# Patient Record
Sex: MALE | Race: WHITE | NOT HISPANIC OR LATINO | Employment: OTHER | URBAN - METROPOLITAN AREA
[De-identification: names, ages, dates, MRNs, and addresses within clinical notes are randomized per-mention and may not be internally consistent; named-entity substitution may affect disease eponyms.]

---

## 2019-12-27 PROBLEM — K76.0 FATTY LIVER: Status: ACTIVE | Noted: 2019-12-27

## 2019-12-27 PROBLEM — K44.9 ESOPHAGEAL HIATUS HERNIA: Status: ACTIVE | Noted: 2019-12-27

## 2019-12-27 PROBLEM — I70.0 AORTIC ATHEROSCLEROSIS: Status: ACTIVE | Noted: 2019-12-27

## 2020-07-27 PROBLEM — N40.1 BENIGN LOCALIZED PROSTATIC HYPERPLASIA WITH LOWER URINARY TRACT SYMPTOMS (LUTS): Status: ACTIVE | Noted: 2020-07-27

## 2021-02-08 ENCOUNTER — CLINICAL SUPPORT (OUTPATIENT)
Dept: URGENT CARE | Facility: CLINIC | Age: 76
End: 2021-02-08
Payer: MEDICARE

## 2021-02-08 DIAGNOSIS — Z03.818 ENCOUNTER FOR OBSERVATION FOR SUSPECTED EXPOSURE TO OTHER BIOLOGICAL AGENTS RULED OUT: Primary | ICD-10-CM

## 2021-02-08 LAB
CTP QC/QA: YES
SARS-COV-2 RDRP RESP QL NAA+PROBE: NEGATIVE

## 2021-02-08 PROCEDURE — 99211 OFF/OP EST MAY X REQ PHY/QHP: CPT | Mod: S$GLB,CS,, | Performed by: FAMILY MEDICINE

## 2021-02-08 PROCEDURE — U0002: ICD-10-PCS | Mod: QW,S$GLB,, | Performed by: FAMILY MEDICINE

## 2021-02-08 PROCEDURE — U0002 COVID-19 LAB TEST NON-CDC: HCPCS | Mod: QW,S$GLB,, | Performed by: FAMILY MEDICINE

## 2021-02-08 PROCEDURE — 99211 PR OFFICE/OUTPT VISIT, EST, LEVL I: ICD-10-PCS | Mod: S$GLB,CS,, | Performed by: FAMILY MEDICINE

## 2021-11-02 ENCOUNTER — IMMUNIZATION (OUTPATIENT)
Dept: PHARMACY | Facility: CLINIC | Age: 76
End: 2021-11-02
Payer: MEDICARE

## 2021-11-02 DIAGNOSIS — Z23 NEED FOR VACCINATION: Primary | ICD-10-CM

## 2021-11-29 PROBLEM — Z82.62 FAMILY HISTORY OF OSTEOPOROSIS: Status: ACTIVE | Noted: 2021-11-29

## 2022-01-24 ENCOUNTER — TELEPHONE (OUTPATIENT)
Dept: DERMATOLOGY | Facility: CLINIC | Age: 77
End: 2022-01-24
Payer: MEDICARE

## 2022-04-11 ENCOUNTER — OFFICE VISIT (OUTPATIENT)
Dept: DERMATOLOGY | Facility: CLINIC | Age: 77
End: 2022-04-11
Payer: MEDICARE

## 2022-04-11 VITALS — WEIGHT: 180.13 LBS | BODY MASS INDEX: 24.4 KG/M2 | HEIGHT: 72 IN | RESPIRATION RATE: 18 BRPM

## 2022-04-11 DIAGNOSIS — D48.5 NEOPLASM OF UNCERTAIN BEHAVIOR OF SKIN: ICD-10-CM

## 2022-04-11 DIAGNOSIS — L82.1 SEBORRHEIC KERATOSES: Primary | ICD-10-CM

## 2022-04-11 PROCEDURE — 3288F PR FALLS RISK ASSESSMENT DOCUMENTED: ICD-10-PCS | Mod: CPTII,S$GLB,, | Performed by: STUDENT IN AN ORGANIZED HEALTH CARE EDUCATION/TRAINING PROGRAM

## 2022-04-11 PROCEDURE — 1159F PR MEDICATION LIST DOCUMENTED IN MEDICAL RECORD: ICD-10-PCS | Mod: CPTII,S$GLB,, | Performed by: STUDENT IN AN ORGANIZED HEALTH CARE EDUCATION/TRAINING PROGRAM

## 2022-04-11 PROCEDURE — 99203 OFFICE O/P NEW LOW 30 MIN: CPT | Mod: 25,S$GLB,, | Performed by: STUDENT IN AN ORGANIZED HEALTH CARE EDUCATION/TRAINING PROGRAM

## 2022-04-11 PROCEDURE — 1126F AMNT PAIN NOTED NONE PRSNT: CPT | Mod: CPTII,S$GLB,, | Performed by: STUDENT IN AN ORGANIZED HEALTH CARE EDUCATION/TRAINING PROGRAM

## 2022-04-11 PROCEDURE — 1101F PR PT FALLS ASSESS DOC 0-1 FALLS W/OUT INJ PAST YR: ICD-10-PCS | Mod: CPTII,S$GLB,, | Performed by: STUDENT IN AN ORGANIZED HEALTH CARE EDUCATION/TRAINING PROGRAM

## 2022-04-11 PROCEDURE — 1160F PR REVIEW ALL MEDS BY PRESCRIBER/CLIN PHARMACIST DOCUMENTED: ICD-10-PCS | Mod: CPTII,S$GLB,, | Performed by: STUDENT IN AN ORGANIZED HEALTH CARE EDUCATION/TRAINING PROGRAM

## 2022-04-11 PROCEDURE — 1101F PT FALLS ASSESS-DOCD LE1/YR: CPT | Mod: CPTII,S$GLB,, | Performed by: STUDENT IN AN ORGANIZED HEALTH CARE EDUCATION/TRAINING PROGRAM

## 2022-04-11 PROCEDURE — 11102 TANGNTL BX SKIN SINGLE LES: CPT | Mod: S$GLB,,, | Performed by: STUDENT IN AN ORGANIZED HEALTH CARE EDUCATION/TRAINING PROGRAM

## 2022-04-11 PROCEDURE — 1159F MED LIST DOCD IN RCRD: CPT | Mod: CPTII,S$GLB,, | Performed by: STUDENT IN AN ORGANIZED HEALTH CARE EDUCATION/TRAINING PROGRAM

## 2022-04-11 PROCEDURE — 88305 TISSUE EXAM BY PATHOLOGIST: ICD-10-PCS | Mod: 26,,, | Performed by: PATHOLOGY

## 2022-04-11 PROCEDURE — 99203 PR OFFICE/OUTPT VISIT, NEW, LEVL III, 30-44 MIN: ICD-10-PCS | Mod: 25,S$GLB,, | Performed by: STUDENT IN AN ORGANIZED HEALTH CARE EDUCATION/TRAINING PROGRAM

## 2022-04-11 PROCEDURE — 88305 TISSUE EXAM BY PATHOLOGIST: CPT | Performed by: PATHOLOGY

## 2022-04-11 PROCEDURE — 1126F PR PAIN SEVERITY QUANTIFIED, NO PAIN PRESENT: ICD-10-PCS | Mod: CPTII,S$GLB,, | Performed by: STUDENT IN AN ORGANIZED HEALTH CARE EDUCATION/TRAINING PROGRAM

## 2022-04-11 PROCEDURE — 99999 PR PBB SHADOW E&M-EST. PATIENT-LVL III: CPT | Mod: PBBFAC,,, | Performed by: STUDENT IN AN ORGANIZED HEALTH CARE EDUCATION/TRAINING PROGRAM

## 2022-04-11 PROCEDURE — 11102 PR TANGENTIAL BIOPSY, SKIN, SINGLE LESION: ICD-10-PCS | Mod: S$GLB,,, | Performed by: STUDENT IN AN ORGANIZED HEALTH CARE EDUCATION/TRAINING PROGRAM

## 2022-04-11 PROCEDURE — 1160F RVW MEDS BY RX/DR IN RCRD: CPT | Mod: CPTII,S$GLB,, | Performed by: STUDENT IN AN ORGANIZED HEALTH CARE EDUCATION/TRAINING PROGRAM

## 2022-04-11 PROCEDURE — 3288F FALL RISK ASSESSMENT DOCD: CPT | Mod: CPTII,S$GLB,, | Performed by: STUDENT IN AN ORGANIZED HEALTH CARE EDUCATION/TRAINING PROGRAM

## 2022-04-11 PROCEDURE — 88305 TISSUE EXAM BY PATHOLOGIST: CPT | Mod: 26,,, | Performed by: PATHOLOGY

## 2022-04-11 PROCEDURE — 99999 PR PBB SHADOW E&M-EST. PATIENT-LVL III: ICD-10-PCS | Mod: PBBFAC,,, | Performed by: STUDENT IN AN ORGANIZED HEALTH CARE EDUCATION/TRAINING PROGRAM

## 2022-04-11 NOTE — PROGRESS NOTES
Subjective:       Patient ID:  Chacorta Gómez is a 76 y.o. male who presents for   Chief Complaint   Patient presents with    Lesion     Patient present for lesions.     Pt c/o lesion on left forehead X years. Previously treated with cryo. Pt states he wants it removed.    Pt c/o lesion on scalp X 1 year. Pt states he catches that spot when he landa his hair.    no Phx of NMSC.  no Fhx of melanoma.    Past Medical History:  No date: Benign localized hyperplasia of prostate with urinary   obstruction and other lower urinary tract symptoms (LUTS)(600.21)  7/12/2016: Essential hypertension  No date: Glaucoma      Comment:  bilat  No date: Spontaneous ecchymoses  No date: Unspecified essential hypertension        Review of Systems   HENT: Negative for congestion.    Respiratory: Negative for cough.    Skin: Positive for itching, activity-related sunscreen use and wears hat.   Hematologic/Lymphatic: Bruises/bleeds easily.        Objective:    Physical Exam   Constitutional: He appears well-developed and well-nourished. No distress.   Neurological: He is alert and oriented to person, place, and time. He is not disoriented.   Psychiatric: He has a normal mood and affect.   Skin:   Areas Examined (abnormalities noted in diagram):   Scalp / Hair Palpated and Inspected  Head / Face Inspection Performed                   Diagram Legend     Erythematous scaling macule/papule c/w actinic keratosis       Vascular papule c/w angioma      Pigmented verrucoid papule/plaque c/w seborrheic keratosis      Yellow umbilicated papule c/w sebaceous hyperplasia      Irregularly shaped tan macule c/w lentigo     1-2 mm smooth white papules consistent with Milia      Movable subcutaneous cyst with punctum c/w epidermal inclusion cyst      Subcutaneous movable cyst c/w pilar cyst      Firm pink to brown papule c/w dermatofibroma      Pedunculated fleshy papule(s) c/w skin tag(s)      Evenly pigmented macule c/w junctional nevus     Mildly  variegated pigmented, slightly irregular-bordered macule c/w mildly atypical nevus      Flesh colored to evenly pigmented papule c/w intradermal nevus       Pink pearly papule/plaque c/w basal cell carcinoma      Erythematous hyperkeratotic cursted plaque c/w SCC      Surgical scar with no sign of skin cancer recurrence      Open and closed comedones      Inflammatory papules and pustules      Verrucoid papule consistent consistent with wart     Erythematous eczematous patches and plaques     Dystrophic onycholytic nail with subungual debris c/w onychomycosis     Umbilicated papule    Erythematous-base heme-crusted tan verrucoid plaque consistent with inflamed seborrheic keratosis     Erythematous Silvery Scaling Plaque c/w Psoriasis     See annotation          Assessment / Plan:      Pathology Orders:     Normal Orders This Visit    Specimen to Pathology, Dermatology     Questions:    Procedure Type: Dermatology and skin neoplasms    Number of Specimens: 1    ------------------------: -------------------------    Spec 1 Procedure: Biopsy    Spec 1 Clinical Impression: r/o SCC vs ISK vs VV    Spec 1 Source: frontal scalp    Release to patient: Immediate        Seborrheic keratoses  These are benign inherited growths without a malignant potential. Reassurance given to patient. No treatment is necessary.     Neoplasm of uncertain behavior of skin  Shave biopsy procedure note:    Shave biopsy performed after verbal consent including risk of infection, scar, recurrence, need for additional treatment of site. Area prepped with alcohol, anesthetized with approximately 1.0cc of 1% lidocaine with epinephrine. Lesional tissue shaved with razor blade. Hemostasis achieved with application of aluminum chloride followed by hyfrecation. No complications. Dressing applied. Wound care explained.      -     Specimen to Pathology, Dermatology             No follow-ups on file.

## 2022-04-11 NOTE — PATIENT INSTRUCTIONS
CRYOSURGERY      Your doctor has used a method called cryosurgery to treat your skin condition. Cryosurgery refers to the use of very cold substances to treat a variety of skin conditions such as warts, pre-skin cancers, molluscum contagiosum, sun spots, and several benign growths. The substance we use in cryosurgery is liquid nitrogen and is so cold (-195 degrees Celsius) that is burns when administered.     Following treatment in the office, the skin may immediately burn and become red. You may find the area around the lesion is affected as well. It is sometimes necessary to treat not only the lesion, but a small area of the surrounding normal skin to achieve a good response.     A blister, and even a blood filled blister, may form after treatment.   This is a normal response. If the blister is painful, it is acceptable to sterilize a needle and with rubbing alcohol and gently pop the blister. It is important that you gently wash the area with soap and warm water as the blister fluid may contain wart virus if a wart was treated. Do no remove the roof of the blister.     The area treated can take anywhere from 1-3 weeks to heal. Healing time depends on the kind skin lesion treated, the location, and how aggressively the lesion was treated. It is recommended that the areas treated are covered with Vaseline or bacitracin ointment and a band-aid. If a band-aid is not practical, just ointment applied several times per day will do. Keeping these areas moist will speed the healing time.    Treatment with liquid nitrogen can leave a scar. In dark skin, it may be a light or dark scar, in light skin it may be a white or pink scar. These will generally fade with time, but may never go away completely.     If you have any concerns after your treatment, please feel free to call the office.       [address] [facility] [place] [address]/ [phone] FAX/ [email or web address]  Shave Biopsy Wound Care    Your  doctor has performed a shave biopsy today.  A band aid and vaseline ointment has been placed over the site.  This should remain in place for NO LONGER THAN 48 hours.  It is fine to remove the bandaid after 24 hours, if the area is no longer bleeding. It is recommended that you keep the area dry (do not wet)) for the first 24 hours.  After 24 hours, wash the area with warm soap and water and apply Vaseline jelly.  Many patients prefer to use Neosporin or Bacitracin ointment.  This is acceptable; however, know that you can develop an allergy to this medication even if you have used it safely for years.  It is important to keep the area moist.  Letting it dry out and get air slows healing time, and will worsen the scar.        If you notice increasing redness, tenderness, pain, or yellow drainage at the biopsy site, please notify your doctor.  These are signs of an infection.    If your biopsy site is bleeding, apply firm pressure for 15 minutes straight.  Repeat for another 15 minutes, if it is still bleeding.   If the surgical site continues to bleed, then please contact your doctor.      For [facility] users:   You will receive your biopsy results in [facility] as soon as they are available. Please be assured that your physician/provider will review your results and will then determine what further treatment, evaluation, or planning is required. You should be contacted by your physician's/provider's office within 5 business days of receiving your results; If not, please reach out to directly. This is one more way [facility] is putting you first.     [facility] [place] [address]/ [phone] FAX/ [email or web address]

## 2022-04-18 LAB
FINAL PATHOLOGIC DIAGNOSIS: NORMAL
GROSS: NORMAL
Lab: NORMAL
MICROSCOPIC EXAM: NORMAL

## 2022-12-09 PROBLEM — H40.9 GLAUCOMA: Status: ACTIVE | Noted: 2022-12-09

## 2022-12-09 PROBLEM — M47.816 OSTEOARTHRITIS OF LUMBAR SPINE: Status: ACTIVE | Noted: 2022-12-09

## 2023-02-15 ENCOUNTER — OFFICE VISIT (OUTPATIENT)
Dept: PAIN MEDICINE | Facility: CLINIC | Age: 78
End: 2023-02-15
Payer: MEDICARE

## 2023-02-15 VITALS
HEIGHT: 72 IN | WEIGHT: 181.69 LBS | SYSTOLIC BLOOD PRESSURE: 164 MMHG | HEART RATE: 50 BPM | DIASTOLIC BLOOD PRESSURE: 85 MMHG | BODY MASS INDEX: 24.61 KG/M2

## 2023-02-15 DIAGNOSIS — M54.50 CHRONIC MIDLINE LOW BACK PAIN WITHOUT SCIATICA: ICD-10-CM

## 2023-02-15 DIAGNOSIS — M54.6 ACUTE BILATERAL THORACIC BACK PAIN: ICD-10-CM

## 2023-02-15 DIAGNOSIS — G89.29 CHRONIC MIDLINE LOW BACK PAIN WITHOUT SCIATICA: ICD-10-CM

## 2023-02-15 PROCEDURE — 1159F MED LIST DOCD IN RCRD: CPT | Mod: CPTII,S$GLB,, | Performed by: PHYSICIAN ASSISTANT

## 2023-02-15 PROCEDURE — 1101F PT FALLS ASSESS-DOCD LE1/YR: CPT | Mod: CPTII,S$GLB,, | Performed by: PHYSICIAN ASSISTANT

## 2023-02-15 PROCEDURE — 1160F RVW MEDS BY RX/DR IN RCRD: CPT | Mod: CPTII,S$GLB,, | Performed by: PHYSICIAN ASSISTANT

## 2023-02-15 PROCEDURE — 3079F DIAST BP 80-89 MM HG: CPT | Mod: CPTII,S$GLB,, | Performed by: PHYSICIAN ASSISTANT

## 2023-02-15 PROCEDURE — 3077F SYST BP >= 140 MM HG: CPT | Mod: CPTII,S$GLB,, | Performed by: PHYSICIAN ASSISTANT

## 2023-02-15 PROCEDURE — 1101F PR PT FALLS ASSESS DOC 0-1 FALLS W/OUT INJ PAST YR: ICD-10-PCS | Mod: CPTII,S$GLB,, | Performed by: PHYSICIAN ASSISTANT

## 2023-02-15 PROCEDURE — 3288F FALL RISK ASSESSMENT DOCD: CPT | Mod: CPTII,S$GLB,, | Performed by: PHYSICIAN ASSISTANT

## 2023-02-15 PROCEDURE — 1159F PR MEDICATION LIST DOCUMENTED IN MEDICAL RECORD: ICD-10-PCS | Mod: CPTII,S$GLB,, | Performed by: PHYSICIAN ASSISTANT

## 2023-02-15 PROCEDURE — 99203 OFFICE O/P NEW LOW 30 MIN: CPT | Mod: S$GLB,,, | Performed by: PHYSICIAN ASSISTANT

## 2023-02-15 PROCEDURE — 99999 PR PBB SHADOW E&M-EST. PATIENT-LVL IV: CPT | Mod: PBBFAC,,, | Performed by: PHYSICIAN ASSISTANT

## 2023-02-15 PROCEDURE — 3079F PR MOST RECENT DIASTOLIC BLOOD PRESSURE 80-89 MM HG: ICD-10-PCS | Mod: CPTII,S$GLB,, | Performed by: PHYSICIAN ASSISTANT

## 2023-02-15 PROCEDURE — 3077F PR MOST RECENT SYSTOLIC BLOOD PRESSURE >= 140 MM HG: ICD-10-PCS | Mod: CPTII,S$GLB,, | Performed by: PHYSICIAN ASSISTANT

## 2023-02-15 PROCEDURE — 1126F PR PAIN SEVERITY QUANTIFIED, NO PAIN PRESENT: ICD-10-PCS | Mod: CPTII,S$GLB,, | Performed by: PHYSICIAN ASSISTANT

## 2023-02-15 PROCEDURE — 99999 PR PBB SHADOW E&M-EST. PATIENT-LVL IV: ICD-10-PCS | Mod: PBBFAC,,, | Performed by: PHYSICIAN ASSISTANT

## 2023-02-15 PROCEDURE — 1160F PR REVIEW ALL MEDS BY PRESCRIBER/CLIN PHARMACIST DOCUMENTED: ICD-10-PCS | Mod: CPTII,S$GLB,, | Performed by: PHYSICIAN ASSISTANT

## 2023-02-15 PROCEDURE — 3288F PR FALLS RISK ASSESSMENT DOCUMENTED: ICD-10-PCS | Mod: CPTII,S$GLB,, | Performed by: PHYSICIAN ASSISTANT

## 2023-02-15 PROCEDURE — 1126F AMNT PAIN NOTED NONE PRSNT: CPT | Mod: CPTII,S$GLB,, | Performed by: PHYSICIAN ASSISTANT

## 2023-02-15 PROCEDURE — 99203 PR OFFICE/OUTPT VISIT, NEW, LEVL III, 30-44 MIN: ICD-10-PCS | Mod: S$GLB,,, | Performed by: PHYSICIAN ASSISTANT

## 2023-02-15 NOTE — PROGRESS NOTES
[facility] New Patient Evaluation      Referred by: Genna Mcfadden    PCP:  Andrei Dupont MD    CC:   Chief Complaint   Patient presents with    Back Pain     Low back pain      No flowsheet data found.      HPI:   Chacorta Gómez is a 77 y.o. male with history of hypertension, glaucoma presents with back pain.  He is an avid walker, walking 2-3 miles daily. He has 20 years of managable lower lumbar back pain, but it is worsening.  More recently, he has developed mucsle tightness in the mid thoracic region over the last 1 year with walking.  It is preventing him from his usual walks and he has changed to bike riding, which does not seem to bother the mid back.  Denies any radicular lower extremity or chest wall radicular pain, numbness, tingling.  He takes aleve prn for pain.  No other treatments.       Past and current medications:  Antineuropathics:  NSAIDs:  aleve prn  Antidepressants:  Muscle relaxers:  Opioids:  Antiplatelets/Anticoagulants:    Physical therapy/ Chiropractic care:  none    Pain Intervention History:  none    Past Spine Surgical History:  none      History:    Current Outpatient Medications:     dorzolamide-timolol 2-0.5% (COSOPT) 22.3-6.8 mg/mL ophthalmic solution, Place 1 drop into both eyes 2 (two) times daily., Disp: , Rfl:     famotidine (PEPCID) 10 MG tablet, Take 10 mg by mouth nightly as needed for Heartburn. , Disp: , Rfl:     finasteride (PROSCAR) 5 mg tablet, TAKE 1 TABLET EVERY DAY, Disp: 90 tablet, Rfl: 3    hydroCHLOROthiazide (HYDRODIURIL) 12.5 MG Tab, TAKE 1 TABLET  BY MOUTH ONCE DAILY., Disp: 90 tablet, Rfl: 3    latanoprost 0.005 % ophthalmic solution, Place 1 drop into both eyes once daily., Disp: , Rfl:     lisinopriL (PRINIVIL,ZESTRIL) 30 MG tablet, TAKE 1 TABLET EVERY DAY, Disp: 90 tablet, Rfl: 3    loratadine (CLARITIN) 10 mg tablet, Take 1 tablet by mouth once daily., Disp: , Rfl:     saw palmetto 500 MG capsule, Take 500 mg by mouth once daily. Pt taking  450 mg 1 tablet daily, Disp: , Rfl:     Past Medical History:   Diagnosis Date    Benign localized hyperplasia of prostate with urinary obstruction and other lower urinary tract symptoms (LUTS)(600.21)     Essential hypertension 7/12/2016    Glaucoma     bilat    Spontaneous ecchymoses     Unspecified essential hypertension        Past Surgical History:   Procedure Laterality Date    COLONOSCOPY  08/03/2009    Dr. Askew    NASAL SEPTUM SURGERY      TONSILLECTOMY         Family History   Problem Relation Age of Onset    Heart attack Mother     Hypertension Mother     Cancer Father         prostate     Hypertension Brother     Stroke Brother         mild        Social History     Socioeconomic History    Marital status:      Spouse name: Uyen    Number of children: 0   Tobacco Use    Smoking status: Never    Smokeless tobacco: Never   Substance and Sexual Activity    Alcohol use: Yes     Alcohol/week: 14.0 standard drinks     Types: 7 Glasses of wine, 7 Cans of beer per week    Drug use: Never    Sexual activity: Yes     Partners: Male       Review of patient's allergies indicates:  No Known Allergies    Review of Systems:  Mid back pain.  Low back pain.  Balance of review of systems is negative.    Physical Exam:  Vitals:    02/15/23 1001   BP: (!) 164/85   Pulse: (!) 50   Weight: 82.4 kg (181 lb 10.5 oz)   Height: 6' (1.829 m)   PainSc: 0-No pain     Body mass index is 24.64 kg/m².    Gen: NAD  Psych: mood appropriate for given condition  HEENT: eyes anicteric   CV: RRR, 2+ radial pulse  HEENT: anicteric   Respiratory: non-labored, no signs of respiratory distress  Abd: non-distended  Skin: warm, dry and intact.  Gait: Able to heel walk, toe walk. No antalgic gait.     Coordination:   Romberg: negative  Finger to nose coordination: normal  Heel to shin coordination: normal  Tandem walking coordination: normal    Cervical spine:   ROM is full in flexion, extension and lateral rotation without increased  pain.  Spurling's maneuver causes no neck pain to either side.  Myofascial exam: No Tenderness to palpation across cervical paraspinous region bilaterally.    Lumbar spine:   ROM is full with flexion extension and oblique extension with no increased pain.    Jaime's test causes no increased pain on either side.    Supine straight leg raise is negative bilaterally.    Internal and external rotation of the hip causes no increased pain on either side.  Myofascial exam: No tenderness to palpation across lumbar paraspinous muscles. No tenderness to palpation over the bilateral greater trochanters and bilateral SI joint    Sensory:  Intact and symmetrical to light touch in C4-T1 dermatomes bilaterally. Intact and symmetrical to light touch in L1-S1 dermatomes bilaterally.    Motor:    Right Left   C4 Shoulder Abduction  5  5   C5 Elbow Flexion    5  5   C6 Wrist Extension  5  5   C7 Elbow Extension   5  5   C8/T1 Hand Intrinsics   5  5        Right Left   L2/3 Iliacus Hip flexion  5  5   L3/4 Qudratus Femoris Knee Extension  5  5   L4/5 Tib Anterior Ankle Dorsiflexion   5  5   L5/S1 Extensor Hallicus Longus Great toe extension  5  5   S1/S2 Gastroc/Soleus Plantar Flexion  5  5      Right Left   Triceps DTR 2+ 2+   Biceps DTR 2+ 2+   Brachioradialis DTR 2+ 2+   Patellar DTR 2+ 2+   Achilles DTR 2+ 2+   Petty Absent  Absent   Clonus Absent Absent   Babinski Absent Absent     Imaging:    Xray lumbar spine from 11-29-21 reviewed.  Grade 1 retrolisthesis of L5 on S1 with modic endplate changes.    Labs:  No results found for: LABA1C, HGBA1C    Lab Results   Component Value Date    WBC 5.61 06/03/2022    HGB 15.6 06/03/2022    HCT 45.7 06/03/2022    MCV 89 06/03/2022     06/03/2022           Assessment:     Mr. Whatley has chronic lumbar region back pain with new intermittent thoracic back pain bilaterally.  No radiculoapthy nor neurological deficits.  Pain pattern in the mid thoracic region most consistent with  myofascial, mechanical back pain.  We discussed medications, PT ,home exercise.  At this time, he elects to proceed with PT. ([facility] in [place]).  Follow up as needed.       Problem List Items Addressed This Visit    None  Visit Diagnoses       Chronic midline low back pain without sciatica        Relevant Orders    Ambulatory referral/consult to Physical/Occupational Therapy    Acute bilateral thoracic back pain        Relevant Orders    Ambulatory referral/consult to Physical/Occupational Therapy              Follow Up: RTC as needed if no improvement with PT.    : Not applicable          Selena Vogel PA-C  [facility]      This note was completed with dictation software and grammatical errors may exist.

## 2023-02-24 ENCOUNTER — PATIENT MESSAGE (OUTPATIENT)
Dept: PAIN MEDICINE | Facility: CLINIC | Age: 78
End: 2023-02-24
Payer: MEDICARE

## 2023-03-24 ENCOUNTER — TELEPHONE (OUTPATIENT)
Dept: ENDOCRINOLOGY | Facility: CLINIC | Age: 78
End: 2023-03-24
Payer: MEDICARE

## 2023-03-24 NOTE — TELEPHONE ENCOUNTER
----- Message from Joan Grissom sent at 3/23/2023  4:31 PM CDT -----  Type:  Sooner Appointment Request    Caller is requesting a sooner appointment.  Caller declined first available appointment listed below.  Caller will not accept being placed on the waitlist and is requesting a message be sent to doctor.    Name of Caller:  pt   When is the first available appointment?  Unk   Symptoms:  referral   Best Call Back Number:  [phone]    Additional Information:  pt is requesting a appt after may 13 or late may or early June .  No Tuesday appt . Please advise thank you

## 2023-04-14 ENCOUNTER — OFFICE VISIT (OUTPATIENT)
Dept: ENDOCRINOLOGY | Facility: CLINIC | Age: 78
End: 2023-04-14
Payer: MEDICARE

## 2023-04-14 VITALS
SYSTOLIC BLOOD PRESSURE: 110 MMHG | WEIGHT: 177.38 LBS | HEIGHT: 72 IN | DIASTOLIC BLOOD PRESSURE: 70 MMHG | BODY MASS INDEX: 24.03 KG/M2 | HEART RATE: 51 BPM | OXYGEN SATURATION: 96 %

## 2023-04-14 DIAGNOSIS — I16.1 HYPERTENSIVE EMERGENCY: ICD-10-CM

## 2023-04-14 DIAGNOSIS — R29.90 NEW ONSET SEIZURE WITH ABNORMAL NEUROLOGICAL EXAM WITHOUT HEAD TRAUMA: ICD-10-CM

## 2023-04-14 DIAGNOSIS — E04.9 ENLARGED THYROID GLAND: ICD-10-CM

## 2023-04-14 DIAGNOSIS — R79.89 LOW TSH LEVEL: ICD-10-CM

## 2023-04-14 DIAGNOSIS — R56.9 NEW ONSET SEIZURE WITH ABNORMAL NEUROLOGICAL EXAM WITHOUT HEAD TRAUMA: ICD-10-CM

## 2023-04-14 DIAGNOSIS — E04.2 MULTIPLE THYROID NODULES: ICD-10-CM

## 2023-04-14 PROCEDURE — 1101F PR PT FALLS ASSESS DOC 0-1 FALLS W/OUT INJ PAST YR: ICD-10-PCS | Mod: CPTII,S$GLB,, | Performed by: INTERNAL MEDICINE

## 2023-04-14 PROCEDURE — 3078F PR MOST RECENT DIASTOLIC BLOOD PRESSURE < 80 MM HG: ICD-10-PCS | Mod: CPTII,S$GLB,, | Performed by: INTERNAL MEDICINE

## 2023-04-14 PROCEDURE — 3074F PR MOST RECENT SYSTOLIC BLOOD PRESSURE < 130 MM HG: ICD-10-PCS | Mod: CPTII,S$GLB,, | Performed by: INTERNAL MEDICINE

## 2023-04-14 PROCEDURE — 1126F AMNT PAIN NOTED NONE PRSNT: CPT | Mod: CPTII,S$GLB,, | Performed by: INTERNAL MEDICINE

## 2023-04-14 PROCEDURE — 3288F PR FALLS RISK ASSESSMENT DOCUMENTED: ICD-10-PCS | Mod: CPTII,S$GLB,, | Performed by: INTERNAL MEDICINE

## 2023-04-14 PROCEDURE — 99999 PR PBB SHADOW E&M-EST. PATIENT-LVL IV: ICD-10-PCS | Mod: PBBFAC,,, | Performed by: INTERNAL MEDICINE

## 2023-04-14 PROCEDURE — 3074F SYST BP LT 130 MM HG: CPT | Mod: CPTII,S$GLB,, | Performed by: INTERNAL MEDICINE

## 2023-04-14 PROCEDURE — 1126F PR PAIN SEVERITY QUANTIFIED, NO PAIN PRESENT: ICD-10-PCS | Mod: CPTII,S$GLB,, | Performed by: INTERNAL MEDICINE

## 2023-04-14 PROCEDURE — 99999 PR PBB SHADOW E&M-EST. PATIENT-LVL IV: CPT | Mod: PBBFAC,,, | Performed by: INTERNAL MEDICINE

## 2023-04-14 PROCEDURE — 3288F FALL RISK ASSESSMENT DOCD: CPT | Mod: CPTII,S$GLB,, | Performed by: INTERNAL MEDICINE

## 2023-04-14 PROCEDURE — 3078F DIAST BP <80 MM HG: CPT | Mod: CPTII,S$GLB,, | Performed by: INTERNAL MEDICINE

## 2023-04-14 PROCEDURE — 99203 PR OFFICE/OUTPT VISIT, NEW, LEVL III, 30-44 MIN: ICD-10-PCS | Mod: S$GLB,,, | Performed by: INTERNAL MEDICINE

## 2023-04-14 PROCEDURE — 1101F PT FALLS ASSESS-DOCD LE1/YR: CPT | Mod: CPTII,S$GLB,, | Performed by: INTERNAL MEDICINE

## 2023-04-14 PROCEDURE — 1160F PR REVIEW ALL MEDS BY PRESCRIBER/CLIN PHARMACIST DOCUMENTED: ICD-10-PCS | Mod: CPTII,S$GLB,, | Performed by: INTERNAL MEDICINE

## 2023-04-14 PROCEDURE — 1159F MED LIST DOCD IN RCRD: CPT | Mod: CPTII,S$GLB,, | Performed by: INTERNAL MEDICINE

## 2023-04-14 PROCEDURE — 1160F RVW MEDS BY RX/DR IN RCRD: CPT | Mod: CPTII,S$GLB,, | Performed by: INTERNAL MEDICINE

## 2023-04-14 PROCEDURE — 1159F PR MEDICATION LIST DOCUMENTED IN MEDICAL RECORD: ICD-10-PCS | Mod: CPTII,S$GLB,, | Performed by: INTERNAL MEDICINE

## 2023-04-14 PROCEDURE — 99203 OFFICE O/P NEW LOW 30 MIN: CPT | Mod: S$GLB,,, | Performed by: INTERNAL MEDICINE

## 2023-04-14 NOTE — PROGRESS NOTES
CHIEF COMPLAINT:  Multinodular goiter  77 y.o. old being seen as a new patient. Recently had an Ultrasound showing Multinodular Goiter.  Recently had a CVA and went to [place]. No XRT to head/neck. No difficulty swallowing. No change in voice. No palpitations. No tremors. No increased anxiety.       PAST MEDICAL HISTORY/PAST SURGICAL HISTORY:  Reviewed in [facility]    SOCIAL HISTORY: Reviewed in [facility]    FAMILY HISTORY:  no known thyroid disease. No DM.     MEDICATIONS/ALLERGIES: The patient's MedCard has been updated and reviewed.            PE:    GENERAL: Well developed, well nourished.  NECK: Supple, trachea midline, isthmus nodule palpable  CHEST: Resp even and unlabored, CTA bilateral.  CARDIAC: RRR, S1, S2 heard, no murmurs, rubs, S3, or S4    LABS/Radiology     Latest Reference Range & Units 04/10/23 10:38   TSH 0.400 - 4.000 uIU/mL 0.598   T3, Free 2.77 - 5.27 pg/mL 4.00   Free T4 0.78 - 2.19 ng/dL 0.87       US SOFT TISSUE HEAD NECK THYROID     CLINICAL HISTORY:  Other specified abnormal findings of blood chemistry. Low TSH.     TECHNIQUE:  Ultrasound of the thyroid was performed.     COMPARISON:  None.     FINDINGS:  The right lobe of the thyroid gland measures approximately 4.6 x 1.7 x 1.7 cm. The thyroid isthmus measures approximately 7 mm. The left lobe of the thyroid gland measures approximately 4.6 x 1.6 x 1.7 cm. There is a heterogeneous mixed solid and cystic appearing nodule measuring 1.8 x 0.7 x 1.4 cm within the thyroid isthmus (TI-RADS 3).  There is a hypoechoic wider than tall nodule measuring 1.0 x 0.6 x 0.9 cm in the mid right thyroid lobe (TI-RADS 4).  There is a 0.9 x 0.4 x 0.8 cm mixed solid and cystic appearing nodule in the lower pole right thyroid lobe (TI-RADS 3).  There appears to be a mixed solid and cystic wider than tall hypoechoic nodule measuring 9 x 5 x 7 mm in the midpole left thyroid lobe (TI-RADS 3).  There is a mixed solid and cystic hypoechoic wider than tall nodule measuring  5 x 3 x 5 mm in the lower pole left thyroid lobe (TI-RADS 3).  The background thyroid echotexture and vascularity appear to be within normal limits.     Impression:     1. The thyroid gland appears mildly enlarged.  2. Multiple thyroid nodules are seen.  Recommend follow-up with ultrasound in 1 year according to the TI-RADS criteria.    ASSESSMENT/PLAN:  1.  Multinodular goiter- no XRT.  No obstructive symptoms.  TSH currently within normal limits.  He did have a suppressed TSH before, but now normal.  No symptoms of hyperthyroidism.  Has multiple small nodules.  Independently reviewed ultrasound images nodules do not meet criteria for FNA.  Would recommend repeating ultrasound in 1 year.      FOLLOWUP  F/U 1 year TSH, thyroid Ultrasound.

## 2023-04-17 PROBLEM — G40.909 SEIZURE DISORDER: Status: ACTIVE | Noted: 2023-04-17

## 2023-04-20 ENCOUNTER — EXTERNAL HOME HEALTH (OUTPATIENT)
Dept: HOME HEALTH SERVICES | Facility: HOSPITAL | Age: 78
End: 2023-04-20
Payer: MEDICARE

## 2023-06-01 ENCOUNTER — TELEPHONE (OUTPATIENT)
Dept: NEUROLOGY | Facility: CLINIC | Age: 78
End: 2023-06-01
Payer: MEDICARE

## 2023-06-01 NOTE — TELEPHONE ENCOUNTER
Spoke to the pt, appt scheduled on 7/11/23 at 1400 with Renée Abarca. Date, time and location discussed.

## 2023-06-05 PROBLEM — G93.9 BRAIN LESION: Status: ACTIVE | Noted: 2023-06-05

## 2023-06-05 PROBLEM — F10.10 ALCOHOL ABUSE: Status: ACTIVE | Noted: 2023-06-05

## 2023-06-05 PROBLEM — R55 SYNCOPE: Status: ACTIVE | Noted: 2023-06-05

## 2023-06-05 PROBLEM — D69.6 THROMBOCYTOPENIA: Status: ACTIVE | Noted: 2023-06-05

## 2023-06-05 PROBLEM — R60.9 EDEMA: Status: ACTIVE | Noted: 2023-06-05

## 2023-06-06 DIAGNOSIS — G93.9 BRAIN LESION: Primary | ICD-10-CM

## 2023-06-06 DIAGNOSIS — R79.1 ABNORMAL COAGULATION PROFILE: ICD-10-CM

## 2023-06-06 PROBLEM — R09.81 CONGESTION OF NASAL SINUS: Status: ACTIVE | Noted: 2023-06-06

## 2023-06-06 RX ORDER — SODIUM CHLORIDE, SODIUM LACTATE, POTASSIUM CHLORIDE, CALCIUM CHLORIDE 600; 310; 30; 20 MG/100ML; MG/100ML; MG/100ML; MG/100ML
INJECTION, SOLUTION INTRAVENOUS CONTINUOUS
Status: CANCELLED | OUTPATIENT
Start: 2023-06-06

## 2023-06-06 RX ORDER — LIDOCAINE HYDROCHLORIDE 10 MG/ML
1 INJECTION, SOLUTION EPIDURAL; INFILTRATION; INTRACAUDAL; PERINEURAL ONCE
Status: CANCELLED | OUTPATIENT
Start: 2023-06-06 | End: 2023-06-06

## 2023-06-07 PROBLEM — S01.312A LACERATION OF LEFT EAR: Status: ACTIVE | Noted: 2023-06-07

## 2023-06-14 PROBLEM — R00.1 BRADYCARDIA: Status: ACTIVE | Noted: 2023-06-14

## 2023-06-14 PROBLEM — E87.1 HYPONATREMIA: Status: ACTIVE | Noted: 2023-06-14

## 2023-06-14 PROBLEM — G93.6 VASOGENIC BRAIN EDEMA: Status: ACTIVE | Noted: 2023-06-05

## 2023-06-15 PROBLEM — R73.9 HYPERGLYCEMIA: Status: ACTIVE | Noted: 2023-06-15

## 2023-06-27 ENCOUNTER — CLINICAL SUPPORT (OUTPATIENT)
Dept: NEUROSURGERY | Facility: CLINIC | Age: 78
End: 2023-06-27
Payer: MEDICARE

## 2023-06-27 NOTE — PROGRESS NOTES
Pt is 14 days s/p craniotomy with Dr. English. No s/s of infection. Incision cleaned with chloraprep and staples removed with no issue. Incision is warm, dry, intact. Pt reports post-operative pain level less than pre-operative state. Pt is not requesting medication refill today. Pt re-educated on narcotics policy. Educated patient on weight lifting status, bending/lifting/twisting, and to call with any changes or questions. Pt aware of imaging and f/u appt with provider. No further questions.

## 2023-07-06 ENCOUNTER — TELEPHONE (OUTPATIENT)
Dept: NEUROSURGERY | Facility: CLINIC | Age: 78
End: 2023-07-06
Payer: MEDICARE

## 2023-07-06 DIAGNOSIS — C71.9 GLIOBLASTOMA MULTIFORME OF BRAIN: Primary | ICD-10-CM

## 2023-07-06 NOTE — PROGRESS NOTES
07/07/2023    [facility]   Radiation Oncology Consultation    ASSESSMENT  This is a 77 y.o. y/o male with multifocal right frontal GBM, s/p resection 6/13/23.       PLAN    Treatment options were discussed with the patient including temozolomide alone, concurrent temozolomide, vs hospice enrollment  We discussed the goals of treatment to be likely palliative given his relatively poor performance status at present.  The risks, benefits, scheduling, alternatives to and rationale of radiation therapy were explained in detail.  We discussed potential toxicities of whole brain radiation therapy, including but not limited to fatigue which could be persistent, local skin reaction, alopecia, change or loss of sense of taste or smell, impaired cognition, lowered seizure threshold, damage to the brain or cranial nerves.   After this discussion, he elected to proceed with consultation with , consideration of treatment vs hospice  Referral to Behavioral Health for psychosocial stress management.    Advised no driving for minimum of 6 months.   He was given our contact information, and he was told that he could call our clinic at any time if he has any questions or concerns.      Radiation Treatment Details:   Pending discussion with Neuro-onc, we will consider to treat right partial brain to a dose of 40.05 Gy in 15 fractions at 2.67 Gy per fraction delivered daily  We will utilize a(n) IMRT technique.    IMRT is medically necessary to treat complex dose painted target volumes in the brain region with concave and convex isodose lines with steep isodose gradients to spare multiple adjacent organs at risk including the brain, brainstem, optic structures   We will utilize daily CT or orthogonal image guidance due to the need for accurate daily patient alignment to treat the target volumes accurately and avoid radiation overdose to multiple regional organs at risk since we are treating the patient  with complex target volumes with multiple steep isodose gradients.         Chief Complaint   Patient presents with    Brain Tumor       Oncology History   Glioblastoma determined by biopsy of brain   7/7/2023 Initial Diagnosis    Glioblastoma determined by biopsy of brain     7/7/2023 Cancer Staged    Staging form: Brain and Spinal Cord, AJCC 8th Edition  - Pathologic stage from 7/7/2023: WHO Grade IV         HPI: The patient is a 77 year old male with history of HTN, glaucoma, who presented with seizures. Brain mass was initially diagnosed several months ago at [place] during work up for seizure vs stroke showing a mass, but he subsequently went on a trip to [facility]. Started on Depakote and steroids. Had recurrent seizure in [place].     He was BIBF to ED for progressive neuro symptoms including confusion/AMS (got lost driving, slept in car in the woods, found by police search).     6/5/23 MRI Brain: 4.2 enhancing mass medial right frontal; second inferior right frontal lobe lesion, subtle nonspecific asymmetric cortical thickening and T2 FLAIR in left inferior frontal and insular cortex.    6/13/23 Right frontal resection: WHO Grade IV GBM. IDH 1/2 wt, +MGMT methylation.    Doing well since surgery; notes bilateral foot swelling since prior to surgery (possibly 2/2 steroids). Unsteady on feet, generalized weakness, significant daytime sleepiness (possibly 2/2 Depakote or Vimpat)    Possibility of pregnancy: No  History of prior irradiation: No  History of prior systemic anti-cancer therapy: No  History of collagen vascular disease: No  Implanted electronic device (pacer/defib/nerve stimulator): No    Past Medical History:   Diagnosis Date    Benign localized hyperplasia of prostate with urinary obstruction and other lower urinary tract symptoms (LUTS)(600.21)     Brain lesion     CVA (cerebral vascular accident)     [facility]    Essential hypertension 07/12/2016    Glaucoma     bilat    Multinodular goiter      Seizures     Spontaneous ecchymoses     Unspecified essential hypertension        Past Surgical History:   Procedure Laterality Date    COLONOSCOPY  08/03/2009    Dr. Askew    CRANIOTOMY FOR EXCISION OF INTRACRANIAL TUMOR      CRANIOTOMY, WITH NEOPLASM EXCISION USING COMPUTER-ASSISTED NAVIGATION Right 6/13/2023    Procedure: CRANIOTOMY, WITH NEOPLASM EXCISION USING COMPUTER-ASSISTED NAVIGATION -     RIGHT FRONTAL;  Surgeon: Joao English MD;  Location: [facility] OR;  Service: Neurosurgery;  Laterality: Right;    NASAL SEPTUM SURGERY      TONSILLECTOMY         Social History     Tobacco Use    Smoking status: Never     Passive exposure: Never    Smokeless tobacco: Never   Substance Use Topics    Alcohol use: Not Currently     Alcohol/week: 14.0 standard drinks     Types: 7 Glasses of wine, 7 Cans of beer per week    Drug use: Never       Cancer-related family history includes Cancer in his father.    Current Outpatient Medications on File Prior to Visit   Medication Sig Dispense Refill    atorvastatin (LIPITOR) 40 MG tablet Take 1 tablet (40 mg total) by mouth every evening. 90 tablet 3    chlorpheniramine (CHLOR-TRIMETON) 4 mg tablet Take 4 mg by mouth daily as needed (sneezing attacks).      divalproex (DEPAKOTE) 500 MG TbEC Take 2 tablets (1,000 mg total) by mouth every 12 (twelve) hours. 120 tablet 2    dorzolamide-timolol 2-0.5% (COSOPT) 22.3-6.8 mg/mL ophthalmic solution Place 1 drop into both eyes 2 (two) times daily.      esomeprazole (NEXIUM) 40 MG capsule Take 1 capsule (40 mg total) by mouth before breakfast. 90 capsule 1    famotidine (PEPCID) 20 MG tablet Take 20 mg by mouth daily as needed for Heartburn.      finasteride (PROSCAR) 5 mg tablet TAKE 1 TABLET EVERY DAY (Patient taking differently: Take 5 mg by mouth once daily.) 90 tablet 3    folic acid (FOLVITE) 1 MG tablet Take 1 tablet (1 mg total) by mouth once daily. 90 tablet 3    hydroCHLOROthiazide (HYDRODIURIL) 12.5 MG Tab TAKE 1 TABLET  BY  MOUTH ONCE DAILY. (Patient taking differently: Take 12.5 mg by mouth once daily.) 90 tablet 3    lacosamide (VIMPAT) 100 mg Tab Take 1 tablet (100 mg total) by mouth every 12 (twelve) hours. For additional seizure prevention 60 tablet 0    latanoprost 0.005 % ophthalmic solution Place 1 drop into both eyes every evening.      lisinopriL 10 MG tablet Take 1 tablet (10 mg total) by mouth once daily. 90 tablet 3    loratadine (CLARITIN) 10 mg tablet Take 10 mg by mouth once daily.      multivitamin Tab Take 1 tablet by mouth once daily. 30 tablet 0    mupirocin (BACTROBAN) 2 % ointment Apply topically 2 (two) times daily. To areas of abrasion/irritation on face/around lips-nose/left ear 15 g 0    oxyCODONE-acetaminophen (PERCOCET) 7.5-325 mg per tablet Take 1 tablet by mouth every 6 (six) hours as needed for Pain. 28 tablet 0    thiamine 100 MG tablet Take 100 mg by mouth once daily.       No current facility-administered medications on file prior to visit.       Review of patient's allergies indicates:  No Known Allergies    Review of Systems   Constitutional:  Positive for malaise/fatigue (significant).   Eyes:  Positive for blurred vision. Negative for double vision.   Gastrointestinal:  Negative for nausea and vomiting.   Neurological:  Positive for seizures and weakness. Negative for dizziness, sensory change, speech change, focal weakness and headaches.      Vital Signs: /69   Pulse (!) 48   Temp 98.1 °F (36.7 °C)   Resp 18   Wt 75.5 kg (166 lb 7.2 oz)   SpO2 97%   BMI 22.57 kg/m²     ECOG Performance Status: 3 - Confined to bed or chair 50% of waking hours    Physical Exam  Constitutional:       General: He is not in acute distress.     Appearance: He is normal weight. He is ill-appearing. He is not toxic-appearing or diaphoretic.   Neurological:      Mental Status: He is lethargic.      Cranial Nerves: No cranial nerve deficit.      Motor: Weakness present. No abnormal muscle tone or seizure  activity.      Gait: Gait abnormal (unstable).          Imaging: I have personally reviewed the patient's available images and reports and summarized pertinent findings above in HPI.     Pathology: I have personally reviewed the patient's available pathology and summarized pertinent findings above in HPI.     This case was discussed with Dr. English.      - Thank you for allowing me to participate in the care of your patient.    Alexa Lisa MD     62 minutes spent in chart/case review, face-to-face interaction, discussion with other providers, and treatment planning/coordination of care.

## 2023-07-06 NOTE — TELEPHONE ENCOUNTER
Called Pt to set up NP appointment with Dr. Macias from referral.     Appt accepted 7/11 at 10 am. Directions to clinic and direct phone number given. No further questions/concerns at this time.

## 2023-07-07 ENCOUNTER — OFFICE VISIT (OUTPATIENT)
Dept: RADIATION ONCOLOGY | Facility: CLINIC | Age: 78
End: 2023-07-07
Payer: MEDICARE

## 2023-07-07 VITALS
WEIGHT: 166.44 LBS | HEART RATE: 48 BPM | DIASTOLIC BLOOD PRESSURE: 69 MMHG | BODY MASS INDEX: 22.57 KG/M2 | OXYGEN SATURATION: 97 % | TEMPERATURE: 98 F | SYSTOLIC BLOOD PRESSURE: 126 MMHG | RESPIRATION RATE: 18 BRPM

## 2023-07-07 DIAGNOSIS — C71.9 GLIOBLASTOMA DETERMINED BY BIOPSY OF BRAIN: ICD-10-CM

## 2023-07-07 DIAGNOSIS — C71.9 GLIOBLASTOMA MULTIFORME OF BRAIN: ICD-10-CM

## 2023-07-07 PROCEDURE — 3288F FALL RISK ASSESSMENT DOCD: CPT | Mod: CPTII,S$GLB,, | Performed by: RADIOLOGY

## 2023-07-07 PROCEDURE — 1101F PR PT FALLS ASSESS DOC 0-1 FALLS W/OUT INJ PAST YR: ICD-10-PCS | Mod: CPTII,S$GLB,, | Performed by: RADIOLOGY

## 2023-07-07 PROCEDURE — 3074F SYST BP LT 130 MM HG: CPT | Mod: CPTII,S$GLB,, | Performed by: RADIOLOGY

## 2023-07-07 PROCEDURE — 99205 OFFICE O/P NEW HI 60 MIN: CPT | Mod: S$GLB,,, | Performed by: RADIOLOGY

## 2023-07-07 PROCEDURE — 3074F PR MOST RECENT SYSTOLIC BLOOD PRESSURE < 130 MM HG: ICD-10-PCS | Mod: CPTII,S$GLB,, | Performed by: RADIOLOGY

## 2023-07-07 PROCEDURE — 99999 PR PBB SHADOW E&M-EST. PATIENT-LVL V: ICD-10-PCS | Mod: PBBFAC,,, | Performed by: RADIOLOGY

## 2023-07-07 PROCEDURE — 99999 PR PBB SHADOW E&M-EST. PATIENT-LVL V: CPT | Mod: PBBFAC,,, | Performed by: RADIOLOGY

## 2023-07-07 PROCEDURE — 3078F PR MOST RECENT DIASTOLIC BLOOD PRESSURE < 80 MM HG: ICD-10-PCS | Mod: CPTII,S$GLB,, | Performed by: RADIOLOGY

## 2023-07-07 PROCEDURE — 1126F AMNT PAIN NOTED NONE PRSNT: CPT | Mod: CPTII,S$GLB,, | Performed by: RADIOLOGY

## 2023-07-07 PROCEDURE — 1159F MED LIST DOCD IN RCRD: CPT | Mod: CPTII,S$GLB,, | Performed by: RADIOLOGY

## 2023-07-07 PROCEDURE — 1126F PR PAIN SEVERITY QUANTIFIED, NO PAIN PRESENT: ICD-10-PCS | Mod: CPTII,S$GLB,, | Performed by: RADIOLOGY

## 2023-07-07 PROCEDURE — 1159F PR MEDICATION LIST DOCUMENTED IN MEDICAL RECORD: ICD-10-PCS | Mod: CPTII,S$GLB,, | Performed by: RADIOLOGY

## 2023-07-07 PROCEDURE — 1111F DSCHRG MED/CURRENT MED MERGE: CPT | Mod: CPTII,S$GLB,, | Performed by: RADIOLOGY

## 2023-07-07 PROCEDURE — 3078F DIAST BP <80 MM HG: CPT | Mod: CPTII,S$GLB,, | Performed by: RADIOLOGY

## 2023-07-07 PROCEDURE — 1101F PT FALLS ASSESS-DOCD LE1/YR: CPT | Mod: CPTII,S$GLB,, | Performed by: RADIOLOGY

## 2023-07-07 PROCEDURE — 1111F PR DISCHARGE MEDS RECONCILED W/ CURRENT OUTPATIENT MED LIST: ICD-10-PCS | Mod: CPTII,S$GLB,, | Performed by: RADIOLOGY

## 2023-07-07 PROCEDURE — 3288F PR FALLS RISK ASSESSMENT DOCUMENTED: ICD-10-PCS | Mod: CPTII,S$GLB,, | Performed by: RADIOLOGY

## 2023-07-07 PROCEDURE — 99205 PR OFFICE/OUTPT VISIT, NEW, LEVL V, 60-74 MIN: ICD-10-PCS | Mod: S$GLB,,, | Performed by: RADIOLOGY

## 2023-07-11 ENCOUNTER — TELEPHONE (OUTPATIENT)
Dept: PHARMACY | Facility: CLINIC | Age: 78
End: 2023-07-11
Payer: MEDICARE

## 2023-07-11 ENCOUNTER — OFFICE VISIT (OUTPATIENT)
Dept: NEUROSURGERY | Facility: CLINIC | Age: 78
End: 2023-07-11
Payer: MEDICARE

## 2023-07-11 VITALS
HEIGHT: 72 IN | HEART RATE: 61 BPM | SYSTOLIC BLOOD PRESSURE: 113 MMHG | DIASTOLIC BLOOD PRESSURE: 71 MMHG | BODY MASS INDEX: 22.69 KG/M2 | WEIGHT: 167.56 LBS

## 2023-07-11 DIAGNOSIS — R56.9 SEIZURE: ICD-10-CM

## 2023-07-11 DIAGNOSIS — C71.9 GLIOBLASTOMA MULTIFORME OF BRAIN: Primary | ICD-10-CM

## 2023-07-11 PROCEDURE — 3078F PR MOST RECENT DIASTOLIC BLOOD PRESSURE < 80 MM HG: ICD-10-PCS | Mod: CPTII,S$GLB,, | Performed by: PSYCHIATRY & NEUROLOGY

## 2023-07-11 PROCEDURE — 1126F AMNT PAIN NOTED NONE PRSNT: CPT | Mod: CPTII,S$GLB,, | Performed by: PSYCHIATRY & NEUROLOGY

## 2023-07-11 PROCEDURE — 99215 OFFICE O/P EST HI 40 MIN: CPT | Mod: S$GLB,,, | Performed by: PSYCHIATRY & NEUROLOGY

## 2023-07-11 PROCEDURE — 1126F PR PAIN SEVERITY QUANTIFIED, NO PAIN PRESENT: ICD-10-PCS | Mod: CPTII,S$GLB,, | Performed by: PSYCHIATRY & NEUROLOGY

## 2023-07-11 PROCEDURE — 1100F PR PT FALLS ASSESS DOC 2+ FALLS/FALL W/INJURY/YR: ICD-10-PCS | Mod: CPTII,S$GLB,, | Performed by: PSYCHIATRY & NEUROLOGY

## 2023-07-11 PROCEDURE — 3078F DIAST BP <80 MM HG: CPT | Mod: CPTII,S$GLB,, | Performed by: PSYCHIATRY & NEUROLOGY

## 2023-07-11 PROCEDURE — 1100F PTFALLS ASSESS-DOCD GE2>/YR: CPT | Mod: CPTII,S$GLB,, | Performed by: PSYCHIATRY & NEUROLOGY

## 2023-07-11 PROCEDURE — 3074F PR MOST RECENT SYSTOLIC BLOOD PRESSURE < 130 MM HG: ICD-10-PCS | Mod: CPTII,S$GLB,, | Performed by: PSYCHIATRY & NEUROLOGY

## 2023-07-11 PROCEDURE — 99999 PR PBB SHADOW E&M-EST. PATIENT-LVL IV: ICD-10-PCS | Mod: PBBFAC,,, | Performed by: PSYCHIATRY & NEUROLOGY

## 2023-07-11 PROCEDURE — 3074F SYST BP LT 130 MM HG: CPT | Mod: CPTII,S$GLB,, | Performed by: PSYCHIATRY & NEUROLOGY

## 2023-07-11 PROCEDURE — 1111F DSCHRG MED/CURRENT MED MERGE: CPT | Mod: CPTII,S$GLB,, | Performed by: PSYCHIATRY & NEUROLOGY

## 2023-07-11 PROCEDURE — 3288F FALL RISK ASSESSMENT DOCD: CPT | Mod: CPTII,S$GLB,, | Performed by: PSYCHIATRY & NEUROLOGY

## 2023-07-11 PROCEDURE — 1159F PR MEDICATION LIST DOCUMENTED IN MEDICAL RECORD: ICD-10-PCS | Mod: CPTII,S$GLB,, | Performed by: PSYCHIATRY & NEUROLOGY

## 2023-07-11 PROCEDURE — 99999 PR PBB SHADOW E&M-EST. PATIENT-LVL IV: CPT | Mod: PBBFAC,,, | Performed by: PSYCHIATRY & NEUROLOGY

## 2023-07-11 PROCEDURE — 99215 PR OFFICE/OUTPT VISIT, EST, LEVL V, 40-54 MIN: ICD-10-PCS | Mod: S$GLB,,, | Performed by: PSYCHIATRY & NEUROLOGY

## 2023-07-11 PROCEDURE — 1159F MED LIST DOCD IN RCRD: CPT | Mod: CPTII,S$GLB,, | Performed by: PSYCHIATRY & NEUROLOGY

## 2023-07-11 PROCEDURE — 3288F PR FALLS RISK ASSESSMENT DOCUMENTED: ICD-10-PCS | Mod: CPTII,S$GLB,, | Performed by: PSYCHIATRY & NEUROLOGY

## 2023-07-11 PROCEDURE — 1111F PR DISCHARGE MEDS RECONCILED W/ CURRENT OUTPATIENT MED LIST: ICD-10-PCS | Mod: CPTII,S$GLB,, | Performed by: PSYCHIATRY & NEUROLOGY

## 2023-07-11 RX ORDER — TEMOZOLOMIDE 5 MG/1
5 CAPSULE ORAL DAILY
Qty: 21 CAPSULE | Refills: 0 | Status: ACTIVE | OUTPATIENT
Start: 2023-07-11 | End: 2023-08-16

## 2023-07-11 RX ORDER — TEMOZOLOMIDE 140 MG/1
140 CAPSULE ORAL DAILY
Qty: 21 CAPSULE | Refills: 0 | Status: ACTIVE | OUTPATIENT
Start: 2023-07-11 | End: 2023-08-16

## 2023-07-11 NOTE — TELEPHONE ENCOUNTER
Hello, this is Mckinley Valles, clinical pharmacist with [facility] that is part of your care team.  We have begun working on your prescription that your doctor has sent us. Our next steps include:     Working with your insurance company to obtain approval for your medication  Working with you to ensure your medication is affordable     We will be calling you along the way with updates on your medication but if you have any concerns or receive information that you would like to discuss please reach us at [phone].    Welcome call outcome: No answer/Unable to leave voicemail

## 2023-07-11 NOTE — PROGRESS NOTES
Oncology Clinic   Initial Consult Note    Patient: Chacorta Gómez  MRN: [id number]  Date: 7/11/2023    Chief Complaint: Glioblastoma treatment         Oncologic History:   2/2023: admitted to outside hospital for new onset seizure, he was started on keppra. MRI brain was done and was reportedly suspicious for subacute infarct.   4-5/2023: was in [place] for a work trip, had recurrent seizures, was hospitalized there for 10 days.   6/2023: hospitalized for breakthrough seizures, lacosamide was added, underwent repeat MRI brain which showed 4.2 x 2.6 x 4.1 cm heterogeneous enhancing hemorrhagic medial right frontal lesion. A 2nd lesion along the planum sphenoidale has a dural base but appears to also involve the inferior frontal lobe parenchyma. Patient was seen by neurosurgery and underwent subtotal resection. Pathology consistent with GBM.     Subjective:     Interval History: Mr. Gómez is a 77 y.o. male who is being seen for an initial consult. He's been recovering well from surgery. He is complaining of decreased focus and slowing down. He complained of fatigue. Doesn't need any assistance with ADLs. Able to walk one block with help of cane. Complained of unstable gait sometimes. Denies any headaches, no recurrent seizures on lacosamide/keppra. Here with his wife. Remains on dexamethasone per PCP.      Past Medical History:   Past Medical History:   Diagnosis Date    Benign localized hyperplasia of prostate with urinary obstruction and other lower urinary tract symptoms (LUTS)(600.21)     Brain lesion     CVA (cerebral vascular accident)     [facility]    Essential hypertension 07/12/2016    Glaucoma     bilat    Multinodular goiter     Seizures     Spontaneous ecchymoses     Unspecified essential hypertension        Past Surgical HIstory:   Past Surgical History:   Procedure Laterality Date    COLONOSCOPY  08/03/2009    Dr. Askew    CRANIOTOMY FOR EXCISION OF INTRACRANIAL TUMOR      CRANIOTOMY, WITH  NEOPLASM EXCISION USING COMPUTER-ASSISTED NAVIGATION Right 6/13/2023    Procedure: CRANIOTOMY, WITH NEOPLASM EXCISION USING COMPUTER-ASSISTED NAVIGATION -     RIGHT FRONTAL;  Surgeon: Joao English MD;  Location: [facility] OR;  Service: Neurosurgery;  Laterality: Right;    NASAL SEPTUM SURGERY      TONSILLECTOMY         Family History:   Family History   Problem Relation Age of Onset    Heart attack Mother     Hypertension Mother     Cancer Father         prostate     Hypertension Brother     Stroke Brother         mild        Social History:  reports that he has never smoked. He has never been exposed to tobacco smoke. He has never used smokeless tobacco. He reports that he does not currently use alcohol after a past usage of about 14.0 standard drinks per week. He reports that he does not use drugs.    Medications:  Current Outpatient Medications   Medication Sig Dispense Refill    atorvastatin (LIPITOR) 40 MG tablet Take 1 tablet (40 mg total) by mouth every evening. 90 tablet 3    chlorpheniramine (CHLOR-TRIMETON) 4 mg tablet Take 4 mg by mouth daily as needed (sneezing attacks).      divalproex (DEPAKOTE) 500 MG TbEC Take 2 tablets (1,000 mg total) by mouth every 12 (twelve) hours. 120 tablet 2    dorzolamide-timolol 2-0.5% (COSOPT) 22.3-6.8 mg/mL ophthalmic solution Place 1 drop into both eyes 2 (two) times daily.      esomeprazole (NEXIUM) 40 MG capsule Take 1 capsule (40 mg total) by mouth before breakfast. 90 capsule 1    famotidine (PEPCID) 20 MG tablet Take 20 mg by mouth daily as needed for Heartburn.      finasteride (PROSCAR) 5 mg tablet TAKE 1 TABLET EVERY DAY (Patient taking differently: Take 5 mg by mouth once daily.) 90 tablet 3    folic acid (FOLVITE) 1 MG tablet Take 1 tablet (1 mg total) by mouth once daily. 90 tablet 3    hydroCHLOROthiazide (HYDRODIURIL) 12.5 MG Tab TAKE 1 TABLET  BY MOUTH ONCE DAILY. (Patient taking differently: Take 12.5 mg by mouth once daily.) 90 tablet 3    lacosamide  (VIMPAT) 100 mg Tab Take 1 tablet (100 mg total) by mouth every 12 (twelve) hours. For additional seizure prevention 60 tablet 0    latanoprost 0.005 % ophthalmic solution Place 1 drop into both eyes every evening.      lisinopriL 10 MG tablet Take 1 tablet (10 mg total) by mouth once daily. 90 tablet 3    loratadine (CLARITIN) 10 mg tablet Take 10 mg by mouth once daily.      multivitamin Tab Take 1 tablet by mouth once daily. 30 tablet 0    mupirocin (BACTROBAN) 2 % ointment Apply topically 2 (two) times daily. To areas of abrasion/irritation on face/around lips-nose/left ear 15 g 0    oxyCODONE-acetaminophen (PERCOCET) 7.5-325 mg per tablet Take 1 tablet by mouth every 6 (six) hours as needed for Pain. 28 tablet 0    thiamine 100 MG tablet Take 100 mg by mouth once daily.       No current facility-administered medications for this visit.       Review of Systems   Constitutional:  Positive for fatigue. Negative for chills and fever.   HENT:  Negative for rhinorrhea and sore throat.    Respiratory:  Negative for cough, shortness of breath and wheezing.    Cardiovascular:  Positive for leg swelling. Negative for chest pain and palpitations.   Gastrointestinal:  Negative for diarrhea, nausea and vomiting.   Genitourinary:  Negative for dysuria and flank pain.   Musculoskeletal:  Negative for back pain.   Skin:  Negative for rash and wound.   Neurological:  Positive for weakness. Negative for numbness.   Psychiatric/Behavioral:  Positive for decreased concentration. Negative for agitation, behavioral problems and confusion.      Karnofsky Score:     Objective:     Vitals:    07/11/23 1056   BP: 113/71   BP Location: Left arm   Patient Position: Sitting   BP Method: Medium (Automatic)   Pulse: 61   Weight: 76 kg (167 lb 8.8 oz)   Height: 6' (1.829 m)       BMI: Body mass index is 22.72 kg/m².     Physical Exam  Constitutional:       General: He is not in acute distress.     Appearance: Normal appearance. He is not  toxic-appearing.   HENT:      Head: Normocephalic and atraumatic.      Nose: Nose normal.      Mouth/Throat:      Mouth: Mucous membranes are moist.      Pharynx: Oropharynx is clear.   Eyes:      General: No scleral icterus.     Conjunctiva/sclera: Conjunctivae normal.   Cardiovascular:      Rate and Rhythm: Normal rate.   Pulmonary:      Effort: Pulmonary effort is normal. No respiratory distress.      Breath sounds: No wheezing.   Abdominal:      General: There is no distension.      Palpations: Abdomen is soft.      Tenderness: There is no abdominal tenderness.   Musculoskeletal:         General: No tenderness.      Cervical back: Normal range of motion.      Right lower leg: Edema present.      Left lower leg: Edema present.   Skin:     General: Skin is warm and dry.      Findings: No rash.   Neurological:      Mental Status: He is alert and oriented to person, place, and time.      Motor: Weakness (RLE) present.   Psychiatric:         Mood and Affect: Mood normal.         Behavior: Behavior normal.         Thought Content: Thought content normal.       Laboratory Data:  No visits with results within 1 Week(s) from this visit.   Latest known visit with results is:   Admission on 06/13/2023, Discharged on 06/15/2023   Component Date Value    aPTT 06/13/2023 20.6 (L)     WBC 06/13/2023 8.64     RBC 06/13/2023 4.42 (L)     Hemoglobin 06/13/2023 13.8 (L)     Hematocrit 06/13/2023 39.9 (L)     MCV 06/13/2023 90     MCH 06/13/2023 31.2 (H)     MCHC 06/13/2023 34.6     RDW 06/13/2023 15.2 (H)     Platelets 06/13/2023 181     MPV 06/13/2023 9.3     Immature Granulocytes 06/13/2023 3.9 (H)     Gran # (ANC) 06/13/2023 5.1     Immature Grans (Abs) 06/13/2023 0.34 (H)     Lymph # 06/13/2023 1.8     Mono # 06/13/2023 1.0     Eos # 06/13/2023 0.2     Baso # 06/13/2023 0.06     nRBC 06/13/2023 0     Gran % 06/13/2023 59.5     Lymph % 06/13/2023 21.3     Mono % 06/13/2023 11.9     Eosinophil % 06/13/2023 2.7     Basophil %  06/13/2023 0.7     Platelet Estimate 06/13/2023 Appears normal     Differential Method 06/13/2023 Automated     Sodium 06/13/2023 134 (L)     Potassium 06/13/2023 4.8     Chloride 06/13/2023 100     CO2 06/13/2023 30     Glucose 06/13/2023 90     BUN 06/13/2023 26 (H)     Creatinine 06/13/2023 0.61     Calcium 06/13/2023 8.6     Total Protein 06/13/2023 6.6     Albumin 06/13/2023 3.6     Total Bilirubin 06/13/2023 0.9     Alkaline Phosphatase 06/13/2023 <20 (L)     AST 06/13/2023 43     ALT 06/13/2023 28     Anion Gap 06/13/2023 4     eGFR 06/13/2023 >60     PT 06/13/2023 13.6     INR 06/13/2023 1.0     Group & Rh 06/13/2023 A POS     Indirect Josie GEL 06/13/2023 NEG     Specimen Outdate 06/13/2023 06/16/2023 23:59     POCT Glucose 06/13/2023 90     Sodium 06/13/2023 133 (L)     POCT Glucose 06/13/2023 128 (H)     Specimen UA 06/13/2023 Urine, Clean Catch     Color, UA 06/13/2023 Yellow     Appearance, UA 06/13/2023 Clear     pH, UA 06/13/2023 8.5 (A)     Specific [place], UA 06/13/2023 1.010     Protein, UA 06/13/2023 Negative     Glucose, UA 06/13/2023 Trace (A)     Ketones, UA 06/13/2023 Negative     Bilirubin (UA) 06/13/2023 Negative     Occult Blood UA 06/13/2023 Negative     Nitrite, UA 06/13/2023 Negative     Urobilinogen, UA 06/13/2023 0.2     Leukocytes, UA 06/13/2023 Negative     Sodium, Urine 06/13/2023 189     Osmolality, Urine 06/13/2023 472     WBC 06/14/2023 12.24     RBC 06/14/2023 4.36 (L)     Hemoglobin 06/14/2023 13.8 (L)     Hematocrit 06/14/2023 38.9 (L)     MCV 06/14/2023 89     MCH 06/14/2023 31.7 (H)     MCHC 06/14/2023 35.5     RDW 06/14/2023 14.7 (H)     Platelets 06/14/2023 158     MPV 06/14/2023 9.3     Immature Granulocytes 06/14/2023 1.3 (H)     Gran # (ANC) 06/14/2023 8.8 (H)     Immature Grans (Abs) 06/14/2023 0.16 (H)     Lymph # 06/14/2023 1.5     Mono # 06/14/2023 1.7 (H)     Eos # 06/14/2023 0.0     Baso # 06/14/2023 0.04     nRBC 06/14/2023 0     Gran % 06/14/2023 71.8      Lymph % 06/14/2023 12.5 (L)     Mono % 06/14/2023 14.1     Eosinophil % 06/14/2023 0.0     Basophil % 06/14/2023 0.3     Differential Method 06/14/2023 Automated     Sodium 06/14/2023 131 (L)     Potassium 06/14/2023 3.5     Chloride 06/14/2023 98     CO2 06/14/2023 29     Glucose 06/14/2023 105     BUN 06/14/2023 12     Creatinine 06/14/2023 0.45 (L)     Calcium 06/14/2023 8.2 (L)     Anion Gap 06/14/2023 4     eGFR 06/14/2023 >60     Magnesium 06/14/2023 2.1     Phosphorus 06/14/2023 3.8     Osmolality 06/14/2023 288     WBC 06/15/2023 15.45 (H)     RBC 06/15/2023 4.59 (L)     Hemoglobin 06/15/2023 14.5     Hematocrit 06/15/2023 40.4     MCV 06/15/2023 88     MCH 06/15/2023 31.6 (H)     MCHC 06/15/2023 35.9     RDW 06/15/2023 15.3 (H)     Platelets 06/15/2023 150     MPV 06/15/2023 10.1     Immature Granulocytes 06/15/2023 2.1 (H)     Gran # (ANC) 06/15/2023 12.2 (H)     Immature Grans (Abs) 06/15/2023 0.32 (H)     Lymph # 06/15/2023 1.5     Mono # 06/15/2023 1.4 (H)     Eos # 06/15/2023 0.0     Baso # 06/15/2023 0.06     nRBC 06/15/2023 0     Gran % 06/15/2023 78.9 (H)     Lymph % 06/15/2023 9.7 (L)     Mono % 06/15/2023 8.9     Eosinophil % 06/15/2023 0.0     Basophil % 06/15/2023 0.4     Differential Method 06/15/2023 Automated     Sodium 06/15/2023 132 (L)     Potassium 06/15/2023 4.9     Chloride 06/15/2023 99     CO2 06/15/2023 25     Glucose 06/15/2023 220 (H)     BUN 06/15/2023 17     Creatinine 06/15/2023 0.54     Calcium 06/15/2023 8.8     Anion Gap 06/15/2023 8     eGFR 06/15/2023 >60     POC BE 06/13/2023 6 (H)     POC Glucose 06/13/2023 127 (H)     POC HEMOGLOBIN 06/13/2023 10.9 (L)     POC HCO3 06/13/2023 29.7 (H)     POC Hematocrit 06/13/2023 32.0 (L)     POC Ionized Calcium 06/13/2023 1.17     POC Potassium 06/13/2023 3.4 (L)     POC Sodium 06/13/2023 141     POC PCO2 06/13/2023 40.7     POC PH 06/13/2023 7.471 (H)     POC PO2 06/13/2023 489 (H)     POC SATURATED O2 06/13/2023 100     POC TCO2  06/13/2023 31 (H)     POC pCO2 Temp 06/13/2023 39.2     POC pH Temp 06/13/2023 7.484 (H)     POC pO2 Temp 06/13/2023 483 (H)     FiO2 06/13/2023 97.0      Assessment and Plan:     1. Glioblastoma multiforme of brain    2. Seizure      GBM WHO grade 4, MGMT methylated, multifocal disease s/p subtotal resection. KPS 70-80  Discussed with aptient regarding diagnosis and treatment options. Focus of treatment is for palliative reasons, to imrpove quality of life nad prolong life. Patient is interested in pursuing treatment. We discussed use of concurrent hypofractionated radiation and chemotherapy with TMZ. Discussed side effects of TMZ including but not limited to nausea/vomiting/constipation/bone marrow suppression/alopecia/liver dysfunction.   Plan to proceed with concurrent CRT for 3 weeks with TMZ brand 75 mg/m2 followed by 4 weeks break and then adjuvant TMZ for 6 cycles   Patient will be followed in [facility] due to his preference  Patient is scheduled to see Dr. 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 Gilberto Perera MD PGY-6  [facility]

## 2023-07-12 ENCOUNTER — TELEPHONE (OUTPATIENT)
Dept: NEUROSURGERY | Facility: CLINIC | Age: 78
End: 2023-07-12

## 2023-07-12 NOTE — TELEPHONE ENCOUNTER
Patient was scheduled for his 4 week PO with CT today. He could not make it to CT because of car trouble. His wife came by to see if he could still be seen. Let her know we would really need to have the CT to make sure everything looks ok. She stated understanding. Tried calling to Let her know I rescheduled CT scan to check and make sure everything looks ok. Dr. English said they do not have to come back in for PO since it would have to be scheduled so far out. That they can call after treatment has been started with Dr. Macias and Dr. Lisa. Dr. Lisa would most likely order a new MRI once that is done and when that is done he needs to follow up with Dr. English. Usually the MRI is done 2 months after treatment. When patient or wife calls back they need to know this

## 2023-07-14 ENCOUNTER — TELEPHONE (OUTPATIENT)
Dept: NEUROSURGERY | Facility: CLINIC | Age: 78
End: 2023-07-14
Payer: MEDICARE

## 2023-07-14 NOTE — TELEPHONE ENCOUNTER
----- Message from Sarina Lacy sent at 7/14/2023  2:28 PM CDT -----  Regarding: return call  Contact: Uyen john  Type:  Patient Returning Call    Who Called:  Uyen spouse  Who Left Message for Patient:  Renate  Does the patient know what this is regarding?:  Cat Scan  Best Call Back Number:  [phone]  Additional Information:  Please call patient spouse to advise.  Thanks!

## 2023-07-14 NOTE — TELEPHONE ENCOUNTER
----- Message from Vasu Martinez sent at 7/14/2023  3:00 PM CDT -----  Contact: Self  Type: Needs Medical Advice  Who Called:  Spouse/Uyen    Best Call Back Number: [phone]   Additional Information:  States pt had a Bx at dentist for poss. oral abscess. States Dentist told her treatment would interfere with cancer treatment.   States performed by dentist Dr Gadiel Keyes [phone]    Please advise.          Patient is a resident at the [facility], spoke with her representative Sammy (son) regarding UTI, needing treatment. He asked I call his wife sharan to confirm pharmacy. Keflex BID dosing sent to pharmacy based on calculated CrClof 25.     Of note apparently patient lost her phone after hospital visit but it was found in the ambulance so sharan was transferred to security by her request

## 2023-07-19 ENCOUNTER — HOSPITAL ENCOUNTER (OUTPATIENT)
Dept: RADIOLOGY | Facility: HOSPITAL | Age: 78
Discharge: HOME | End: 2023-07-19
Attending: NEUROLOGICAL SURGERY
Payer: MEDICARE

## 2023-07-19 ENCOUNTER — TELEPHONE (OUTPATIENT)
Dept: NEUROSURGERY | Facility: CLINIC | Age: 78
End: 2023-07-19
Payer: MEDICARE

## 2023-07-19 ENCOUNTER — TELEPHONE (OUTPATIENT)
Dept: RADIATION ONCOLOGY | Facility: CLINIC | Age: 78
End: 2023-07-19
Payer: MEDICARE

## 2023-07-19 DIAGNOSIS — G93.9 BRAIN LESION: ICD-10-CM

## 2023-07-19 PROCEDURE — 70450 CT HEAD/BRAIN W/O DYE: CPT | Mod: 26,,, | Performed by: RADIOLOGY

## 2023-07-19 PROCEDURE — 70450 CT HEAD WITHOUT CONTRAST: ICD-10-PCS | Mod: 26,,, | Performed by: RADIOLOGY

## 2023-07-19 PROCEDURE — 70450 CT HEAD/BRAIN W/O DYE: CPT | Mod: TC,PO

## 2023-07-19 NOTE — TELEPHONE ENCOUNTER
"Spouse called back to state that they are refusing to go to the ED, pt has CT scheduled today @ 1645, spouse states per paramedics "emergent is when he's not able to move or talk" also states the ED is "pretty pricey" so she is taking him to the CT appt and will wait until his f/u in clinic on 8/9. Reported falls and symptoms to Dr. English also sent him msg.  "

## 2023-07-19 NOTE — TELEPHONE ENCOUNTER
"Reports 3 falls in past week. First on the sidewalk using the walker; Second going up the stairs, needed paramedics to get him up; and most recent was yesterday in bathroom. Wife states she found him on floor with his head between the cabinet and toilet, pt got up alert and oriented. Reports altered mental status today and redness of the ear. I recommended she take him to the ED. Spouse states she "will let him sleep until 1pm because when he's well rested he can ambulate independently."   "

## 2023-07-20 ENCOUNTER — TELEPHONE (OUTPATIENT)
Dept: RADIATION ONCOLOGY | Facility: CLINIC | Age: 78
End: 2023-07-20
Payer: MEDICARE

## 2023-07-20 ENCOUNTER — TELEPHONE (OUTPATIENT)
Dept: NEUROSURGERY | Facility: CLINIC | Age: 78
End: 2023-07-20
Payer: MEDICARE

## 2023-07-20 DIAGNOSIS — C71.9 GLIOBLASTOMA MULTIFORME OF BRAIN: ICD-10-CM

## 2023-07-20 DIAGNOSIS — C71.9 GLIOBLASTOMA DETERMINED BY BIOPSY OF BRAIN: Primary | ICD-10-CM

## 2023-07-20 DIAGNOSIS — C71.9 GLIOBLASTOMA MULTIFORME OF BRAIN: Primary | ICD-10-CM

## 2023-07-20 NOTE — TELEPHONE ENCOUNTER
Called and spoke with Mr. Gómez's spouse, I encouraged her to follow-up with Neuro-Oncology as well as Radiation Oncology as soon as possible to continue post resection care for the patient's glioblastoma.

## 2023-07-20 NOTE — TELEPHONE ENCOUNTER
----- Message from Yolande Chacko sent at 7/20/2023 11:25 AM CDT -----  Regarding: Pt advcie  Contact: Pt wife  Pt wife is requesting a callback from provider regarding pt. She stated  pt sees Dr. English and she would like to go over some information that her and Dr. English discussed. Please adv         Confirmed contact below:   Contact Name:Uyen Gómez  Phone Number: [phone]

## 2023-07-21 DIAGNOSIS — C71.9 GLIOBLASTOMA MULTIFORME OF BRAIN: Primary | ICD-10-CM

## 2023-07-21 RX ORDER — ONDANSETRON HYDROCHLORIDE 8 MG/1
TABLET, FILM COATED ORAL
Qty: 30 TABLET | Refills: 3 | Status: SHIPPED | OUTPATIENT
Start: 2023-07-21 | End: 2023-12-28 | Stop reason: SDUPTHER

## 2023-07-24 ENCOUNTER — DOCUMENTATION ONLY (OUTPATIENT)
Dept: HEMATOLOGY/ONCOLOGY | Facility: CLINIC | Age: 78
End: 2023-07-24
Payer: MEDICARE

## 2023-07-24 ENCOUNTER — HOSPITAL ENCOUNTER (OUTPATIENT)
Dept: RADIOLOGY | Facility: HOSPITAL | Age: 78
Discharge: HOME | End: 2023-07-24
Attending: RADIOLOGY
Payer: MEDICARE

## 2023-07-24 DIAGNOSIS — C71.9 GLIOBLASTOMA DETERMINED BY BIOPSY OF BRAIN: ICD-10-CM

## 2023-07-24 DIAGNOSIS — C71.9 GLIOBLASTOMA MULTIFORME OF BRAIN: ICD-10-CM

## 2023-07-24 PROCEDURE — 70553 MRI BRAIN STEM W/O & W/DYE: CPT | Mod: 26,,, | Performed by: RADIOLOGY

## 2023-07-24 PROCEDURE — 70553 MRI BRAIN STEM W/O & W/DYE: CPT | Mod: TC,PO

## 2023-07-24 PROCEDURE — A9585 GADOBUTROL INJECTION: HCPCS | Mod: PO | Performed by: RADIOLOGY

## 2023-07-24 PROCEDURE — 25500020 PHARM REV CODE 255: Mod: PO | Performed by: RADIOLOGY

## 2023-07-24 PROCEDURE — 70553 MRI BRAIN W WO CONTRAST: ICD-10-PCS | Mod: 26,,, | Performed by: RADIOLOGY

## 2023-07-24 RX ORDER — GADOBUTROL 604.72 MG/ML
7.5 INJECTION INTRAVENOUS
Status: COMPLETED | OUTPATIENT
Start: 2023-07-24 | End: 2023-07-24

## 2023-07-24 RX ADMIN — GADOBUTROL 7 ML: 604.72 INJECTION INTRAVENOUS at 12:07

## 2023-07-24 NOTE — PROGRESS NOTES
Received request to assist the patient with the copay for his medication. He cannot afford his Temodar. Request is for $63.19. Cost transfer sent to pharmacy using [employer] funds.

## 2023-07-25 ENCOUNTER — TELEPHONE (OUTPATIENT)
Dept: RADIATION ONCOLOGY | Facility: CLINIC | Age: 78
End: 2023-07-25
Payer: MEDICARE

## 2023-07-25 ENCOUNTER — SPECIALTY PHARMACY (OUTPATIENT)
Dept: PHARMACY | Facility: CLINIC | Age: 78
End: 2023-07-25
Payer: MEDICARE

## 2023-07-25 ENCOUNTER — PATIENT MESSAGE (OUTPATIENT)
Dept: PHARMACY | Facility: CLINIC | Age: 78
End: 2023-07-25
Payer: MEDICARE

## 2023-07-26 ENCOUNTER — SPECIALTY PHARMACY (OUTPATIENT)
Dept: PHARMACY | Facility: CLINIC | Age: 78
End: 2023-07-26
Payer: MEDICARE

## 2023-07-26 ENCOUNTER — TELEPHONE (OUTPATIENT)
Dept: RADIATION ONCOLOGY | Facility: CLINIC | Age: 78
End: 2023-07-26
Payer: MEDICARE

## 2023-07-26 ENCOUNTER — PATIENT MESSAGE (OUTPATIENT)
Dept: PHARMACY | Facility: CLINIC | Age: 78
End: 2023-07-26
Payer: MEDICARE

## 2023-07-26 DIAGNOSIS — C71.9 GLIOBLASTOMA MULTIFORME OF BRAIN: Primary | ICD-10-CM

## 2023-07-26 NOTE — TELEPHONE ENCOUNTER
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 male, who is followed by the specialty pharmacy service for management and education.    Recent Encounters       Date Type Provider Description    07/26/2023 Specialty Pharmacy Mckinley Valles PharmD Initial Clinical Assessment    07/25/2023 Specialty Pharmacy Mckinley Valles PharmD Referral Authorization            Current Outpatient Medications   Medication Sig    atorvastatin (LIPITOR) 40 MG tablet Take 1 tablet (40 mg total) by mouth every evening.    chlorpheniramine (CHLOR-TRIMETON) 4 mg tablet Take 4 mg by mouth daily as needed (sneezing attacks).    divalproex (DEPAKOTE) 500 MG TbEC Take 2 tablets (1,000 mg total) by mouth every 12 (twelve) hours.    dorzolamide-timolol 2-0.5% (COSOPT) 22.3-6.8 mg/mL ophthalmic solution Place 1 drop into both eyes 2 (two) times daily.    esomeprazole (NEXIUM) 40 MG capsule Take 1 capsule (40 mg total) by mouth before breakfast.    famotidine (PEPCID) 20 MG tablet Take 20 mg by mouth daily as needed for Heartburn.    finasteride (PROSCAR) 5 mg tablet TAKE 1 TABLET EVERY DAY (Patient taking differently: Take 5 mg by mouth once daily.)    folic acid (FOLVITE) 1 MG tablet Take 1 tablet (1 mg total) by mouth once daily.    hydroCHLOROthiazide (HYDRODIURIL) 12.5 MG Tab TAKE 1 TABLET  BY MOUTH ONCE DAILY. (Patient taking differently: Take 12.5 mg by mouth once daily.)    lacosamide (VIMPAT) 100 mg Tab Take 1 tablet (100 mg total) by mouth every 12 (twelve) hours. For additional seizure prevention    latanoprost 0.005 % ophthalmic solution Place 1 drop into both  eyes every evening.    lisinopriL 10 MG tablet Take 1 tablet (10 mg total) by mouth once daily.    loratadine (CLARITIN) 10 mg tablet Take 10 mg by mouth once daily.    multivitamin Tab Take 1 tablet by mouth once daily.    mupirocin (BACTROBAN) 2 % ointment Apply topically 2 (two) times daily. To areas of abrasion/irritation on face/around lips-nose/left ear    ondansetron (ZOFRAN) 8 MG tablet Take 8mg (1 tab) 30-45 minutes prior to chemotherapy dose and every 8 hours as needed    oxyCODONE-acetaminophen (PERCOCET) 7.5-325 mg per tablet Take 1 tablet by mouth every 6 (six) hours as needed for Pain.    temozolomide (TEMODAR) 140 MG capsule Take 1 capsule (140 mg total) by mouth once daily Take as directed days 1-21 (3 weeks). Take on an empty stomach. with 1 other temozolomide prescription for 145 mg total.    temozolomide (TEMODAR) 5 MG capsule Take 1 capsule (5 mg total) by mouth once daily Take as directed days 1-21 (3 weeks). Take on an empty stomach. with 1 other temozolomide prescription for 145 mg total.    thiamine 100 MG tablet Take 100 mg by mouth once daily.   Last reviewed on 7/26/2023 11:48 AM by Mckinley Valles, Katty    Review of patient's allergies indicates:  No Known AllergiesLast reviewed on  7/26/2023 11:48 AM by Mckinley Valles    Drug Interactions    Drug interactions evaluated: yes  Clinically relevant drug interactions identified: yes   Interactions list: Divalproex/Temodar (C) - can increase toxicity of Temodar (monitor)     Drug management plan: Divalproex/Temodar (C) - can increase toxicity of Temodar (monitor)   Provided the patient with educational material regarding drug interactions: not applicable         Adverse Effects    Fatigue: Pos  Leg swelling: Pos  Weakness: Pos  Decreased concentration: Pos  *All other systems reviewed and are negative       Assessment Questions - Documented Responses      Flowsheet Row Most Recent Value   Assessment    Medication Reconciliation completed for  patient No  [Will need to obtain at next refill. Will open MTP]   During the past 4 weeks, has patient missed any activities due to condition or medication? No   During the past 4 weeks, did patient have any of the following urgent care visits? None   Goals of Therapy Status Discussed (new start)   Status of the patients ability to self-administer: Is Able   All education points have been covered with patient? No, patient declined- printed education provided  [Patient wants all counseling info sent to his [facility] patient portal]   Welcome packet contents reviewed and discussed with patient? Yes   Assesment completed? No  [Patient wants all counseling info sent to his [facility] patient portal]   Plan Therapy being initiated   Do you need to open a clinical intervention (i-vent)? No   Do you want to schedule first shipment? Yes   Medication #1 Assessment Info    Patient status New medication, New to [facility]   Is this medication appropriate for the patient? Yes   Is this medication effective? Not yet started   Medication #2 Assessment Info    Patient status New medication, New to [facility]   Is this medication appropriate for the patient? Yes   Is this medication effective? Not yet started          Refill Questions - Documented Responses      Flowsheet Row Most Recent Value   Patient Availability and HIPAA Verification    Does patient want to proceed with activity? Yes   HIPAA/medical authority confirmed? Yes   Relationship to patient of person spoken to? Self   Refill Screening Questions    When does the patient need to receive the medication? 07/27/23   Refill Delivery Questions    How will the patient receive the medication? MEDRx   When does the patient need to receive the medication? 07/27/23   Shipping Address Home   Address in [facility] confirmed and updated if neccessary? Yes   Expected Copay ($) 163.19  [[employer] and [employer] are approved to cover this cost]   Is the patient able to afford the medication copay? Yes  "  Payment Method invoice (approval required)  [[employer] and [employer] are approved to cover this cost]   Days supply of Refill 21   Supplies needed? No supplies needed   Refill activity completed? Yes   Refill activity plan Refill scheduled   Shipment/Pickup Date: 07/26/23            Objective    He has a past medical history of Benign localized hyperplasia of prostate with urinary obstruction and other lower urinary tract symptoms (LUTS)(600.21), Brain lesion, CVA (cerebral vascular accident), Essential hypertension (07/12/2016), Glaucoma, Multinodular goiter, Seizures, Spontaneous ecchymoses, and Unspecified essential hypertension.    Tried/failed medications: None    BP Readings from Last 4 Encounters:   07/11/23 113/71   07/07/23 126/69   06/29/23 110/70   06/15/23 124/72     Ht Readings from Last 4 Encounters:   07/11/23 6' (1.829 m)   06/29/23 6' (1.829 m)   06/13/23 6' (1.829 m)   06/06/23 5' 6" (1.676 m)     Wt Readings from Last 4 Encounters:   07/11/23 76 kg (167 lb 8.8 oz)   07/07/23 75.5 kg (166 lb 7.2 oz)   06/29/23 77.5 kg (170 lb 14.4 oz)   06/13/23 76.6 kg (168 lb 14 oz)     Recent Labs   Lab Result Units 06/15/23  0945 06/15/23  0840 06/14/23  0431 06/14/23  0430 06/13/23 2003 06/13/23  1303 06/13/23  0810 06/07/23  0442 06/06/23  0604 06/05/23  1145   RBC M/uL 4.59 L  --  4.36 L  --   --   --  4.42 L 4.42 L 4.23 L 4.42 L   Hemoglobin g/dL 14.5  --  13.8 L  --   --   --  13.8 L 13.6 L 13.1 L 13.5 L   POC Hematocrit %PCV  --   --   --   --   --  32.0 L  --   --   --   --    Hematocrit % 40.4  --  38.9 L  --   --   --  39.9 L 39.6 L 37.8 L 39.8 L   WBC K/uL 15.45 H  --  12.24  --   --   --  8.64 11.22 6.46 6.72   Gran # (ANC) K/uL 12.2 H  --  8.8 H  --   --   --  5.1 8.6 H 5.1 4.6   Gran % % 78.9 H  --  71.8  --   --   --  59.5 76.5 H 79.5 H 67.9   Platelets K/uL 150  --  158  --   --   --  181 132 L 121 L 113 L   Sodium mmol/L  --  132 L  --  131 L 133 L  --  134 L 134 L 134 L 134 L   Potassium mmol/L  --  " 4.9  --  3.5  --   --  4.8 3.7 3.8 3.8   Chloride mmol/L  --  99  --  98  --   --  100 99 102 101   Glucose mg/dL  --  220 H  --  105  --   --  90 146 H 162 H 83   BUN mg/dL  --  17  --  12  --   --  26 H 12 12 18   Creatinine mg/dL  --  0.54  --  0.45 L  --   --  0.61 0.51 0.48 L 0.50   Calcium mg/dL  --  8.8  --  8.2 L  --   --  8.6 8.7 8.3 L 8.0 L   Total Protein g/dL  --   --   --   --   --   --  6.6 6.0 5.9 L 6.0   Albumin g/dL  --   --   --   --   --   --  3.6 3.2 L 3.1 L 3.2 L   Total Bilirubin mg/dL  --   --   --   --   --   --  0.9 0.2 0.3 0.6   Alkaline Phosphatase U/L  --   --   --   --   --   --  <20 L 41 38 22 L   AST U/L  --   --   --   --   --   --  43 31 37 52   ALT U/L  --   --   --   --   --   --  28 27 26 27     The goals of cancer treatment include:  Achieving remission of cancer, if possible  Reducing tumor size and spread of cancer, if remission is not possible  Minimizing pain and symptoms of the cancer  Preventing infection and other complications of treatment  Promoting adequate nutrition  Encouraging proper hydration  Improving or maintaining quality of life  Maintaining optimal therapy adherence  Minimizing and managing side effects    Goals of Therapy Status: Discussed (new start)    Assessment/Plan  Patient plans to start therapy on 07/27/23      Indication, dosage, appropriateness, effectiveness, safety and convenience of his specialty medication(s) were reviewed today.     Patient Education   Pharmacist offer to  patient was declined. Printed educational materials will be provided with medication.  Patient did accept verbal education on the following topics:  · Expectations and possible outcomes of therapy    Sending all counseling info to patient's MyChart [facility] portal    Tasks added this encounter   No tasks added.   Tasks due within next 3 months   7/26/2023 - Initial Clinical Assessment/Patient Education (180 Day Reassessment)  7/25/2023 - Set up Initial Fill     Mckinley  Hai, PharmD  [facility]  [address]  [facility] [address]  Phone: [phone]  Fax: [phone]

## 2023-07-28 ENCOUNTER — HOSPITAL ENCOUNTER (OUTPATIENT)
Dept: RADIATION THERAPY | Facility: HOSPITAL | Age: 78
Discharge: HOME | End: 2023-07-28
Attending: RADIOLOGY
Payer: MEDICARE

## 2023-07-28 PROCEDURE — 77334 PR  RADN TREATMENT AID(S) COMPLX: ICD-10-PCS | Mod: 26,,, | Performed by: RADIOLOGY

## 2023-07-28 PROCEDURE — 77014 HC CT GUIDANCE RADIATION THERAPY FLDS PLACEMENT: CPT | Mod: TC,PN | Performed by: RADIOLOGY

## 2023-07-28 PROCEDURE — 77334 RADIATION TREATMENT AID(S): CPT | Mod: TC,PN | Performed by: RADIOLOGY

## 2023-07-28 PROCEDURE — 77334 RADIATION TREATMENT AID(S): CPT | Mod: 26,,, | Performed by: RADIOLOGY

## 2023-07-28 PROCEDURE — 77014 PR  CT GUIDANCE PLACEMENT RAD THERAPY FIELDS: CPT | Mod: 26,,, | Performed by: RADIOLOGY

## 2023-07-28 PROCEDURE — 77263 THER RADIOLOGY TX PLNG CPLX: CPT | Mod: ,,, | Performed by: RADIOLOGY

## 2023-07-28 PROCEDURE — 77014 PR  CT GUIDANCE PLACEMENT RAD THERAPY FIELDS: ICD-10-PCS | Mod: 26,,, | Performed by: RADIOLOGY

## 2023-07-28 PROCEDURE — 77263 PR  RADIATION THERAPY PLAN COMPLEX: ICD-10-PCS | Mod: ,,, | Performed by: RADIOLOGY

## 2023-07-31 ENCOUNTER — TELEPHONE (OUTPATIENT)
Dept: NEUROSURGERY | Facility: CLINIC | Age: 78
End: 2023-07-31
Payer: MEDICARE

## 2023-07-31 NOTE — NURSING
S/W Pt and spouse. Stated radiation to begin 8/7/23. Instructed to hold chemo until the night before radiation. Reviewed chemo teaching with Pt. Scheduled for VV w/ Dr. Macias at completion of chemoRT for 8/30 at 10:30 am. Will schedule labs weekly while obtaining treatment. No further questions/concerns at this time.

## 2023-08-01 ENCOUNTER — HOSPITAL ENCOUNTER (OUTPATIENT)
Dept: RADIATION THERAPY | Facility: HOSPITAL | Age: 78
Discharge: HOME | End: 2023-08-01
Attending: RADIOLOGY
Payer: MEDICARE

## 2023-08-03 PROCEDURE — 77301 PR  INTEN MOD RADIOTHER PLAN W/DOSE VOL HIST: ICD-10-PCS | Mod: 26,,, | Performed by: RADIOLOGY

## 2023-08-03 PROCEDURE — 77301 RADIOTHERAPY DOSE PLAN IMRT: CPT | Mod: 26,,, | Performed by: RADIOLOGY

## 2023-08-03 PROCEDURE — 77301 RADIOTHERAPY DOSE PLAN IMRT: CPT | Mod: TC,PN | Performed by: RADIOLOGY

## 2023-08-04 ENCOUNTER — TELEPHONE (OUTPATIENT)
Dept: NEUROSURGERY | Facility: CLINIC | Age: 78
End: 2023-08-04
Payer: MEDICARE

## 2023-08-04 ENCOUNTER — TELEPHONE (OUTPATIENT)
Dept: NEUROLOGY | Facility: CLINIC | Age: 78
End: 2023-08-04
Payer: MEDICARE

## 2023-08-04 DIAGNOSIS — C71.9 GLIOBLASTOMA MULTIFORME OF BRAIN: Primary | ICD-10-CM

## 2023-08-04 PROCEDURE — 77470 PR  SPECIAL RADIATION TREATMENT: ICD-10-PCS | Mod: 26,59,, | Performed by: RADIOLOGY

## 2023-08-04 PROCEDURE — 77300 PR RADIATION THERAPY,DOSIMETRY PLAN: ICD-10-PCS | Mod: 26,,, | Performed by: RADIOLOGY

## 2023-08-04 PROCEDURE — 77470 SPECIAL RADIATION TREATMENT: CPT | Mod: 59,TC,PN | Performed by: RADIOLOGY

## 2023-08-04 PROCEDURE — 77300 RADIATION THERAPY DOSE PLAN: CPT | Mod: TC,PN | Performed by: RADIOLOGY

## 2023-08-04 PROCEDURE — 77338 DESIGN MLC DEVICE FOR IMRT: CPT | Mod: TC,PN | Performed by: RADIOLOGY

## 2023-08-04 PROCEDURE — 77470 SPECIAL RADIATION TREATMENT: CPT | Mod: 26,59,, | Performed by: RADIOLOGY

## 2023-08-04 PROCEDURE — 77338 PR  MLC IMRT DESIGN & CONSTRUCTION PER IMRT PLAN: ICD-10-PCS | Mod: 26,,, | Performed by: RADIOLOGY

## 2023-08-04 PROCEDURE — 77338 DESIGN MLC DEVICE FOR IMRT: CPT | Mod: 26,,, | Performed by: RADIOLOGY

## 2023-08-04 PROCEDURE — 77300 RADIATION THERAPY DOSE PLAN: CPT | Mod: 26,,, | Performed by: RADIOLOGY

## 2023-08-04 NOTE — TELEPHONE ENCOUNTER
SW called patient's wife to discuss her concerns with transportation assistance. SW informed her that [facility] does not provide transportation to non-emergent medical appointments, however there may be a few community resources available.    First, the  encouraged patient's wife to call the number on the back of their insurance card to ask about transportation benefits.     Secondly, SW encouraged pt to apply for the [facility]. SW shared that they have a transportation service that is for [employer] members, but they will take non-members with a 1/2 rate fee.    Lastly, ERIN informed patient of the [employer]'s Road to Recovery program. This program provides transportation to and from treatment for people with cancer who do not have a ride or are unable to drive themselves.     will compile a list of resources and share with the patient's wife. Pt's wife requested that SW sent them to the email attached to the patient's account.

## 2023-08-04 NOTE — TELEPHONE ENCOUNTER
----- Message from Emory Ross sent at 8/4/2023  1:26 PM CDT -----  Regarding: Advisement  Contact: @[phone]  Patients wife is calling because she would like to speak to someone as soon as possible. She says she has a lot of concerns that her and the patient would like to speak about. Please call and advise @[phone]

## 2023-08-04 NOTE — TELEPHONE ENCOUNTER
S/W spouse regarding radiation start date. Verified start date is 8/7/23 at 2:30 pm.     Reviewed chemo should be taken the night before radiation, to premedicate with zofran, and verified dosage to be given. Discussed medication to be take 7-days per week, even though radiation is 5-days per week. JELANI.

## 2023-08-04 NOTE — TELEPHONE ENCOUNTER
"S/W Pt spouse regarding questions/concerns about treatment plan.     Spoke at length about plan of care including concurrent chemoradiation for 3 weeks, chemo administration instructions, and follow up appointments/ lab monitoring. Expressed concern about urgency in getting started with treatment and "lack of communication" regarding the treatment plan. Apologized that there has been confusion/ambiguity with the treatment process and stated will send plan of care/chemo teaching in the mail as a reminder.     Inquired if transportation provided by [facility] to appointments would be possible to arrange. Will contact social work to assist.     Also stated that Pt has a tooth abscess that has been identified by his dentist and needs a root canal. Would like advisement on if he is able to obtain this while on treatment. Will route to Dr Macias for advisement.   "

## 2023-08-07 ENCOUNTER — TELEPHONE (OUTPATIENT)
Dept: RADIATION ONCOLOGY | Facility: CLINIC | Age: 78
End: 2023-08-07
Payer: MEDICARE

## 2023-08-07 ENCOUNTER — TELEPHONE (OUTPATIENT)
Dept: NEUROSURGERY | Facility: CLINIC | Age: 78
End: 2023-08-07
Payer: MEDICARE

## 2023-08-07 NOTE — TELEPHONE ENCOUNTER
Pt's wife called to confirm appointment today. Confirmed appointment for today at 230p with Radiation Oncology for Radiation Treatment.  Pt questioned why it wasn't showing in [facility].  Explained to wife Radiation Treatment appointments are not in [facility]. They are in a different program.

## 2023-08-07 NOTE — TELEPHONE ENCOUNTER
Incoming call from Pt spouse. Wanted to discuss that Pt missed first rad onc treatment because he did not take chemotherapy overnight. Pt spouse was also concerned that it was discussed with Dr. Lisa that radiation may effect Pt eyesight with treatment being close to optic nerve. Instructed to discuss with Dr. Lisa further if there were additional concerns. Explained that all treatments have a risk and it is up to them to decide risk vs benefit of treatment and what aligns with their goals and values. Reinforced that we will support whatever decision they make regarding treatment. This pleased spouse. Stated she will call Dr. Lisa's office to set up another appointment.

## 2023-08-08 ENCOUNTER — DOCUMENTATION ONLY (OUTPATIENT)
Dept: RADIATION ONCOLOGY | Facility: CLINIC | Age: 78
End: 2023-08-08
Payer: MEDICARE

## 2023-08-08 PROCEDURE — 77014 PR  CT GUIDANCE PLACEMENT RAD THERAPY FIELDS: ICD-10-PCS | Mod: 26,,, | Performed by: RADIOLOGY

## 2023-08-08 PROCEDURE — 77386 HC IMRT, COMPLEX: CPT | Mod: PN | Performed by: RADIOLOGY

## 2023-08-08 PROCEDURE — 77014 PR  CT GUIDANCE PLACEMENT RAD THERAPY FIELDS: CPT | Mod: 26,,, | Performed by: RADIOLOGY

## 2023-08-08 NOTE — PLAN OF CARE
First treatment complete.  Denies any concerns and no questions at this time.  Edcuation provided with pt and spouse.  Verbalized understanding

## 2023-08-09 ENCOUNTER — OFFICE VISIT (OUTPATIENT)
Dept: NEUROSURGERY | Facility: CLINIC | Age: 78
End: 2023-08-09
Payer: MEDICARE

## 2023-08-09 VITALS
HEIGHT: 72 IN | WEIGHT: 167.56 LBS | BODY MASS INDEX: 22.69 KG/M2 | SYSTOLIC BLOOD PRESSURE: 89 MMHG | HEART RATE: 62 BPM | RESPIRATION RATE: 18 BRPM | DIASTOLIC BLOOD PRESSURE: 58 MMHG

## 2023-08-09 DIAGNOSIS — C71.9 GLIOBLASTOMA MULTIFORME OF BRAIN: Primary | ICD-10-CM

## 2023-08-09 PROCEDURE — 3074F PR MOST RECENT SYSTOLIC BLOOD PRESSURE < 130 MM HG: ICD-10-PCS | Mod: CPTII,S$GLB,, | Performed by: NEUROLOGICAL SURGERY

## 2023-08-09 PROCEDURE — 77014 PR  CT GUIDANCE PLACEMENT RAD THERAPY FIELDS: ICD-10-PCS | Mod: 26,,, | Performed by: RADIOLOGY

## 2023-08-09 PROCEDURE — 1159F PR MEDICATION LIST DOCUMENTED IN MEDICAL RECORD: ICD-10-PCS | Mod: CPTII,S$GLB,, | Performed by: NEUROLOGICAL SURGERY

## 2023-08-09 PROCEDURE — 1126F AMNT PAIN NOTED NONE PRSNT: CPT | Mod: CPTII,S$GLB,, | Performed by: NEUROLOGICAL SURGERY

## 2023-08-09 PROCEDURE — 1126F PR PAIN SEVERITY QUANTIFIED, NO PAIN PRESENT: ICD-10-PCS | Mod: CPTII,S$GLB,, | Performed by: NEUROLOGICAL SURGERY

## 2023-08-09 PROCEDURE — 3078F PR MOST RECENT DIASTOLIC BLOOD PRESSURE < 80 MM HG: ICD-10-PCS | Mod: CPTII,S$GLB,, | Performed by: NEUROLOGICAL SURGERY

## 2023-08-09 PROCEDURE — 1101F PT FALLS ASSESS-DOCD LE1/YR: CPT | Mod: CPTII,S$GLB,, | Performed by: NEUROLOGICAL SURGERY

## 2023-08-09 PROCEDURE — 99024 PR POST-OP FOLLOW-UP VISIT: ICD-10-PCS | Mod: S$GLB,,, | Performed by: NEUROLOGICAL SURGERY

## 2023-08-09 PROCEDURE — 1159F MED LIST DOCD IN RCRD: CPT | Mod: CPTII,S$GLB,, | Performed by: NEUROLOGICAL SURGERY

## 2023-08-09 PROCEDURE — 3074F SYST BP LT 130 MM HG: CPT | Mod: CPTII,S$GLB,, | Performed by: NEUROLOGICAL SURGERY

## 2023-08-09 PROCEDURE — 77014 PR  CT GUIDANCE PLACEMENT RAD THERAPY FIELDS: CPT | Mod: 26,,, | Performed by: RADIOLOGY

## 2023-08-09 PROCEDURE — 3288F PR FALLS RISK ASSESSMENT DOCUMENTED: ICD-10-PCS | Mod: CPTII,S$GLB,, | Performed by: NEUROLOGICAL SURGERY

## 2023-08-09 PROCEDURE — 77386 HC IMRT, COMPLEX: CPT | Mod: PN | Performed by: RADIOLOGY

## 2023-08-09 PROCEDURE — 99024 POSTOP FOLLOW-UP VISIT: CPT | Mod: S$GLB,,, | Performed by: NEUROLOGICAL SURGERY

## 2023-08-09 PROCEDURE — 3078F DIAST BP <80 MM HG: CPT | Mod: CPTII,S$GLB,, | Performed by: NEUROLOGICAL SURGERY

## 2023-08-09 PROCEDURE — 3288F FALL RISK ASSESSMENT DOCD: CPT | Mod: CPTII,S$GLB,, | Performed by: NEUROLOGICAL SURGERY

## 2023-08-09 PROCEDURE — 1101F PR PT FALLS ASSESS DOC 0-1 FALLS W/OUT INJ PAST YR: ICD-10-PCS | Mod: CPTII,S$GLB,, | Performed by: NEUROLOGICAL SURGERY

## 2023-08-09 NOTE — PROGRESS NOTES
Neurosurgery History & Physical    Patient ID: Chacorta Gómez is a 78 y.o. male.    Chief Complaint   Patient presents with    Post-op Evaluation     POV, craniotomy 06/13/23.  Reports doing well since surgery.  States he is having BL foot swelling.        HPI:  Mr. Gómez is a 78-year-old male status post right frontal craniotomy and resection of glioblastoma on 06/13/2023.  He had a satellite lesion in the inferior right frontal lobe which was not resected given its remote location.  Therefore, his diagnosis is multifocal glioblastoma.    He has started Temodar therapy with Dr. Macias and radiation therapy with Dr. Lisa.      Since surgery he has been doing well with improving activity level.    Review of Systems  Denies fevers, chills, nausea, vomiting    Past Medical History:   Diagnosis Date    Benign localized hyperplasia of prostate with urinary obstruction and other lower urinary tract symptoms (LUTS)(600.21)     Brain lesion     CVA (cerebral vascular accident)     [facility]    Essential hypertension 07/12/2016    Glaucoma     bilat    Multinodular goiter     Seizures     Spontaneous ecchymoses     Unspecified essential hypertension      Social History     Socioeconomic History    Marital status:      Spouse name: Uyen    Number of children: 0   Tobacco Use    Smoking status: Never     Passive exposure: Never    Smokeless tobacco: Never   Substance and Sexual Activity    Alcohol use: Not Currently     Alcohol/week: 14.0 standard drinks of alcohol     Types: 7 Glasses of wine, 7 Cans of beer per week    Drug use: Never    Sexual activity: Yes     Partners: Female     Social Determinants of Health     Financial Resource Strain: Low Risk  (6/14/2023)    Overall Financial Resource Strain (CARDIA)     Difficulty of Paying Living Expenses: Not hard at all   Food Insecurity: No Food Insecurity (6/14/2023)    Hunger Vital Sign     Worried About Running Out of Food in the Last Year: Never  true     Ran Out of Food in the Last Year: Never true   Transportation Needs: No Transportation Needs (6/14/2023)    PRAPARE - Transportation     Lack of Transportation (Medical): No     Lack of Transportation (Non-Medical): No   Physical Activity: Sufficiently Active (6/14/2023)    Exercise Vital Sign     Days of Exercise per Week: 7 days     Minutes of Exercise per Session: 30 min   Stress: Stress Concern Present (6/14/2023)    [place] [employer] - Occupational Stress Questionnaire     Feeling of Stress : Rather much   Social Connections: Moderately Integrated (6/14/2023)    Social Connection and Isolation Panel [NHANES]     Frequency of Communication with Friends and Family: More than three times a week     Frequency of Social Gatherings with Friends and Family: More than three times a week     Attends Sikhism Services: More than 4 times per year     Active Member of Clubs or Organizations: No     Attends Club or Organization Meetings: Never     Marital Status:    Housing Stability: Low Risk  (6/14/2023)    Housing Stability Vital Sign     Unable to Pay for Housing in the Last Year: No     Number of Places Lived in the Last Year: 1     Unstable Housing in the Last Year: No     Family History   Problem Relation Age of Onset    Heart attack Mother     Hypertension Mother     Cancer Father         prostate     Hypertension Brother     Stroke Brother         mild      Review of patient's allergies indicates:  No Known Allergies    Current Outpatient Medications:     atorvastatin (LIPITOR) 40 MG tablet, Take 1 tablet (40 mg total) by mouth every evening., Disp: 90 tablet, Rfl: 3    chlorpheniramine (CHLOR-TRIMETON) 4 mg tablet, Take 4 mg by mouth daily as needed (sneezing attacks)., Disp: , Rfl:     divalproex (DEPAKOTE) 500 MG TbEC, Take 2 tablets (1,000 mg total) by mouth every 12 (twelve) hours., Disp: 120 tablet, Rfl: 2    dorzolamide-timolol 2-0.5% (COSOPT) 22.3-6.8 mg/mL ophthalmic  solution, Place 1 drop into both eyes 2 (two) times daily., Disp: , Rfl:     esomeprazole (NEXIUM) 40 MG capsule, Take 1 capsule (40 mg total) by mouth before breakfast., Disp: 90 capsule, Rfl: 1    famotidine (PEPCID) 20 MG tablet, Take 20 mg by mouth daily as needed for Heartburn., Disp: , Rfl:     finasteride (PROSCAR) 5 mg tablet, TAKE 1 TABLET EVERY DAY (Patient taking differently: Take 5 mg by mouth once daily.), Disp: 90 tablet, Rfl: 3    folic acid (FOLVITE) 1 MG tablet, Take 1 tablet (1 mg total) by mouth once daily., Disp: 90 tablet, Rfl: 3    hydroCHLOROthiazide (HYDRODIURIL) 12.5 MG Tab, TAKE 1 TABLET  BY MOUTH ONCE DAILY. (Patient taking differently: Take 12.5 mg by mouth once daily.), Disp: 90 tablet, Rfl: 3    lacosamide (VIMPAT) 100 mg Tab, Take 1 tablet (100 mg total) by mouth every 12 (twelve) hours. For additional seizure prevention, Disp: 60 tablet, Rfl: 0    latanoprost 0.005 % ophthalmic solution, Place 1 drop into both eyes every evening., Disp: , Rfl:     lisinopriL 10 MG tablet, Take 1 tablet (10 mg total) by mouth once daily., Disp: 90 tablet, Rfl: 3    loratadine (CLARITIN) 10 mg tablet, Take 10 mg by mouth once daily., Disp: , Rfl:     multivitamin Tab, Take 1 tablet by mouth once daily., Disp: 30 tablet, Rfl: 0    mupirocin (BACTROBAN) 2 % ointment, Apply topically 2 (two) times daily. To areas of abrasion/irritation on face/around lips-nose/left ear, Disp: 15 g, Rfl: 0    ondansetron (ZOFRAN) 8 MG tablet, Take 8mg (1 tab) 30-45 minutes prior to chemotherapy dose and every 8 hours as needed, Disp: 30 tablet, Rfl: 3    oxyCODONE-acetaminophen (PERCOCET) 7.5-325 mg per tablet, Take 1 tablet by mouth every 6 (six) hours as needed for Pain., Disp: 28 tablet, Rfl: 0    temozolomide (TEMODAR) 140 MG capsule, Take 1 capsule (140 mg total) by mouth once daily Take as directed days 1-21 (3 weeks). Take on an empty stomach. with 1 other temozolomide prescription for 145 mg total., Disp: 21  capsule, Rfl: 0    temozolomide (TEMODAR) 5 MG capsule, Take 1 capsule (5 mg total) by mouth once daily Take as directed days 1-21 (3 weeks). Take on an empty stomach. with 1 other temozolomide prescription for 145 mg total., Disp: 21 capsule, Rfl: 0    thiamine 100 MG tablet, Take 100 mg by mouth once daily., Disp: , Rfl:   Blood pressure (!) 89/58, pulse 62, resp. rate 18, height 6' (1.829 m), weight 76 kg (167 lb 8.8 oz).      Neurologic Exam  AAOx4, NAD  Strength   Deltoids Triceps Biceps Wrist Extension Wrist Flexion Hand    Upper: R 5/5 5/5 5/5 5/5 5/5 5/5     L 5/5 5/5 5/5 5/5 5/5 5/5       Iliopsoas Quadriceps Knee  Flexion Tibialis  anterior Gastro- cnemius EHL   Lower: R 5/5 5/5 5/5 5/5 5/5 5/5     L 5/5 5/5 5/5 5/5 5/5 5/5      Sensation grossly intact to light touch in bilateral upper and lower extremities    Incision well healed    Imaging:  MRI of the brain with and without contrast dated 07/24/2023 is personally reviewed and discussed with the patient.  There is some peripheral enhancement in the region of the right frontal lesion that has been resected.  Overall the edema in this area is markedly improved since surgery.  The inferior right frontal lesion has grown in size from the prior MRI.    Assessment/Plan:   Mr. Gómez is a 78-year-old gentleman with history of multifocal glioblastoma.  He is currently undergoing adjuvant therapy with Temodar and radiation.    Plan is to follow-up in 2 months with repeat MRI of the brain with and without contrast.

## 2023-08-10 ENCOUNTER — DOCUMENTATION ONLY (OUTPATIENT)
Dept: RADIATION ONCOLOGY | Facility: CLINIC | Age: 78
End: 2023-08-10
Payer: MEDICARE

## 2023-08-10 PROCEDURE — 77014 PR  CT GUIDANCE PLACEMENT RAD THERAPY FIELDS: ICD-10-PCS | Mod: 26,,, | Performed by: RADIOLOGY

## 2023-08-10 PROCEDURE — 77014 PR  CT GUIDANCE PLACEMENT RAD THERAPY FIELDS: CPT | Mod: 26,,, | Performed by: RADIOLOGY

## 2023-08-10 PROCEDURE — 77386 HC IMRT, COMPLEX: CPT | Mod: PN | Performed by: RADIOLOGY

## 2023-08-11 PROCEDURE — 77386 HC IMRT, COMPLEX: CPT | Mod: PN | Performed by: RADIOLOGY

## 2023-08-11 PROCEDURE — 77014 PR  CT GUIDANCE PLACEMENT RAD THERAPY FIELDS: CPT | Mod: 26,,, | Performed by: RADIOLOGY

## 2023-08-11 PROCEDURE — 77014 PR  CT GUIDANCE PLACEMENT RAD THERAPY FIELDS: ICD-10-PCS | Mod: 26,,, | Performed by: RADIOLOGY

## 2023-08-14 PROCEDURE — 77014 PR  CT GUIDANCE PLACEMENT RAD THERAPY FIELDS: ICD-10-PCS | Mod: 26,,, | Performed by: RADIOLOGY

## 2023-08-14 PROCEDURE — 77386 HC IMRT, COMPLEX: CPT | Mod: PN | Performed by: RADIOLOGY

## 2023-08-14 PROCEDURE — 77336 RADIATION PHYSICS CONSULT: CPT | Mod: PN | Performed by: RADIOLOGY

## 2023-08-14 PROCEDURE — 77014 PR  CT GUIDANCE PLACEMENT RAD THERAPY FIELDS: CPT | Mod: 26,,, | Performed by: RADIOLOGY

## 2023-08-15 ENCOUNTER — LAB VISIT (OUTPATIENT)
Dept: LAB | Facility: HOSPITAL | Age: 78
End: 2023-08-15
Attending: PSYCHIATRY & NEUROLOGY
Payer: MEDICARE

## 2023-08-15 DIAGNOSIS — C71.9 BRAIN NEOPLASM MALIGNANT: Primary | ICD-10-CM

## 2023-08-15 LAB
ALBUMIN SERPL BCP-MCNC: 3.2 G/DL (ref 3.5–5.2)
ALP SERPL-CCNC: 45 U/L (ref 55–135)
ALT SERPL W/O P-5'-P-CCNC: 16 U/L (ref 10–44)
ANION GAP SERPL CALC-SCNC: 12 MMOL/L (ref 8–16)
AST SERPL-CCNC: 29 U/L (ref 10–40)
BILIRUB SERPL-MCNC: 0.6 MG/DL (ref 0.1–1)
BUN SERPL-MCNC: 18 MG/DL (ref 8–23)
CALCIUM SERPL-MCNC: 9.1 MG/DL (ref 8.7–10.5)
CHLORIDE SERPL-SCNC: 102 MMOL/L (ref 95–110)
CO2 SERPL-SCNC: 26 MMOL/L (ref 23–29)
CREAT SERPL-MCNC: 1 MG/DL (ref 0.5–1.4)
EST. GFR  (NO RACE VARIABLE): >60 ML/MIN/1.73 M^2
GLUCOSE SERPL-MCNC: 112 MG/DL (ref 70–110)
POTASSIUM SERPL-SCNC: 3.9 MMOL/L (ref 3.5–5.1)
PROT SERPL-MCNC: 6.1 G/DL (ref 6–8.4)
SODIUM SERPL-SCNC: 140 MMOL/L (ref 136–145)

## 2023-08-15 PROCEDURE — 80053 COMPREHEN METABOLIC PANEL: CPT | Performed by: PSYCHIATRY & NEUROLOGY

## 2023-08-15 PROCEDURE — 77014 PR  CT GUIDANCE PLACEMENT RAD THERAPY FIELDS: CPT | Mod: 26,,, | Performed by: RADIOLOGY

## 2023-08-15 PROCEDURE — G0180 MD CERTIFICATION HHA PATIENT: HCPCS | Mod: ,,, | Performed by: PSYCHIATRY & NEUROLOGY

## 2023-08-15 PROCEDURE — 85025 COMPLETE CBC W/AUTO DIFF WBC: CPT | Performed by: PSYCHIATRY & NEUROLOGY

## 2023-08-15 PROCEDURE — 77014 PR  CT GUIDANCE PLACEMENT RAD THERAPY FIELDS: ICD-10-PCS | Mod: 26,,, | Performed by: RADIOLOGY

## 2023-08-15 PROCEDURE — 77386 HC IMRT, COMPLEX: CPT | Mod: PN | Performed by: RADIOLOGY

## 2023-08-15 PROCEDURE — G0180 PR HOME HEALTH MD CERTIFICATION: ICD-10-PCS | Mod: ,,, | Performed by: PSYCHIATRY & NEUROLOGY

## 2023-08-16 LAB
BASOPHILS # BLD AUTO: 0.04 K/UL (ref 0–0.2)
BASOPHILS NFR BLD: 0.4 % (ref 0–1.9)
DIFFERENTIAL METHOD: ABNORMAL
EOSINOPHIL # BLD AUTO: 0.3 K/UL (ref 0–0.5)
EOSINOPHIL NFR BLD: 3.6 % (ref 0–8)
ERYTHROCYTE [DISTWIDTH] IN BLOOD BY AUTOMATED COUNT: 17.8 % (ref 11.5–14.5)
HCT VFR BLD AUTO: 33.9 % (ref 40–54)
HGB BLD-MCNC: 10.3 G/DL (ref 14–18)
IMM GRANULOCYTES # BLD AUTO: 0.05 K/UL (ref 0–0.04)
IMM GRANULOCYTES NFR BLD AUTO: 0.5 % (ref 0–0.5)
LYMPHOCYTES # BLD AUTO: 2.5 K/UL (ref 1–4.8)
LYMPHOCYTES NFR BLD: 27.6 % (ref 18–48)
MCH RBC QN AUTO: 32.6 PG (ref 27–31)
MCHC RBC AUTO-ENTMCNC: 30.4 G/DL (ref 32–36)
MCV RBC AUTO: 107 FL (ref 82–98)
MONOCYTES # BLD AUTO: 1.8 K/UL (ref 0.3–1)
MONOCYTES NFR BLD: 19.6 % (ref 4–15)
NEUTROPHILS # BLD AUTO: 4.4 K/UL (ref 1.8–7.7)
NEUTROPHILS NFR BLD: 48.3 % (ref 38–73)
NRBC BLD-RTO: 0 /100 WBC
PLATELET # BLD AUTO: 297 K/UL (ref 150–450)
PMV BLD AUTO: 10.9 FL (ref 9.2–12.9)
RBC # BLD AUTO: 3.16 M/UL (ref 4.6–6.2)
WBC # BLD AUTO: 9.12 K/UL (ref 3.9–12.7)

## 2023-08-16 PROCEDURE — 77386 HC IMRT, COMPLEX: CPT | Mod: PN | Performed by: RADIOLOGY

## 2023-08-16 PROCEDURE — 77014 PR  CT GUIDANCE PLACEMENT RAD THERAPY FIELDS: CPT | Mod: 26,,, | Performed by: RADIOLOGY

## 2023-08-16 PROCEDURE — 77014 PR  CT GUIDANCE PLACEMENT RAD THERAPY FIELDS: ICD-10-PCS | Mod: 26,,, | Performed by: RADIOLOGY

## 2023-08-17 ENCOUNTER — DOCUMENTATION ONLY (OUTPATIENT)
Dept: RADIATION ONCOLOGY | Facility: CLINIC | Age: 78
End: 2023-08-17
Payer: MEDICARE

## 2023-08-17 PROCEDURE — 77014 PR  CT GUIDANCE PLACEMENT RAD THERAPY FIELDS: CPT | Mod: 26,,, | Performed by: RADIOLOGY

## 2023-08-17 PROCEDURE — 77386 HC IMRT, COMPLEX: CPT | Mod: PN | Performed by: RADIOLOGY

## 2023-08-17 PROCEDURE — 77014 PR  CT GUIDANCE PLACEMENT RAD THERAPY FIELDS: ICD-10-PCS | Mod: 26,,, | Performed by: RADIOLOGY

## 2023-08-18 PROCEDURE — 77386 HC IMRT, COMPLEX: CPT | Mod: PN | Performed by: RADIOLOGY

## 2023-08-18 PROCEDURE — 77014 PR  CT GUIDANCE PLACEMENT RAD THERAPY FIELDS: ICD-10-PCS | Mod: 26,,, | Performed by: RADIOLOGY

## 2023-08-18 PROCEDURE — 77014 PR  CT GUIDANCE PLACEMENT RAD THERAPY FIELDS: CPT | Mod: 26,,, | Performed by: RADIOLOGY

## 2023-08-21 PROCEDURE — 77014 PR  CT GUIDANCE PLACEMENT RAD THERAPY FIELDS: ICD-10-PCS | Mod: 26,,, | Performed by: RADIOLOGY

## 2023-08-21 PROCEDURE — 77014 PR  CT GUIDANCE PLACEMENT RAD THERAPY FIELDS: CPT | Mod: 26,,, | Performed by: RADIOLOGY

## 2023-08-21 PROCEDURE — 77336 RADIATION PHYSICS CONSULT: CPT | Mod: PN | Performed by: RADIOLOGY

## 2023-08-21 PROCEDURE — 77386 HC IMRT, COMPLEX: CPT | Mod: PN | Performed by: RADIOLOGY

## 2023-08-22 PROCEDURE — 77386 HC IMRT, COMPLEX: CPT | Mod: PN | Performed by: RADIOLOGY

## 2023-08-22 PROCEDURE — 77014 PR  CT GUIDANCE PLACEMENT RAD THERAPY FIELDS: ICD-10-PCS | Mod: 26,,, | Performed by: RADIOLOGY

## 2023-08-22 PROCEDURE — 77014 PR  CT GUIDANCE PLACEMENT RAD THERAPY FIELDS: CPT | Mod: 26,,, | Performed by: RADIOLOGY

## 2023-08-22 PROCEDURE — 77014 HC CT GUIDANCE RADIATION THERAPY FLDS PLACEMENT: CPT | Mod: TC,PN | Performed by: RADIOLOGY

## 2023-08-23 PROCEDURE — 77014 PR  CT GUIDANCE PLACEMENT RAD THERAPY FIELDS: ICD-10-PCS | Mod: 26,,, | Performed by: RADIOLOGY

## 2023-08-23 PROCEDURE — 77386 HC IMRT, COMPLEX: CPT | Mod: PN | Performed by: RADIOLOGY

## 2023-08-23 PROCEDURE — 77014 PR  CT GUIDANCE PLACEMENT RAD THERAPY FIELDS: CPT | Mod: 26,,, | Performed by: RADIOLOGY

## 2023-08-24 ENCOUNTER — DOCUMENTATION ONLY (OUTPATIENT)
Dept: RADIATION ONCOLOGY | Facility: CLINIC | Age: 78
End: 2023-08-24
Payer: MEDICARE

## 2023-08-24 ENCOUNTER — LAB VISIT (OUTPATIENT)
Dept: LAB | Facility: HOSPITAL | Age: 78
End: 2023-08-24
Attending: PSYCHIATRY & NEUROLOGY
Payer: MEDICARE

## 2023-08-24 DIAGNOSIS — I10 ESSENTIAL HYPERTENSION, MALIGNANT: Primary | ICD-10-CM

## 2023-08-24 DIAGNOSIS — C71.9 GLIOBLASTOMA DETERMINED BY BIOPSY OF BRAIN: Primary | ICD-10-CM

## 2023-08-24 LAB
BASOPHILS # BLD AUTO: 0.03 K/UL (ref 0–0.2)
BASOPHILS NFR BLD: 0.6 % (ref 0–1.9)
DIFFERENTIAL METHOD: ABNORMAL
EOSINOPHIL # BLD AUTO: 0.2 K/UL (ref 0–0.5)
EOSINOPHIL NFR BLD: 3.7 % (ref 0–8)
ERYTHROCYTE [DISTWIDTH] IN BLOOD BY AUTOMATED COUNT: 15.2 % (ref 11.5–14.5)
HCT VFR BLD AUTO: 35.1 % (ref 40–54)
HGB BLD-MCNC: 11.5 G/DL (ref 14–18)
IMM GRANULOCYTES # BLD AUTO: 0.03 K/UL (ref 0–0.04)
IMM GRANULOCYTES NFR BLD AUTO: 0.6 % (ref 0–0.5)
LYMPHOCYTES # BLD AUTO: 2.2 K/UL (ref 1–4.8)
LYMPHOCYTES NFR BLD: 43.6 % (ref 18–48)
MCH RBC QN AUTO: 32.4 PG (ref 27–31)
MCHC RBC AUTO-ENTMCNC: 32.8 G/DL (ref 32–36)
MCV RBC AUTO: 99 FL (ref 82–98)
MONOCYTES # BLD AUTO: 0.8 K/UL (ref 0.3–1)
MONOCYTES NFR BLD: 14.7 % (ref 4–15)
NEUTROPHILS # BLD AUTO: 1.9 K/UL (ref 1.8–7.7)
NEUTROPHILS NFR BLD: 36.8 % (ref 38–73)
NRBC BLD-RTO: 0 /100 WBC
PLATELET # BLD AUTO: 186 K/UL (ref 150–450)
PMV BLD AUTO: 11.1 FL (ref 9.2–12.9)
RBC # BLD AUTO: 3.55 M/UL (ref 4.6–6.2)
WBC # BLD AUTO: 5.09 K/UL (ref 3.9–12.7)

## 2023-08-24 PROCEDURE — 77014 PR  CT GUIDANCE PLACEMENT RAD THERAPY FIELDS: ICD-10-PCS | Mod: 26,,, | Performed by: RADIOLOGY

## 2023-08-24 PROCEDURE — 85025 COMPLETE CBC W/AUTO DIFF WBC: CPT | Performed by: PSYCHIATRY & NEUROLOGY

## 2023-08-24 PROCEDURE — 77386 HC IMRT, COMPLEX: CPT | Mod: PN | Performed by: RADIOLOGY

## 2023-08-24 PROCEDURE — 77014 PR  CT GUIDANCE PLACEMENT RAD THERAPY FIELDS: CPT | Mod: 26,,, | Performed by: RADIOLOGY

## 2023-08-25 PROCEDURE — 77386 HC IMRT, COMPLEX: CPT | Mod: PN | Performed by: RADIOLOGY

## 2023-08-25 PROCEDURE — 77014 PR  CT GUIDANCE PLACEMENT RAD THERAPY FIELDS: CPT | Mod: 26,,, | Performed by: RADIOLOGY

## 2023-08-25 PROCEDURE — 77014 PR  CT GUIDANCE PLACEMENT RAD THERAPY FIELDS: ICD-10-PCS | Mod: 26,,, | Performed by: RADIOLOGY

## 2023-08-28 ENCOUNTER — DOCUMENTATION ONLY (OUTPATIENT)
Dept: RADIATION ONCOLOGY | Facility: CLINIC | Age: 78
End: 2023-08-28
Payer: MEDICARE

## 2023-08-28 PROCEDURE — 77386 HC IMRT, COMPLEX: CPT | Mod: PN | Performed by: RADIOLOGY

## 2023-08-28 PROCEDURE — 77336 RADIATION PHYSICS CONSULT: CPT | Mod: PN | Performed by: RADIOLOGY

## 2023-08-28 PROCEDURE — 77014 PR  CT GUIDANCE PLACEMENT RAD THERAPY FIELDS: ICD-10-PCS | Mod: 26,,, | Performed by: RADIOLOGY

## 2023-08-28 PROCEDURE — 77014 PR  CT GUIDANCE PLACEMENT RAD THERAPY FIELDS: CPT | Mod: 26,,, | Performed by: RADIOLOGY

## 2023-08-28 NOTE — PLAN OF CARE
Pt completed all treatments.  Seen by MD with all questions and concerns addressed.  Followup instructions and appointments provided.  Pt and spouse verbalized understanidng

## 2023-08-29 ENCOUNTER — TELEPHONE (OUTPATIENT)
Dept: NEUROSURGERY | Facility: CLINIC | Age: 78
End: 2023-08-29
Payer: MEDICARE

## 2023-08-29 ENCOUNTER — EXTERNAL HOME HEALTH (OUTPATIENT)
Dept: HOME HEALTH SERVICES | Facility: HOSPITAL | Age: 78
End: 2023-08-29
Payer: MEDICARE

## 2023-08-29 NOTE — TELEPHONE ENCOUNTER
----- Message from Rosy Burroughs sent at 8/29/2023 12:30 PM CDT -----  Regarding: Wound care orders  Contact: [phone] Zoie  Zoie with Home Health called in and is requesting a call back. Zoie stated pt had a fall last Friday and she needs new wound care orders. Please call to further discuss. Thanks

## 2023-08-29 NOTE — TELEPHONE ENCOUNTER
Reports Pt fell a few days ago. No LOC or other injuries reported. Small skin tear to BUE noted. Cleaned with soap and water and dressed with foam dressing. Verbal order for wound care given. No further questions/concerns at this time.

## 2023-08-30 ENCOUNTER — TELEPHONE (OUTPATIENT)
Dept: NEUROLOGY | Facility: CLINIC | Age: 78
End: 2023-08-30
Payer: MEDICARE

## 2023-08-30 NOTE — TELEPHONE ENCOUNTER
Called and spoke to patient to confirm if he was able to log onto his virtual visit today with Dr. Macias. Patient stated that his Internet was down at his house and he would not be able to log on successfully. Stated that I could reschedule his appointment at his convenience and he requested that his wife call us back when she is home to reschedule his appointment.

## 2023-08-31 ENCOUNTER — TELEPHONE (OUTPATIENT)
Dept: NEUROLOGY | Facility: CLINIC | Age: 78
End: 2023-08-31
Payer: MEDICARE

## 2023-08-31 DIAGNOSIS — C71.9 GLIOBLASTOMA DETERMINED BY BIOPSY OF BRAIN: Primary | ICD-10-CM

## 2023-08-31 NOTE — TELEPHONE ENCOUNTER
I had offered them this the first visit too. I'm happy to see them virtually or in person. I'll discuss again with them at next visit.

## 2023-08-31 NOTE — NURSING
"Incoming call from Pt spouse.     Reports finishing chemoRT on Monday. Had technical difficulties on VV yesterday and was not able to complete visit. Describes transportation issues to appointments on [facility] [place] [facility]. Explained that RN could assist in setting Pt up with oncologist closer to home with additional consultation of Dr. Macias as needed. Pt spouse wishes to speak to Dr. Macias directly about this.     Additionally expressed concerns regarding financial strains of cancer diagnosis and frequent appointments. Will pend order for oncology social work assistance.     F/u appointment rescheduled to 9/6 at 2 pm. Will send appointment reminder in mail along with map to campus. Direct phone number provided.   "

## 2023-09-05 ENCOUNTER — DOCUMENT SCAN (OUTPATIENT)
Dept: HOME HEALTH SERVICES | Facility: HOSPITAL | Age: 78
End: 2023-09-05
Payer: MEDICARE

## 2023-09-05 NOTE — PROGRESS NOTES
"Subjective:       Patient ID: Chacorta Gómez is a 78 y.o. male.    Chief Complaint: glioblastoma    HPI  09/06/2023  Presents today for Neuro-Oncology follow up. Feels overall ok; reports headache today. Bilateral lower extremity swelling has worsened over months. "Shaking" is getting worse. Requires refills for seizure/stroke medications. Finds it discouraging that the radiation therapy/chemotherapy hasn't helped his symptoms. Balance also hasn't improved; notes 3-4 falls over the past 3 months, most recently lost his balance a week prior. While in [place] (04/2023) had a seizure and was worked up in hospital. Wife also notes patient gets confused; he [place] [facility] [place] of [place] "[place] in the woods." Wife has to remind patient that he was treated at [facility]. He also wakes up in the middle of the night confused; ongoing for several months. Wife states she thinks things are overall worse. Reports significant sleepiness due to less restful sleep. Also confirms feeling "down" from symptoms not improving. Lastly, they state undergoing financial issues recently.     07/11/2023  Mr. Gómez is a 77 y.o. male who is being seen for an initial consult. He's been recovering well from surgery. He is complaining of decreased focus and slowing down. He complained of fatigue. Doesn't need any assistance with ADLs. Able to walk one block with help of cane. Complained of unstable gait sometimes. Denies any headaches, no recurrent seizures on lacosamide/keppra. Here with his wife. Remains on dexamethasone per PCP.     Oncologic History:   2/2023: admitted to outside hospital for new onset seizure, he was started on keppra. MRI brain was done and was reportedly suspicious for subacute infarct.   4-5/2023: was in [place] for a work trip, had recurrent seizures, was hospitalized there for 10 days.   6/2023: hospitalized for breakthrough seizures, lacosamide was added, underwent repeat MRI brain which " showed 4.2 x 2.6 x 4.1 cm heterogeneous enhancing hemorrhagic medial right frontal lesion. A 2nd lesion along the planum sphenoidale has a dural base but appears to also involve the inferior frontal lobe parenchyma. Patient was seen by neurosurgery and underwent subtotal resection. Pathology consistent with GBM.   8/2023: chemoradiation to 40Gy over 15fx (Alexa Lisa)    Past Medical History:   Diagnosis Date    Benign localized hyperplasia of prostate with urinary obstruction and other lower urinary tract symptoms (LUTS)(600.21)     Brain lesion     CVA (cerebral vascular accident)     [facility]    Essential hypertension 07/12/2016    Glaucoma     bilat    Multinodular goiter     Seizures     Spontaneous ecchymoses     Unspecified essential hypertension       Current Outpatient Medications   Medication Sig Dispense Refill    chlorpheniramine (CHLOR-TRIMETON) 4 mg tablet Take 4 mg by mouth daily as needed (sneezing attacks).      dorzolamide-timolol 2-0.5% (COSOPT) 22.3-6.8 mg/mL ophthalmic solution Place 1 drop into both eyes 2 (two) times daily.      esomeprazole (NEXIUM) 40 MG capsule Take 1 capsule (40 mg total) by mouth before breakfast. 90 capsule 1    famotidine (PEPCID) 20 MG tablet Take 20 mg by mouth daily as needed for Heartburn.      finasteride (PROSCAR) 5 mg tablet TAKE 1 TABLET EVERY DAY (Patient taking differently: Take 5 mg by mouth once daily.) 90 tablet 3    folic acid (FOLVITE) 1 MG tablet Take 1 tablet (1 mg total) by mouth once daily. 90 tablet 3    furosemide (LASIX) 20 MG tablet Take 1 tablet (20 mg total) by mouth daily as needed (swelling). 30 tablet 0    hydroCHLOROthiazide (HYDRODIURIL) 12.5 MG Tab TAKE 1 TABLET EVERY DAY 90 tablet 3    latanoprost 0.005 % ophthalmic solution Place 1 drop into both eyes every evening.      lisinopriL 10 MG tablet Take 1 tablet (10 mg total) by mouth once daily. 90 tablet 3    loratadine (CLARITIN) 10 mg tablet Take 10 mg by mouth once daily.       multivitamin Tab Take 1 tablet by mouth once daily. 30 tablet 0    mupirocin (BACTROBAN) 2 % ointment Apply topically 2 (two) times daily. To areas of abrasion/irritation on face/around lips-nose/left ear 15 g 0    ondansetron (ZOFRAN) 8 MG tablet Take 8mg (1 tab) 30-45 minutes prior to chemotherapy dose and every 8 hours as needed 30 tablet 3    atorvastatin (LIPITOR) 40 MG tablet Take 1 tablet (40 mg total) by mouth every evening. 90 tablet 3    divalproex (DEPAKOTE) 500 MG TbEC Take 2 tablets (1,000 mg total) by mouth every 12 (twelve) hours. 120 tablet 2    lacosamide (VIMPAT) 100 mg Tab Take 1 tablet (100 mg total) by mouth every 12 (twelve) hours. For additional seizure prevention 60 tablet 0    oxyCODONE-acetaminophen (PERCOCET) 7.5-325 mg per tablet Take 1 tablet by mouth every 6 (six) hours as needed for Pain. (Patient not taking: Reported on 9/6/2023) 28 tablet 0    thiamine 100 MG tablet Take 100 mg by mouth once daily.       No current facility-administered medications for this visit.      Past Surgical History:   Procedure Laterality Date    COLONOSCOPY  08/03/2009    Dr. Askew    CRANIOTOMY FOR EXCISION OF INTRACRANIAL TUMOR      CRANIOTOMY, WITH NEOPLASM EXCISION USING COMPUTER-ASSISTED NAVIGATION Right 6/13/2023    Procedure: CRANIOTOMY, WITH NEOPLASM EXCISION USING COMPUTER-ASSISTED NAVIGATION -     RIGHT FRONTAL;  Surgeon: Joao English MD;  Location: [facility];  Service: Neurosurgery;  Laterality: Right;    NASAL SEPTUM SURGERY      TONSILLECTOMY        Family History   Problem Relation Age of Onset    Heart attack Mother     Hypertension Mother     Cancer Father         prostate     Hypertension Brother     Stroke Brother         mild       Social History     Tobacco Use    Smoking status: Never     Passive exposure: Never    Smokeless tobacco: Never   Substance Use Topics    Alcohol use: Not Currently     Alcohol/week: 14.0 standard drinks of alcohol     Types: 7 Glasses of wine, 7 Cans  of beer per week    Drug use: Never      Review of Systems  KPS: 80        Objective:      Vitals:    09/06/23 1403   BP: 98/70   Pulse: (!) 48              Assessment:       Problem List Items Addressed This Visit          Neuro    Seizure disorder    Relevant Medications    divalproex (DEPAKOTE) 500 MG TbEC    lacosamide (VIMPAT) 100 mg Tab       Oncology    Glioblastoma determined by biopsy of brain - Primary    Relevant Orders    Ambulatory referral/consult to Physical/Occupational Therapy    Ambulatory referral/consult to HOME Palliative Care     Other Visit Diagnoses       Essential (primary) hypertension        Relevant Medications    atorvastatin (LIPITOR) 40 MG tablet    At risk for foot problem        Relevant Orders    Ambulatory consult to Podiatry    Debility        Relevant Orders    Ambulatory referral/consult to Physical/Occupational Therapy    Ambulatory referral/consult to HOME Palliative Care    Fatigue, unspecified type        Tremor                Plan:       Chacorta Gómez is a 78 y.o. male with a glioblastoma, MGMT-methylated, IDH-wt, WHO grade 4 s/p subtotal resection and chemoradiation to 40Gy over 15fx presenting for treatment recommendations. Tumor diagnosis was heralded by seizure.    Glioblastoma  Completed chemoradiation. With expected symptoms of fatigue. Follow up in 1 month with MRI brain and CBC/CMP in anticipation of adjuvant TMZ.    Fatigue/debility  Discussed that this is an expected symptom from radiation and should improve over time. Referral to integrative oncology placed at last visit.    At risk for foot problem  With toenail and skin issues on the foot 2/2 edema. Referral to podiatry placed.    Tremor  Will continue to monitor as patient recovers from radiation treatment    Seizures  Continue levetiracetam 1g bid and lacosamide 100mg bid    Hypertension  Currently on triple therapy with low BP today. Will defer management to PCP.          Billing level 5 supported  by:    MDM  Problems addressed  Level 5: 1 acute/chronic illness or injury that poses a threat to life or bodily function    Amount/Complexity  Level 4: Tests/documents/independent historians    Risk  Level 5: Drug therapy requiring intensive monitoring for toxicity            Lucita Macias MD  Department of Neurology  Neuro-Oncology, Movement Disorders

## 2023-09-06 ENCOUNTER — OFFICE VISIT (OUTPATIENT)
Dept: NEUROLOGY | Facility: CLINIC | Age: 78
End: 2023-09-06
Payer: MEDICARE

## 2023-09-06 VITALS
DIASTOLIC BLOOD PRESSURE: 70 MMHG | SYSTOLIC BLOOD PRESSURE: 98 MMHG | HEIGHT: 72 IN | WEIGHT: 162.94 LBS | HEART RATE: 48 BPM | BODY MASS INDEX: 22.07 KG/M2

## 2023-09-06 DIAGNOSIS — R53.83 FATIGUE, UNSPECIFIED TYPE: ICD-10-CM

## 2023-09-06 DIAGNOSIS — Z91.89 AT RISK FOR FOOT PROBLEM: ICD-10-CM

## 2023-09-06 DIAGNOSIS — R53.81 DEBILITY: ICD-10-CM

## 2023-09-06 DIAGNOSIS — G40.909 SEIZURE DISORDER: ICD-10-CM

## 2023-09-06 DIAGNOSIS — I10 ESSENTIAL (PRIMARY) HYPERTENSION: ICD-10-CM

## 2023-09-06 DIAGNOSIS — C71.9 GLIOBLASTOMA DETERMINED BY BIOPSY OF BRAIN: Primary | ICD-10-CM

## 2023-09-06 DIAGNOSIS — R25.1 TREMOR: ICD-10-CM

## 2023-09-06 PROCEDURE — 1125F AMNT PAIN NOTED PAIN PRSNT: CPT | Mod: CPTII,S$GLB,, | Performed by: PSYCHIATRY & NEUROLOGY

## 2023-09-06 PROCEDURE — 99999 PR PBB SHADOW E&M-EST. PATIENT-LVL V: ICD-10-PCS | Mod: PBBFAC,,, | Performed by: PSYCHIATRY & NEUROLOGY

## 2023-09-06 PROCEDURE — 1125F PR PAIN SEVERITY QUANTIFIED, PAIN PRESENT: ICD-10-PCS | Mod: CPTII,S$GLB,, | Performed by: PSYCHIATRY & NEUROLOGY

## 2023-09-06 PROCEDURE — 3078F PR MOST RECENT DIASTOLIC BLOOD PRESSURE < 80 MM HG: ICD-10-PCS | Mod: CPTII,S$GLB,, | Performed by: PSYCHIATRY & NEUROLOGY

## 2023-09-06 PROCEDURE — 1159F PR MEDICATION LIST DOCUMENTED IN MEDICAL RECORD: ICD-10-PCS | Mod: CPTII,S$GLB,, | Performed by: PSYCHIATRY & NEUROLOGY

## 2023-09-06 PROCEDURE — 3288F PR FALLS RISK ASSESSMENT DOCUMENTED: ICD-10-PCS | Mod: CPTII,S$GLB,, | Performed by: PSYCHIATRY & NEUROLOGY

## 2023-09-06 PROCEDURE — 3074F PR MOST RECENT SYSTOLIC BLOOD PRESSURE < 130 MM HG: ICD-10-PCS | Mod: CPTII,S$GLB,, | Performed by: PSYCHIATRY & NEUROLOGY

## 2023-09-06 PROCEDURE — 99215 OFFICE O/P EST HI 40 MIN: CPT | Mod: S$GLB,,, | Performed by: PSYCHIATRY & NEUROLOGY

## 2023-09-06 PROCEDURE — 99215 PR OFFICE/OUTPT VISIT, EST, LEVL V, 40-54 MIN: ICD-10-PCS | Mod: S$GLB,,, | Performed by: PSYCHIATRY & NEUROLOGY

## 2023-09-06 PROCEDURE — 1159F MED LIST DOCD IN RCRD: CPT | Mod: CPTII,S$GLB,, | Performed by: PSYCHIATRY & NEUROLOGY

## 2023-09-06 PROCEDURE — 1100F PTFALLS ASSESS-DOCD GE2>/YR: CPT | Mod: CPTII,S$GLB,, | Performed by: PSYCHIATRY & NEUROLOGY

## 2023-09-06 PROCEDURE — 1100F PR PT FALLS ASSESS DOC 2+ FALLS/FALL W/INJURY/YR: ICD-10-PCS | Mod: CPTII,S$GLB,, | Performed by: PSYCHIATRY & NEUROLOGY

## 2023-09-06 PROCEDURE — 99999 PR PBB SHADOW E&M-EST. PATIENT-LVL V: CPT | Mod: PBBFAC,,, | Performed by: PSYCHIATRY & NEUROLOGY

## 2023-09-06 PROCEDURE — 3288F FALL RISK ASSESSMENT DOCD: CPT | Mod: CPTII,S$GLB,, | Performed by: PSYCHIATRY & NEUROLOGY

## 2023-09-06 PROCEDURE — 3074F SYST BP LT 130 MM HG: CPT | Mod: CPTII,S$GLB,, | Performed by: PSYCHIATRY & NEUROLOGY

## 2023-09-06 PROCEDURE — 3078F DIAST BP <80 MM HG: CPT | Mod: CPTII,S$GLB,, | Performed by: PSYCHIATRY & NEUROLOGY

## 2023-09-06 RX ORDER — LACOSAMIDE 100 MG/1
100 TABLET ORAL EVERY 12 HOURS
Qty: 60 TABLET | Refills: 0 | Status: SHIPPED | OUTPATIENT
Start: 2023-09-06 | End: 2024-01-25

## 2023-09-06 RX ORDER — DIVALPROEX SODIUM 500 MG/1
1000 TABLET, DELAYED RELEASE ORAL EVERY 12 HOURS
Qty: 120 TABLET | Refills: 2 | Status: SHIPPED | OUTPATIENT
Start: 2023-09-06 | End: 2023-11-06 | Stop reason: SDUPTHER

## 2023-09-06 RX ORDER — ATORVASTATIN CALCIUM 40 MG/1
40 TABLET, FILM COATED ORAL NIGHTLY
Qty: 90 TABLET | Refills: 3 | Status: SHIPPED | OUTPATIENT
Start: 2023-09-06 | End: 2024-01-25

## 2023-09-06 NOTE — Clinical Note
Denny Dupont, wanted to loop you in on Mr. Gómez. He is on 3 BP medications currently and was hypotensive today. He has significant pitting edema in BLE. Could you please advise the patient on edema/BP management? Thank you

## 2023-09-08 ENCOUNTER — TELEPHONE (OUTPATIENT)
Dept: NEUROLOGY | Facility: CLINIC | Age: 78
End: 2023-09-08
Payer: MEDICARE

## 2023-09-08 NOTE — TELEPHONE ENCOUNTER
Called and spoke to patient's spouse in regards to scheduling patient's follow up appointment with Dr. Macias. Spouse confirmed MRI was already scheduled for 10/11 and that the follow up on 10/26 would work for them. Printed AVS for them after the visit, but spouse says that she lost it or misplaced it. Advised I could print another and mail it and I would be on the look out around the clinic in case it was misplaced here on their way out. Spouse confirmed date and time of upcoming appointments.

## 2023-09-11 ENCOUNTER — TELEPHONE (OUTPATIENT)
Dept: NEUROLOGY | Facility: CLINIC | Age: 78
End: 2023-09-11
Payer: MEDICARE

## 2023-09-11 NOTE — TELEPHONE ENCOUNTER
Called and spoke to patient's spouse in regards to scheduling patient's follow-up appointment. Confirmed lab work to be done the same day as the MRI on 10/11 and the follow-up with Dr. Macias on 10/12 at 2:00. Offered we could do that follow-up visit virtually and spouse confirmed this would be best for them.     ----- Message from Netta Diez sent at 9/11/2023  3:45 PM CDT -----  Contact: pt wife  Type:  Needs Medical Advice    Who Called: pt wife  Would the patient rather a call back or a response via [facility]? call  Best Call Back Number: [phone]  Additional Information: States that they need a callback as soon as possible. States that she need to schedule an appt for the pt. Please advise thank you

## 2023-09-13 ENCOUNTER — DOCUMENT SCAN (OUTPATIENT)
Dept: HOME HEALTH SERVICES | Facility: HOSPITAL | Age: 78
End: 2023-09-13
Payer: MEDICARE

## 2023-09-20 ENCOUNTER — TELEPHONE (OUTPATIENT)
Dept: NEUROSURGERY | Facility: CLINIC | Age: 78
End: 2023-09-20
Payer: MEDICARE

## 2023-09-20 NOTE — TELEPHONE ENCOUNTER
----- Message from Sonia Lamb MA sent at 9/19/2023  3:37 PM CDT -----  Regarding: FW: Home Health  Contact: Pt @[phone]    ----- Message -----  From: Genesis Srivastava  Sent: 9/19/2023   1:51 PM CDT  To: [facility] Staff  Subject: Home Health                                      Mary Lou calling from Home Health states pt is requesting discharge, and she will no longer see the pt after today. Thanks

## 2023-09-21 ENCOUNTER — TELEPHONE (OUTPATIENT)
Dept: NEUROLOGY | Facility: CLINIC | Age: 78
End: 2023-09-21
Payer: MEDICARE

## 2023-09-21 NOTE — TELEPHONE ENCOUNTER
----- Message from Aileen Ortiz sent at 9/21/2023 10:46 AM CDT -----  Regarding: pt advice  Contact: [phone]  Pt returning call from staff. Pls call to discuss.

## 2023-09-22 ENCOUNTER — DOCUMENT SCAN (OUTPATIENT)
Dept: HOME HEALTH SERVICES | Facility: HOSPITAL | Age: 78
End: 2023-09-22
Payer: MEDICARE

## 2023-09-27 ENCOUNTER — DOCUMENT SCAN (OUTPATIENT)
Dept: HOME HEALTH SERVICES | Facility: HOSPITAL | Age: 78
End: 2023-09-27
Payer: MEDICARE

## 2023-09-28 ENCOUNTER — TELEPHONE (OUTPATIENT)
Dept: NEUROSURGERY | Facility: CLINIC | Age: 78
End: 2023-09-28
Payer: MEDICARE

## 2023-09-28 NOTE — TELEPHONE ENCOUNTER
----- Message from Caren Murdock sent at 9/28/2023 12:33 PM CDT -----  Regarding: pt advice  Contact: pt wife Uyen [phone]  PATIENT CALL    Pt's wife Uyen  is calling to speak with someone in provider's office regarding current issues. Wouldn't elaborate, asking to speak w/ Sofia. Please call her at [phone]

## 2023-10-03 ENCOUNTER — TELEPHONE (OUTPATIENT)
Dept: NEUROLOGY | Facility: CLINIC | Age: 78
End: 2023-10-03
Payer: MEDICARE

## 2023-10-03 ENCOUNTER — DOCUMENTATION ONLY (OUTPATIENT)
Dept: HEMATOLOGY/ONCOLOGY | Facility: CLINIC | Age: 78
End: 2023-10-03
Payer: MEDICARE

## 2023-10-03 NOTE — PROGRESS NOTES
Received referral from the clinic about bill that they received. Attempted to contact the wife but there was no answer. I left a message with my direct contact information.

## 2023-10-03 NOTE — TELEPHONE ENCOUNTER
Called and spoke to patient's spouse in regards to Dr. Macias's message in terms of their questions of receiving the COVID vaccine. Stated per Dr. Macias, that she said he could get the vaccine and that she recommended getting it closer to the start of his chemo cycle. Stated this is when he would benefit most from the vaccine. Also confirmed times and dates for patient's upcoming appointments. Patient's spouse confirmed.

## 2023-10-11 ENCOUNTER — HOSPITAL ENCOUNTER (OUTPATIENT)
Dept: RADIOLOGY | Facility: HOSPITAL | Age: 78
Discharge: HOME | End: 2023-10-11
Attending: NEUROLOGICAL SURGERY
Payer: MEDICARE

## 2023-10-11 ENCOUNTER — TELEPHONE (OUTPATIENT)
Dept: NEUROSURGERY | Facility: CLINIC | Age: 78
End: 2023-10-11
Payer: MEDICARE

## 2023-10-11 ENCOUNTER — OFFICE VISIT (OUTPATIENT)
Dept: NEUROSURGERY | Facility: CLINIC | Age: 78
End: 2023-10-11
Payer: MEDICARE

## 2023-10-11 VITALS
WEIGHT: 162.94 LBS | SYSTOLIC BLOOD PRESSURE: 142 MMHG | RESPIRATION RATE: 18 BRPM | BODY MASS INDEX: 22.07 KG/M2 | DIASTOLIC BLOOD PRESSURE: 86 MMHG | HEIGHT: 72 IN

## 2023-10-11 DIAGNOSIS — C71.9 GLIOBLASTOMA MULTIFORME OF BRAIN: Primary | ICD-10-CM

## 2023-10-11 DIAGNOSIS — C71.9 GLIOBLASTOMA MULTIFORME OF BRAIN: ICD-10-CM

## 2023-10-11 PROCEDURE — 3077F SYST BP >= 140 MM HG: CPT | Mod: CPTII,S$GLB,, | Performed by: NEUROLOGICAL SURGERY

## 2023-10-11 PROCEDURE — 25500020 PHARM REV CODE 255: Mod: PO | Performed by: NEUROLOGICAL SURGERY

## 2023-10-11 PROCEDURE — 3288F PR FALLS RISK ASSESSMENT DOCUMENTED: ICD-10-PCS | Mod: CPTII,S$GLB,, | Performed by: NEUROLOGICAL SURGERY

## 2023-10-11 PROCEDURE — 3288F FALL RISK ASSESSMENT DOCD: CPT | Mod: CPTII,S$GLB,, | Performed by: NEUROLOGICAL SURGERY

## 2023-10-11 PROCEDURE — 3077F PR MOST RECENT SYSTOLIC BLOOD PRESSURE >= 140 MM HG: ICD-10-PCS | Mod: CPTII,S$GLB,, | Performed by: NEUROLOGICAL SURGERY

## 2023-10-11 PROCEDURE — 99215 PR OFFICE/OUTPT VISIT, EST, LEVL V, 40-54 MIN: ICD-10-PCS | Mod: S$GLB,,, | Performed by: NEUROLOGICAL SURGERY

## 2023-10-11 PROCEDURE — 70553 MRI BRAIN STEM W/O & W/DYE: CPT | Mod: TC,PO

## 2023-10-11 PROCEDURE — 3079F DIAST BP 80-89 MM HG: CPT | Mod: CPTII,S$GLB,, | Performed by: NEUROLOGICAL SURGERY

## 2023-10-11 PROCEDURE — 99215 OFFICE O/P EST HI 40 MIN: CPT | Mod: S$GLB,,, | Performed by: NEUROLOGICAL SURGERY

## 2023-10-11 PROCEDURE — 1159F MED LIST DOCD IN RCRD: CPT | Mod: CPTII,S$GLB,, | Performed by: NEUROLOGICAL SURGERY

## 2023-10-11 PROCEDURE — 1159F PR MEDICATION LIST DOCUMENTED IN MEDICAL RECORD: ICD-10-PCS | Mod: CPTII,S$GLB,, | Performed by: NEUROLOGICAL SURGERY

## 2023-10-11 PROCEDURE — 1100F PTFALLS ASSESS-DOCD GE2>/YR: CPT | Mod: CPTII,S$GLB,, | Performed by: NEUROLOGICAL SURGERY

## 2023-10-11 PROCEDURE — 70553 MRI BRAIN W WO CONTRAST: ICD-10-PCS | Mod: 26,,, | Performed by: RADIOLOGY

## 2023-10-11 PROCEDURE — 1126F AMNT PAIN NOTED NONE PRSNT: CPT | Mod: CPTII,S$GLB,, | Performed by: NEUROLOGICAL SURGERY

## 2023-10-11 PROCEDURE — 1100F PR PT FALLS ASSESS DOC 2+ FALLS/FALL W/INJURY/YR: ICD-10-PCS | Mod: CPTII,S$GLB,, | Performed by: NEUROLOGICAL SURGERY

## 2023-10-11 PROCEDURE — A9585 GADOBUTROL INJECTION: HCPCS | Mod: PO | Performed by: NEUROLOGICAL SURGERY

## 2023-10-11 PROCEDURE — 1126F PR PAIN SEVERITY QUANTIFIED, NO PAIN PRESENT: ICD-10-PCS | Mod: CPTII,S$GLB,, | Performed by: NEUROLOGICAL SURGERY

## 2023-10-11 PROCEDURE — 3079F PR MOST RECENT DIASTOLIC BLOOD PRESSURE 80-89 MM HG: ICD-10-PCS | Mod: CPTII,S$GLB,, | Performed by: NEUROLOGICAL SURGERY

## 2023-10-11 PROCEDURE — 70553 MRI BRAIN STEM W/O & W/DYE: CPT | Mod: 26,,, | Performed by: RADIOLOGY

## 2023-10-11 RX ORDER — GADOBUTROL 604.72 MG/ML
7 INJECTION INTRAVENOUS
Status: COMPLETED | OUTPATIENT
Start: 2023-10-11 | End: 2023-10-11

## 2023-10-11 RX ADMIN — GADOBUTROL 7 ML: 604.72 INJECTION INTRAVENOUS at 10:10

## 2023-10-11 NOTE — PROGRESS NOTES
Neurosurgery History & Physical    Patient ID: Chacorta Gómez is a 78 y.o. male.    Chief Complaint   Patient presents with    Brain Tumor     Serious tremors.     Interval HPI 10/11/2023:  Mr. Oakes is now status post his initial round of temozolomide and fractionated radiation therapy.  He overall is doing well.  He denies new focal neurologic deficit.  Does continue to have a right upper extremity tremor which has been present since surgery.  He does report that he feels somewhat off balance and deconditioned with his walking.    He also describes significant edema in his bilateral lower extremities which has been present since before surgery.  He states that he was started on a diuretic medication by his primary care physician but this has not cure the issue.    They have an appointment with Dr. Macias tomorrow.    He returns for routine surveillance MRI of the brain with and without contrast.    HPI 8/9/2023:  Mr. Gómez is a 78-year-old male status post right frontal craniotomy and resection of glioblastoma on 06/13/2023.  He had a satellite lesion in the inferior right frontal lobe which was not resected given its remote location.  Therefore, his diagnosis is multifocal glioblastoma.     He has started Temodar therapy with Dr. Macias and radiation therapy with Dr. Lisa.       Since surgery he has been doing well with improving activity level.      Review of Systems  Denies fevers chills nausea or vomiting    Past Medical History:   Diagnosis Date    Benign localized hyperplasia of prostate with urinary obstruction and other lower urinary tract symptoms (LUTS)(600.21)     Brain lesion     CVA (cerebral vascular accident)     [facility]    Essential hypertension 07/12/2016    Glaucoma     bilat    Multinodular goiter     Seizures     Spontaneous ecchymoses     Unspecified essential hypertension      Social History     Socioeconomic History    Marital status:      Spouse name: Uyen     Number of children: 0   Tobacco Use    Smoking status: Never     Passive exposure: Never    Smokeless tobacco: Never   Substance and Sexual Activity    Alcohol use: Not Currently     Alcohol/week: 14.0 standard drinks of alcohol     Types: 7 Glasses of wine, 7 Cans of beer per week    Drug use: Never    Sexual activity: Yes     Partners: Female     Social Determinants of Health     Financial Resource Strain: Low Risk  (6/14/2023)    Overall Financial Resource Strain (CARDIA)     Difficulty of Paying Living Expenses: Not hard at all   Food Insecurity: No Food Insecurity (6/14/2023)    Hunger Vital Sign     Worried About Running Out of Food in the Last Year: Never true     Ran Out of Food in the Last Year: Never true   Transportation Needs: No Transportation Needs (6/14/2023)    PRAPARE - Transportation     Lack of Transportation (Medical): No     Lack of Transportation (Non-Medical): No   Physical Activity: Sufficiently Active (6/14/2023)    Exercise Vital Sign     Days of Exercise per Week: 7 days     Minutes of Exercise per Session: 30 min   Stress: Stress Concern Present (6/14/2023)    [employer] - Occupational Stress Questionnaire     Feeling of Stress : Rather much   Social Connections: Moderately Integrated (6/14/2023)    Social Connection and Isolation Panel [NHANES]     Frequency of Communication with Friends and Family: More than three times a week     Frequency of Social Gatherings with Friends and Family: More than three times a week     Attends [employer] Services: More than 4 times per year     Active Member of Clubs or Organizations: No     Attends Club or Organization Meetings: Never     Marital Status:    Housing Stability: Low Risk  (6/14/2023)    Housing Stability Vital Sign     Unable to Pay for Housing in the Last Year: No     Number of Places Lived in the Last Year: 1     Unstable Housing in the Last Year: No     Family History   Problem Relation Age of  Onset    Heart attack Mother     Hypertension Mother     Cancer Father         prostate     Hypertension Brother     Stroke Brother         mild      Review of patient's allergies indicates:  No Known Allergies    Current Outpatient Medications:     chlorpheniramine (CHLOR-TRIMETON) 4 mg tablet, Take 4 mg by mouth daily as needed (sneezing attacks)., Disp: , Rfl:     dorzolamide-timolol 2-0.5% (COSOPT) 22.3-6.8 mg/mL ophthalmic solution, Place 1 drop into both eyes 2 (two) times daily., Disp: , Rfl:     esomeprazole (NEXIUM) 40 MG capsule, Take 1 capsule (40 mg total) by mouth before breakfast., Disp: 90 capsule, Rfl: 1    famotidine (PEPCID) 20 MG tablet, Take 20 mg by mouth daily as needed for Heartburn., Disp: , Rfl:     hydroCHLOROthiazide (HYDRODIURIL) 12.5 MG Tab, TAKE 1 TABLET EVERY DAY, Disp: 90 tablet, Rfl: 3    lacosamide (VIMPAT) 100 mg Tab, Take 1 tablet (100 mg total) by mouth every 12 (twelve) hours. For additional seizure prevention, Disp: 60 tablet, Rfl: 0    latanoprost 0.005 % ophthalmic solution, Place 1 drop into both eyes every evening., Disp: , Rfl:     loratadine (CLARITIN) 10 mg tablet, Take 10 mg by mouth once daily., Disp: , Rfl:     multivitamin Tab, Take 1 tablet by mouth once daily., Disp: 30 tablet, Rfl: 0    atorvastatin (LIPITOR) 40 MG tablet, Take 1 tablet (40 mg total) by mouth every evening., Disp: 90 tablet, Rfl: 3    divalproex (DEPAKOTE) 500 MG TbEC, Take 2 tablets (1,000 mg total) by mouth every 12 (twelve) hours., Disp: 120 tablet, Rfl: 2    finasteride (PROSCAR) 5 mg tablet, TAKE 1 TABLET EVERY DAY (Patient taking differently: Take 5 mg by mouth once daily.), Disp: 90 tablet, Rfl: 3    folic acid (FOLVITE) 1 MG tablet, Take 1 tablet (1 mg total) by mouth once daily. (Patient not taking: Reported on 10/11/2023), Disp: 90 tablet, Rfl: 3    furosemide (LASIX) 20 MG tablet, Take 1 tablet (20 mg total) by mouth daily as needed (swelling)., Disp: 30 tablet, Rfl: 0    lisinopriL 10  MG tablet, Take 1 tablet (10 mg total) by mouth once daily. (Patient not taking: Reported on 10/11/2023), Disp: 90 tablet, Rfl: 3    mupirocin (BACTROBAN) 2 % ointment, Apply topically 2 (two) times daily. To areas of abrasion/irritation on face/around lips-nose/left ear (Patient not taking: Reported on 10/11/2023), Disp: 15 g, Rfl: 0    ondansetron (ZOFRAN) 8 MG tablet, Take 8mg (1 tab) 30-45 minutes prior to chemotherapy dose and every 8 hours as needed (Patient not taking: Reported on 10/11/2023), Disp: 30 tablet, Rfl: 3    oxyCODONE-acetaminophen (PERCOCET) 7.5-325 mg per tablet, Take 1 tablet by mouth every 6 (six) hours as needed for Pain. (Patient not taking: Reported on 9/6/2023), Disp: 28 tablet, Rfl: 0    thiamine 100 MG tablet, Take 100 mg by mouth once daily., Disp: , Rfl:   No current facility-administered medications for this visit.  Blood pressure (!) 142/86, resp. rate 18, height 6' (1.829 m), weight 73.9 kg (162 lb 14.7 oz).      Neurologic Exam  Alert and oriented x4   Strength is 5/5 in the bilateral upper and lower extremities   Resting tremor in the right upper extremity which increases with intention  Sensation is grossly intact to light touch  Wound is well healed    Imaging:  MRI of the brain with and without contrast dated 10/11/2023 is personally reviewed and discussed with the patient.  The enhancement in the resection cavity in the right frontal region has consolidated somewhat compared to MRI from 07/24/2023.  There is increased FLAIR signal change surrounding the resection cavity which may be consistent with radiation therapy but can not rule out tumor progression.  The right subfrontal lesion has developed central necrosis.  This is likely related to interval radiation therapy    Assessment/Plan:   Mr. Gómez is a 78-year-old gentleman with multifocal glioblastoma status post resection of the dominant lesion.    He is status post initial round of Temodar and radiation.    He is  doing well with the exception of right upper extremity tremor and swelling in his feet.  I recommend that he follow up with Neurology for possible treatment of tremor with medication and for optimization of his antiepileptic medication as needed.  I can make a referral to Neurology.  The patient wanted to speak with Dr. Macias to get her opinion and then go from there.    With regard to his bilateral feet swelling I recommend that he follow up with his primary care physician.    I recommend that he undergoes outpatient physical therapy for balance and gait training.      We will follow up in 2 months with a repeat MRI of the brain with and without contrast.

## 2023-10-12 ENCOUNTER — TELEPHONE (OUTPATIENT)
Dept: NEUROLOGY | Facility: CLINIC | Age: 78
End: 2023-10-12
Payer: MEDICARE

## 2023-10-12 NOTE — TELEPHONE ENCOUNTER
Called and spoke to spouse in regards to virtual appt today. Spouse stated that they are having computer problems and feels they are dealing with a hacker. Spouse stated they would have to cancel this virtual appt today. She also stated they went to appt yesterday with Dr. English and got MRI done with Dr. English. Spouse recommended Dr. Macias may want to follow up with Dr. English if that would help. Spouse stated she would give us a call back to reschedule this appt once they get their technology problems situated. Patient's appt for today was cancelled.     ----- Message from [employer] sent at 10/12/2023 11:41 AM CDT -----  Regarding: Speak with someone ASAP  Contact: Uyen [phone]  Pt's wife Uyen is calling in ref to pt's appt scheduled on today at 2. She says she would like to speak with someone as soon as possible about reason cancellation was unavoidable. Patient Requesting Call Back @ [phone]

## 2023-10-13 ENCOUNTER — OFFICE VISIT (OUTPATIENT)
Dept: RADIATION ONCOLOGY | Facility: CLINIC | Age: 78
End: 2023-10-13
Payer: MEDICARE

## 2023-10-13 ENCOUNTER — TELEPHONE (OUTPATIENT)
Dept: NEUROLOGY | Facility: CLINIC | Age: 78
End: 2023-10-13
Payer: MEDICARE

## 2023-10-13 VITALS
SYSTOLIC BLOOD PRESSURE: 120 MMHG | RESPIRATION RATE: 18 BRPM | HEART RATE: 64 BPM | BODY MASS INDEX: 22.25 KG/M2 | DIASTOLIC BLOOD PRESSURE: 65 MMHG | TEMPERATURE: 98 F | OXYGEN SATURATION: 96 % | WEIGHT: 164 LBS

## 2023-10-13 DIAGNOSIS — C71.9 GLIOBLASTOMA DETERMINED BY BIOPSY OF BRAIN: Primary | ICD-10-CM

## 2023-10-13 DIAGNOSIS — C71.9 GLIOBLASTOMA MULTIFORME OF BRAIN: ICD-10-CM

## 2023-10-13 PROCEDURE — 99214 OFFICE O/P EST MOD 30 MIN: CPT | Mod: 24,S$GLB,, | Performed by: RADIOLOGY

## 2023-10-13 PROCEDURE — 1125F PR PAIN SEVERITY QUANTIFIED, PAIN PRESENT: ICD-10-PCS | Mod: CPTII,S$GLB,, | Performed by: RADIOLOGY

## 2023-10-13 PROCEDURE — 3078F DIAST BP <80 MM HG: CPT | Mod: CPTII,S$GLB,, | Performed by: RADIOLOGY

## 2023-10-13 PROCEDURE — 1125F AMNT PAIN NOTED PAIN PRSNT: CPT | Mod: CPTII,S$GLB,, | Performed by: RADIOLOGY

## 2023-10-13 PROCEDURE — 1101F PR PT FALLS ASSESS DOC 0-1 FALLS W/OUT INJ PAST YR: ICD-10-PCS | Mod: CPTII,S$GLB,, | Performed by: RADIOLOGY

## 2023-10-13 PROCEDURE — 99214 PR OFFICE/OUTPT VISIT, EST, LEVL IV, 30-39 MIN: ICD-10-PCS | Mod: 24,S$GLB,, | Performed by: RADIOLOGY

## 2023-10-13 PROCEDURE — 3288F FALL RISK ASSESSMENT DOCD: CPT | Mod: CPTII,S$GLB,, | Performed by: RADIOLOGY

## 2023-10-13 PROCEDURE — 3078F PR MOST RECENT DIASTOLIC BLOOD PRESSURE < 80 MM HG: ICD-10-PCS | Mod: CPTII,S$GLB,, | Performed by: RADIOLOGY

## 2023-10-13 PROCEDURE — 3074F PR MOST RECENT SYSTOLIC BLOOD PRESSURE < 130 MM HG: ICD-10-PCS | Mod: CPTII,S$GLB,, | Performed by: RADIOLOGY

## 2023-10-13 PROCEDURE — 1159F PR MEDICATION LIST DOCUMENTED IN MEDICAL RECORD: ICD-10-PCS | Mod: CPTII,S$GLB,, | Performed by: RADIOLOGY

## 2023-10-13 PROCEDURE — 99999 PR PBB SHADOW E&M-EST. PATIENT-LVL V: ICD-10-PCS | Mod: PBBFAC,,, | Performed by: RADIOLOGY

## 2023-10-13 PROCEDURE — 3074F SYST BP LT 130 MM HG: CPT | Mod: CPTII,S$GLB,, | Performed by: RADIOLOGY

## 2023-10-13 PROCEDURE — 3288F PR FALLS RISK ASSESSMENT DOCUMENTED: ICD-10-PCS | Mod: CPTII,S$GLB,, | Performed by: RADIOLOGY

## 2023-10-13 PROCEDURE — 1159F MED LIST DOCD IN RCRD: CPT | Mod: CPTII,S$GLB,, | Performed by: RADIOLOGY

## 2023-10-13 PROCEDURE — 99999 PR PBB SHADOW E&M-EST. PATIENT-LVL V: CPT | Mod: PBBFAC,,, | Performed by: RADIOLOGY

## 2023-10-13 PROCEDURE — 1101F PT FALLS ASSESS-DOCD LE1/YR: CPT | Mod: CPTII,S$GLB,, | Performed by: RADIOLOGY

## 2023-10-13 NOTE — TELEPHONE ENCOUNTER
SW attempted to call both home and mobile numbers to inquire how the patient and caregiver are doing and provide any support. SW provided call back number.

## 2023-10-13 NOTE — PROGRESS NOTES
[facility] Department of Radiation Oncology  Follow Up Visit Note    Diagnosis:  Chacorta Gómez is a 78 y.o. male with a(n) multifocal right frontal GBM, s/p resection 6/13.  He completed a course of adjuvant radiation therapy to 40.05Gy with concurrent Temodar.      Oncologic History:  Oncology History   Glioblastoma determined by biopsy of brain   7/7/2023 Initial Diagnosis    Glioblastoma determined by biopsy of brain     7/7/2023 Cancer Staged    Staging form: Brain and Spinal Cord, AJCC 8th Edition  - Pathologic stage from 7/7/2023: WHO Grade IV     8/8/2023 - 8/28/2023 Radiation Therapy    Treating physician: Alexa Lisa  Total Dose: 40.05 Gy  Fractions: 15  Treatment Site Ref. ID Energy Dose/Fx (Gy) #Fx Dose Correction (Gy) Total Dose (Gy) Start Date End Date Elapsed Days   IM Brain PTV_High 6X 2.67 15 / 15 0 40.05 8/8/2023 8/28/2023 20      Glioblastoma multiforme of brain   7/11/2023 Initial Diagnosis    Glioblastoma multiforme of brain     7/11/2023 -  Chemotherapy    Treatment Summary   Plan Name: OP TEMOZOLOMIDE DAILY FOR 3 WEEKS  Treatment Goal: Control  Status: Active  Start Date: 7/11/2023  End Date: 7/11/2023  Provider: Lucita Macias MD  Chemotherapy: temozolomide (TEMODAR) capsule 145 mg, 75 mg/m2 = 145 mg, Oral, Daily, 1 of 1 cycle, Start date: 7/11/2023, End date: 8/1/2023 8/8/2023 - 8/28/2023 Radiation Therapy    Treating physician: Alexa Lisa  Total Dose: 40.05 Gy  Fractions: 15  Treatment Site Ref. ID Energy Dose/Fx (Gy) #Fx Dose Correction (Gy) Total Dose (Gy) Start Date End Date Elapsed Days   IM Brain PTV_High 6X 2.67 15 / 15 0 40.05 8/8/2023 8/28/2023 20           Interval History  The patient presents today for a regularly scheduled follow up visit.  He was last seen in our clinic on 8/28/23.   Since that time, daily HA.  Ongoing chronic BLE edema. Stable tremor, greater in right hand arm. Ongoing issues with balance. Several falls. Episodes  of confusion.  Has Home Health. Daytime sleepiness on going. Itchy scalp    10/11/23 MRI Brain: minimal change in size of the enhancing lesion in the inf right frontal lobe, now centrally necrotic; increased surrounding edema. Persistent nodular enhancement along periphery of right frontal resection cavity, more defined than previously    Seen in follow up 10/11/23 by Dr. English. Missed appointment yesterday with Dr. Macias 2/2 computer issues; hasn't started adjuvant Temodar.     HPI: The patient is a 77 year old male with history of HTN, glaucoma, who presented with seizures. Brain mass was initially diagnosed several months ago at [place] during work up for seizure vs stroke showing a mass, but he subsequently went on a trip to [facility]. Started on Depakote and steroids. Had recurrent seizure in [place].      He was BIBF to ED for progressive neuro symptoms including confusion/AMS (got lost driving, slept in car in the woods, found by police search).      6/5/23 MRI Brain: 4.2 enhancing mass medial right frontal; second inferior right frontal lobe lesion, subtle nonspecific asymmetric cortical thickening and T2 FLAIR in left inferior frontal and insular cortex.     6/13/23 Right frontal resection: WHO Grade IV GBM. IDH 1/2 wt, +MGMT methylation.     Doing well since surgery; notes bilateral foot swelling since prior to surgery (possibly 2/2 steroids). Unsteady on feet, generalized weakness, significant daytime sleepiness (possibly 2/2 Depakote or Vimpat)    Review of Systems   Review of Systems   Constitutional:  Positive for weight loss.   Eyes:  Negative for blurred vision and double vision.   Gastrointestinal:  Negative for nausea and vomiting.   Musculoskeletal:  Positive for falls.   Skin:  Positive for itching (scalp).   Neurological:  Positive for tremors (BUE, R>L) and headaches. Negative for weakness.       Social History:  Social History     Tobacco Use    Smoking status: Never     Passive exposure: Never     Smokeless tobacco: Never   Substance Use Topics    Alcohol use: Not Currently     Alcohol/week: 14.0 standard drinks of alcohol     Types: 7 Glasses of wine, 7 Cans of beer per week    Drug use: Never       Family History:  Cancer-related family history includes Cancer in his father.    Exam:  Vitals:    10/13/23 1305   BP: 120/65   Pulse: 64   Resp: 18   Temp: 98.2 °F (36.8 °C)   SpO2: 96%   Weight: 74.4 kg (164 lb 0.4 oz)     Constitutional: Pleasant 78 y.o. male in no acute distress.  Frail appearing. Well groomed.   HEENT: Normocephalic and atraumatic   Lungs: No audible wheezing.  Normal effort.   Musculoskeletal: No gross MSK deformities. Ambulates with cane; +BLE edema  Skin: No rashes appreciated.   Psych: Alert and oriented with appropriate mood and affect.  Neuro: right upper extremity resting tremor;   CN II-XII intact.  Delayed responses.  .    Data Review:    Independent Interpretation of Test(s): MRI from 10/11/23 was personally reviewed as detailed above      Assessment:  Chacorta Gómez is a 78 y.o. male with a(n) multifocal right frontal GBM, s/p resection 6/13.  He completed a course of adjuvant radiation therapy to 40.05Gy with concurrent Temodar.  ECOG: (4) Completely disabled, unable to carry out self-care and confined to bed or chair    Plan:  Follow up in 3 months  Reschedule appt with Dr. Macias for discussion of adjuvant Temodar  MRI brain in 2 months (ordered by Dr. English)  He was given our contact information, and he was told that he could call our clinic at anytime if he has any questions or concerns.  Follow up with other providers as directed

## 2023-10-16 NOTE — TELEPHONE ENCOUNTER
SW called patient's wife to provide support and assess care needs for patient and caregiver.    Patient's wife states she has a few concerns she wanted to bring up during phone call. She informed SW that they had to cancel an appointment made last week due to computer issues.    The patient's phone began ringing during call, and patient's wife left to give it to patient. SW waited on line for several minutes before being disconnected.

## 2023-10-16 NOTE — TELEPHONE ENCOUNTER
"Patient's wife apologized for previous call breaking up. Wife reports feeling of significant overwhelm, even expressing some suicidal ideations. SW conducted suicidal ideation assessment- patient's wife does not have a plan, but states that things have been "too much to handle." SW emphasized the importance of getting help when feeling depressed and isolated. SW requested that patient's wife contact her if she has worsening thoughts of depression or suicidal ideation.    Wife states that she is not at the place to harm herself. SW and patient's wife discussed strengths and sources of support that wife can rely on. Patient's wife says she has been able to find support with her neighbors. She states that simply venting about what she's going through has been really helpful. She states that she gets frustrated with patient's changing functionality, as she still holds him to the standards of his previous functioning. Wife reports that the pt hands shaking is getting worse, that hhe is getting forgetful and confused. She reports that he doesn't perform the exercises recommended for him. She feels that his motivation has depleted, and she would like him to "fight this until the end."    SW and patient's wife discussed managing expectations to ensure that wife reduces burnout and disappointment, while still engaging and assisting her .    Wife states that the patient may benefit from some counseling from a male therapist. She reports that he doesn't talk about his feelings or experiences with her. SW recommended that wife consider looking through [employer] to find a therapist that takes their insurance. Wife voiced interest.    Family is going through financial stressors, their bank notified them that someone was trying to break into their account.   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 Since his disease, he has been unable to complete these tasks, which has had an impact " on their income. ERIN would like to connect patient and family with [facility] financial assistance. ERIN will send information to .    Patient's wife voiced interest in discussing more strategies to manage her own frustrations in caring for her partner. SW provided contact number and encouraged her to reach out when she needs support or gets overwhelmed.     SW will relay wife's request to reschedule appt to neuro team.

## 2023-10-17 ENCOUNTER — TELEPHONE (OUTPATIENT)
Dept: NEUROLOGY | Facility: CLINIC | Age: 78
End: 2023-10-17
Payer: MEDICARE

## 2023-10-17 NOTE — TELEPHONE ENCOUNTER
Called patient's spouse and LVM to reschedule patient appt with Dr. Macias. Spouse did not answer. Left message and provided patient call back number.

## 2023-10-26 ENCOUNTER — OFFICE VISIT (OUTPATIENT)
Dept: NEUROLOGY | Facility: CLINIC | Age: 78
End: 2023-10-26
Payer: MEDICARE

## 2023-10-26 VITALS — DIASTOLIC BLOOD PRESSURE: 74 MMHG | HEART RATE: 62 BPM | SYSTOLIC BLOOD PRESSURE: 110 MMHG

## 2023-10-26 DIAGNOSIS — G20.C PARKINSONISM, UNSPECIFIED PARKINSONISM TYPE: ICD-10-CM

## 2023-10-26 DIAGNOSIS — R25.1 TREMOR: ICD-10-CM

## 2023-10-26 DIAGNOSIS — C71.9 GLIOBLASTOMA DETERMINED BY BIOPSY OF BRAIN: Primary | ICD-10-CM

## 2023-10-26 DIAGNOSIS — R56.9 SEIZURE: ICD-10-CM

## 2023-10-26 PROCEDURE — 99215 PR OFFICE/OUTPT VISIT, EST, LEVL V, 40-54 MIN: ICD-10-PCS | Mod: S$GLB,,, | Performed by: PSYCHIATRY & NEUROLOGY

## 2023-10-26 PROCEDURE — 3078F DIAST BP <80 MM HG: CPT | Mod: CPTII,S$GLB,, | Performed by: PSYCHIATRY & NEUROLOGY

## 2023-10-26 PROCEDURE — 1159F MED LIST DOCD IN RCRD: CPT | Mod: CPTII,S$GLB,, | Performed by: PSYCHIATRY & NEUROLOGY

## 2023-10-26 PROCEDURE — 99999 PR PBB SHADOW E&M-EST. PATIENT-LVL III: ICD-10-PCS | Mod: PBBFAC,,, | Performed by: PSYCHIATRY & NEUROLOGY

## 2023-10-26 PROCEDURE — 1100F PTFALLS ASSESS-DOCD GE2>/YR: CPT | Mod: CPTII,S$GLB,, | Performed by: PSYCHIATRY & NEUROLOGY

## 2023-10-26 PROCEDURE — 1100F PR PT FALLS ASSESS DOC 2+ FALLS/FALL W/INJURY/YR: ICD-10-PCS | Mod: CPTII,S$GLB,, | Performed by: PSYCHIATRY & NEUROLOGY

## 2023-10-26 PROCEDURE — 1125F AMNT PAIN NOTED PAIN PRSNT: CPT | Mod: CPTII,S$GLB,, | Performed by: PSYCHIATRY & NEUROLOGY

## 2023-10-26 PROCEDURE — 1159F PR MEDICATION LIST DOCUMENTED IN MEDICAL RECORD: ICD-10-PCS | Mod: CPTII,S$GLB,, | Performed by: PSYCHIATRY & NEUROLOGY

## 2023-10-26 PROCEDURE — 99999 PR PBB SHADOW E&M-EST. PATIENT-LVL III: CPT | Mod: PBBFAC,,, | Performed by: PSYCHIATRY & NEUROLOGY

## 2023-10-26 PROCEDURE — 3074F SYST BP LT 130 MM HG: CPT | Mod: CPTII,S$GLB,, | Performed by: PSYCHIATRY & NEUROLOGY

## 2023-10-26 PROCEDURE — 3288F PR FALLS RISK ASSESSMENT DOCUMENTED: ICD-10-PCS | Mod: CPTII,S$GLB,, | Performed by: PSYCHIATRY & NEUROLOGY

## 2023-10-26 PROCEDURE — 3078F PR MOST RECENT DIASTOLIC BLOOD PRESSURE < 80 MM HG: ICD-10-PCS | Mod: CPTII,S$GLB,, | Performed by: PSYCHIATRY & NEUROLOGY

## 2023-10-26 PROCEDURE — 3074F PR MOST RECENT SYSTOLIC BLOOD PRESSURE < 130 MM HG: ICD-10-PCS | Mod: CPTII,S$GLB,, | Performed by: PSYCHIATRY & NEUROLOGY

## 2023-10-26 PROCEDURE — 1125F PR PAIN SEVERITY QUANTIFIED, PAIN PRESENT: ICD-10-PCS | Mod: CPTII,S$GLB,, | Performed by: PSYCHIATRY & NEUROLOGY

## 2023-10-26 PROCEDURE — 3288F FALL RISK ASSESSMENT DOCD: CPT | Mod: CPTII,S$GLB,, | Performed by: PSYCHIATRY & NEUROLOGY

## 2023-10-26 PROCEDURE — 99215 OFFICE O/P EST HI 40 MIN: CPT | Mod: S$GLB,,, | Performed by: PSYCHIATRY & NEUROLOGY

## 2023-10-26 RX ORDER — CARBIDOPA AND LEVODOPA 25; 100 MG/1; MG/1
1 TABLET ORAL 3 TIMES DAILY
Qty: 90 TABLET | Refills: 11 | Status: SHIPPED | OUTPATIENT
Start: 2023-10-26 | End: 2024-10-25

## 2023-10-26 NOTE — PROGRESS NOTES
Subjective:       Patient ID: Chacorta Gómez is a 78 y.o. male.    Chief Complaint: glioblastoma    HPI  Oncologic History:   2/2023: admitted to outside hospital for new onset seizure, he was started on keppra. MRI brain was done and was reportedly suspicious for subacute infarct.   4-5/2023: was in [place] for a work trip, had recurrent seizures, was hospitalized there for 10 days.   6/2023: hospitalized for breakthrough seizures, lacosamide was added, underwent repeat MRI brain which showed 4.2 x 2.6 x 4.1 cm heterogeneous enhancing hemorrhagic medial right frontal lesion. A 2nd lesion along the planum sphenoidale has a dural base but appears to also involve the inferior frontal lobe parenchyma. Patient was seen by neurosurgery and underwent subtotal resection. Pathology consistent with GBM.   8/8-28/2023: chemoradiation to 40Gy over 15fx (Alexa Lisa)    Chacorta is feeling decently recovered from chemoradiation. He has persistent right>left hand tremor that impedes his art. He and his wife feel the tremor started after his craniotomy.    Past Medical History:   Diagnosis Date    Benign localized hyperplasia of prostate with urinary obstruction and other lower urinary tract symptoms (LUTS)(600.21)     Brain lesion     CVA (cerebral vascular accident)     [facility]    Essential hypertension 07/12/2016    Glaucoma     bilat    Multinodular goiter     Seizures     Spontaneous ecchymoses     Unspecified essential hypertension       Current Outpatient Medications   Medication Sig Dispense Refill    atorvastatin (LIPITOR) 40 MG tablet Take 1 tablet (40 mg total) by mouth every evening. 90 tablet 3    carbidopa-levodopa  mg (SINEMET)  mg per tablet Take 1 tablet by mouth 3 (three) times daily. 90 tablet 11    chlorpheniramine (CHLOR-TRIMETON) 4 mg tablet Take 4 mg by mouth daily as needed (sneezing attacks).      divalproex (DEPAKOTE) 500 MG TbEC Take 2 tablets (1,000 mg total) by mouth every 12  (twelve) hours. 120 tablet 2    dorzolamide-timolol 2-0.5% (COSOPT) 22.3-6.8 mg/mL ophthalmic solution Place 1 drop into both eyes 2 (two) times daily.      famotidine (PEPCID) 20 MG tablet Take 20 mg by mouth daily as needed for Heartburn.      finasteride (PROSCAR) 5 mg tablet TAKE 1 TABLET EVERY DAY (Patient taking differently: Take 5 mg by mouth once daily.) 90 tablet 3    folic acid (FOLVITE) 1 MG tablet Take 1 tablet (1 mg total) by mouth once daily. (Patient not taking: Reported on 10/11/2023) 90 tablet 3    furosemide (LASIX) 20 MG tablet Take 1 tablet (20 mg total) by mouth daily as needed (swelling). 30 tablet 0    hydroCHLOROthiazide (HYDRODIURIL) 12.5 MG Tab TAKE 1 TABLET EVERY DAY 90 tablet 3    lacosamide (VIMPAT) 100 mg Tab Take 1 tablet (100 mg total) by mouth every 12 (twelve) hours. For additional seizure prevention 60 tablet 0    latanoprost 0.005 % ophthalmic solution Place 1 drop into both eyes every evening.      lisinopriL 10 MG tablet Take 1 tablet (10 mg total) by mouth once daily. (Patient not taking: Reported on 10/11/2023) 90 tablet 3    loratadine (CLARITIN) 10 mg tablet Take 10 mg by mouth once daily.      multivitamin Tab Take 1 tablet by mouth once daily. 30 tablet 0    mupirocin (BACTROBAN) 2 % ointment Apply topically 2 (two) times daily. To areas of abrasion/irritation on face/around lips-nose/left ear (Patient not taking: Reported on 10/11/2023) 15 g 0    ondansetron (ZOFRAN) 8 MG tablet Take 8mg (1 tab) 30-45 minutes prior to chemotherapy dose and every 8 hours as needed (Patient not taking: Reported on 10/11/2023) 30 tablet 3    oxyCODONE-acetaminophen (PERCOCET) 7.5-325 mg per tablet Take 1 tablet by mouth every 6 (six) hours as needed for Pain. (Patient not taking: Reported on 9/6/2023) 28 tablet 0    temozolomide (TEMODAR) 20 MG capsule Take 2 capsules (40 mg total) by mouth once daily For days 1-5/28 day cycle with 1 other temozolomide prescription for 290 mg total. 10  capsule 0    temozolomide (TEMODAR) 250 MG capsule Take 1 capsule (250 mg total) by mouth once daily For days 1-5/28 day cycle with 1 other temozolomide prescription for 290 mg total. 5 capsule 0    thiamine 100 MG tablet Take 100 mg by mouth once daily.       No current facility-administered medications for this visit.      Past Surgical History:   Procedure Laterality Date    COLONOSCOPY  08/03/2009    Dr. Askew    CRANIOTOMY FOR EXCISION OF INTRACRANIAL TUMOR      CRANIOTOMY, WITH NEOPLASM EXCISION USING COMPUTER-ASSISTED NAVIGATION Right 6/13/2023    Procedure: CRANIOTOMY, WITH NEOPLASM EXCISION USING COMPUTER-ASSISTED NAVIGATION -     RIGHT FRONTAL;  Surgeon: Joao English MD;  Location: [facility];  Service: Neurosurgery;  Laterality: Right;    NASAL SEPTUM SURGERY      TONSILLECTOMY        Family History   Problem Relation Age of Onset    Heart attack Mother     Hypertension Mother     Cancer Father         prostate     Hypertension Brother     Stroke Brother         mild       Social History     Tobacco Use    Smoking status: Never     Passive exposure: Never    Smokeless tobacco: Never   Substance Use Topics    Alcohol use: Not Currently     Alcohol/week: 14.0 standard drinks of alcohol     Types: 7 Glasses of wine, 7 Cans of beer per week    Drug use: Never      Review of Systems  KPS: 80        Objective:      Vitals:    10/26/23 1354   BP: 110/74   Pulse: 62          NEUROLOGICAL EXAMINATION:     MENTAL STATUS   Attention: normal. Concentration: normal.   Speech: speech is normal   Level of consciousness: alert    CRANIAL NERVES   Cranial nerves II through XII intact.     MOTOR EXAM   Muscle bulk: normal  Right arm tone: increased    Strength   Strength 5/5 throughout.        Rigidity and bradykinesia R>L  Medium-frequency, low to medium-amplitude tremor of the right hand>left, more at rest than with posture or action     SENSORY EXAM   Light touch normal.     GAIT AND COORDINATION      Coordination    Finger to nose coordination: normal       Stooped posture with mild shuffling. En bloc, multistep turn.        MRI brain from 10/11/2023 was personally visualized and compared to prior.  1. There has been minimal change in size of the enhancing lesion in the inferior right frontal lobe but the lesion is now centrally necrotic.  Additionally, the amount of surrounding vasogenic edema has increased.  FLAIR hyperintense signal extend cephalad to the high right frontal resection region.  Some of these changes could certainly represent treatment change.  Interval progression of disease, particularly involving the inferior right frontal lobe lesion, cannot be excluded.  2. In the high medial right frontal resection cavity, there is persistent nodular enhancement along the periphery.  On the most recent prior study this enhancement was somewhat ill-defined and appears more defined and contracted on the current study.  Continued surveillance is warranted.    Assessment:       Problem List Items Addressed This Visit          Neuro    Parkinsonism, unspecified Parkinsonism type       Oncology    Glioblastoma determined by biopsy of brain - Primary    Relevant Medications    temozolomide (TEMODAR) 20 MG capsule    temozolomide (TEMODAR) 250 MG capsule    Other Relevant Orders    CBC Auto Differential    Comprehensive Metabolic Panel     Other Visit Diagnoses       Tremor        Relevant Medications    carbidopa-levodopa  mg (SINEMET)  mg per tablet    Seizure                  Plan:       Chacorta Gómez is a 78 y.o. male with a glioblastoma, MGMT-methylated, IDH-wt, WHO grade 4 s/p subtotal resection and chemoradiation to 40Gy over 15fx presenting for treatment recommendations. Tumor diagnosis was heralded by seizure.     Glioblastoma  MRI brain without evidence of recorrence. Laboratory evaluation is adequate for initiation of adjuvant TMZ @150mg/m2 days 1-5/28. F/u in 4 weeks with CBC/CMP in anticipation of C2  TMZ.    Tremor/parkinsonism  Pill rolling tremor, bradykinesia, and rigidity R>L along with stooped posture and shuffling consistent with parkinsonism. It is odd that he and wife feel this started suddenly after his surgery as he is at least a H&Y stage 2. Trial Sinemet 25-100mg tid.    Seizures  Continue levetiracetam 1g bid and lacosamide 100mg bid                  Lucita Macias MD  Department of Neurology  Neuro-Oncology, Movement Disorders

## 2023-10-27 PROBLEM — G20.C PARKINSONISM, UNSPECIFIED PARKINSONISM TYPE: Status: ACTIVE | Noted: 2023-10-27

## 2023-10-27 RX ORDER — TEMOZOLOMIDE 250 MG/1
250 CAPSULE ORAL DAILY
Qty: 5 CAPSULE | Refills: 0 | Status: ACTIVE | OUTPATIENT
Start: 2023-10-27 | End: 2023-11-29

## 2023-10-27 RX ORDER — TEMOZOLOMIDE 20 MG/1
40 CAPSULE ORAL DAILY
Qty: 10 CAPSULE | Refills: 0 | Status: ACTIVE | OUTPATIENT
Start: 2023-10-27 | End: 2023-11-29

## 2023-11-03 ENCOUNTER — TELEPHONE (OUTPATIENT)
Dept: NEUROSURGERY | Facility: CLINIC | Age: 78
End: 2023-11-03
Payer: MEDICARE

## 2023-11-03 NOTE — TELEPHONE ENCOUNTER
----- Message from Sofia Young RN sent at 11/3/2023 12:34 PM CDT -----  Regarding: FW: Medication Question  Contact: pt. [phone]    ----- Message -----  From: Shaw Nix  Sent: 11/3/2023  12:24 PM CDT  To: Gilberto Landrum Staff  Subject: Medication Question                              Pt is calling to speak with Sofia  in ref to new chemo medication he received. Pt says he needs some clarification. Patient Requesting Call Back @ [phone]

## 2023-11-06 DIAGNOSIS — G40.909 SEIZURE DISORDER: ICD-10-CM

## 2023-11-06 NOTE — TELEPHONE ENCOUNTER
----- Message from Tamar Morgan sent at 11/6/2023 10:35 AM CST -----  Pt calling in regards to receiving his medication  for chemo , does he need to start it now     Confirmed patient's contact info below:  Contact Name: Chacorta Gómez  Phone Number: [phone]

## 2023-11-06 NOTE — NURSING
Pt called to seek advisement on when to begin chemo. Instructed to begin tonight.     Requesting refill of AED medication. Routed to Dr. Macias for approval.

## 2023-11-07 RX ORDER — DIVALPROEX SODIUM 500 MG/1
1000 TABLET, DELAYED RELEASE ORAL EVERY 12 HOURS
Qty: 120 TABLET | Refills: 2 | Status: SHIPPED | OUTPATIENT
Start: 2023-11-07 | End: 2024-02-21

## 2023-11-08 NOTE — PROGRESS NOTES
PSYCHO-ONCOLOGY INTAKE    Diagnostic Interview - CPT 99726    Date: 11/9/2023  Site: [place], [place]    Evaluation Length (direct face-to-face time):  45 minutes    This includes face to face time and non-face to face time preparing to see the patient, obtaining and/or reviewing separately obtained history, documenting clinical information in the electronic or other health record, independently interpreting results and communicating results to the patient/family/caregiver, or care coordinator.     Referral Source: Alexa Lisa MD   Oncologist: Alexa Lisa MD   PCP: Andrei Dupont MD    Clinical status of patient: Outpatient    Chacorta Gómez, a 78 y.o. male, seen for initial evaluation visit. His wife was present for the session with the consent of Chacorta R Dalia   Chacorta Gómez reviewed and agreed to informed consent and the limits of confidentiality.    Chief complaint/reason for encounter: adjustment to illness    Medical/Surgical History:    Patient Active Problem List   Diagnosis    Essential hypertension    Family history of coronary artery disease    Aortic atherosclerosis    Esophageal hiatus hernia    Fatty liver    Benign localized prostatic hyperplasia with lower urinary tract symptoms (LUTS)    Family history of osteoporosis    Osteoarthritis of lumbar spine    Glaucoma    Seizure disorder    Brain lesion    Thrombocytopenia    Alcohol abuse    Syncope    Vasogenic brain edema    Congestion of nasal sinus    Laceration of left ear    Bradycardia    Hyponatremia    Hyperglycemia    Glioblastoma determined by biopsy of brain    Glioblastoma multiforme of brain    Parkinsonism, unspecified Parkinsonism type       Health Behaviors:       ETOH Use: No      Tobacco Use: No   Illicit Drug Use:  No     Prescription Misuse:No   Caffeine: 2-3 cups/day   Exercise:The patient engages in environmental activity only. The patient is actively working to return to baseline exercise  tolerance.   Firearms:  Yes, in a separate room unloaded   Advanced directives:No- he was provided with this paperwork.     Family History:   Psychiatric illness: No     Alcohol/Drug Abuse: Yes, his father was an alcoholic    Suicide: No      Past Psychiatric History:   Inpatient treatment: No     Outpatient treatment: Yes, marriage counseling years ago     Prior substance abuse treatment: No     Suicide Attempts: No     Psychotropic Medications:  Current: none       Past: none    Current medications as per below, allergies reviewed in chart.    Current Outpatient Medications   Medication    atorvastatin (LIPITOR) 40 MG tablet    carbidopa-levodopa  mg (SINEMET)  mg per tablet    chlorpheniramine (CHLOR-TRIMETON) 4 mg tablet    divalproex (DEPAKOTE) 500 MG TbEC    dorzolamide-timolol 2-0.5% (COSOPT) 22.3-6.8 mg/mL ophthalmic solution    famotidine (PEPCID) 20 MG tablet    finasteride (PROSCAR) 5 mg tablet    folic acid (FOLVITE) 1 MG tablet    furosemide (LASIX) 20 MG tablet    hydroCHLOROthiazide (HYDRODIURIL) 12.5 MG Tab    lacosamide (VIMPAT) 100 mg Tab    latanoprost 0.005 % ophthalmic solution    lisinopriL 10 MG tablet    loratadine (CLARITIN) 10 mg tablet    multivitamin Tab    mupirocin (BACTROBAN) 2 % ointment    ondansetron (ZOFRAN) 8 MG tablet    oxyCODONE-acetaminophen (PERCOCET) 7.5-325 mg per tablet    temozolomide (TEMODAR) 20 MG capsule    temozolomide (TEMODAR) 250 MG capsule    thiamine 100 MG tablet     No current facility-administered medications for this visit.              Social situation/Stressors: Chacorta Gómez lives with with his wife in [place].  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    Chacorta Gómez has been  52 and have no children.  His partner is Esperanza.  The patient reports good social support.  Chacorta Gómez is spiritual, but not Religion.  Chacorta Gómez's hobbies include painting.  Additional stressors: hand tremor reduces painting  ability which reduces finances (no income)    Strengths:Interpersonal relationships and supports available - family, relatives, friends  Liabilities: Financial strain    Current Evaluation:     Mental Status Exam: Chacorta Gómez arrived promptly for the assessment session.  The patient was fully cooperative throughout the interview and was an adequate historian   Appearance: age appropriate, appropriately  dressed, adequately  groomed  Behavior/Cooperation: friendly and cooperative  Speech: normal in rate, volume, and tone  Mood: euthymic  Affect: mood congruent  Thought Process: goal-directed, logical  Thought Content: normal, no suicidality, no homicidality, delusions, or paranoia;did not appear to be responding to internal stimuli during the interview.   Orientation: grossly intact  Memory: grossly intact  Attention Span/Concentration: Attends to interview without distraction; reports subjective difficulty  Fund of Knowledge: average  Estimate of Intelligence: average from verbal skills and history  Cognition: grossly intact  Insight: patient has awareness of illness; good insight into own behavior and behavior of others  Judgment: the patient's behavior is adequate to circumstances      History of present illness:    Oncology History   Glioblastoma determined by biopsy of brain   7/7/2023 Initial Diagnosis    Glioblastoma determined by biopsy of brain     7/7/2023 Cancer Staged    Staging form: Brain and Spinal Cord, AJCC 8th Edition  - Pathologic stage from 7/7/2023: WHO Grade IV     8/8/2023 - 8/28/2023 Radiation Therapy    Treating physician: Alexa Lisa  Total Dose: 40.05 Gy  Fractions: 15  Treatment Site Ref. ID Energy Dose/Fx (Gy) #Fx Dose Correction (Gy) Total Dose (Gy) Start Date End Date Elapsed Days   IM Brain PTV_High 6X 2.67 15 / 15 0 40.05 8/8/2023 8/28/2023 20      Glioblastoma multiforme of brain   7/11/2023 Initial Diagnosis    Glioblastoma multiforme of brain     7/11/2023 -   Chemotherapy    Treatment Summary   Plan Name: OP TEMOZOLOMIDE DAILY FOR 3 WEEKS  Treatment Goal: Control  Status: Active  Start Date: 7/11/2023  End Date: 7/11/2023  Provider: Lucita Macias MD  Chemotherapy: temozolomide (TEMODAR) capsule 145 mg, 75 mg/m2 = 145 mg, Oral, Daily, 1 of 1 cycle, Start date: 7/11/2023, End date: 8/1/2023 8/8/2023 - 8/28/2023 Radiation Therapy    Treating physician: Alexa Lisa  Total Dose: 40.05 Gy  Fractions: 15  Treatment Site Ref. ID Energy Dose/Fx (Gy) #Fx Dose Correction (Gy) Total Dose (Gy) Start Date End Date Elapsed Days   IM Brain PTV_High 6X 2.67 15 / 15 0 40.05 8/8/2023 8/28/2023 20            Chacorta Gómez has adjusted to illness well primarily through active coping strategies. He has engaged in appropriate information gathering.  The patient has good family/friend support.  His support system is coping poorly with the diagnosis/treatment/prognosis. Illness-related psychosocial stressors include financial strain .  The patient has a good partnership with his [facility] treatment team. The patient reports the following barriers to cancer care:financial limitations and insurance coverage. He was unsure why he was required to have this appointment.     NCCN Distress thermometer:       10/13/2023     1:10 PM 8/4/2023     2:36 PM 7/7/2023     9:59 AM   DISTRESS SCREENING   Distress Score 5 6 4   Practical Problems None of these Transportation None of these   Family Problems None of these  None of these   Emotional Problems None of these Worry None of these   Spiritual / Spiritism Concerns No  No   Physical Problems None of these Fatigue None of these        Symptoms:   Mood: denied;  no prior; no SI/HI; PHQ-9 score: 7  Anxiety: denied; no prior;  KVNG-7=2  Substance abuse: denied  Cognitive functioning: denied  Health behaviors: noncontributory  Sleep: denied, may be sleeping too much (10 hours with a nap)  Pain: Mr. Gómez reports lower back  pain pain. The pain is rated 0 /10 on the BEST day and7 /10 on the WORST DAY.  Symptoms are exacerbated by walking for more than 10 minutes. Factors which relieve the pain include acetaminophen and rest. The patient is most bothered by walking .     Assessment - Diagnosis - Goals:       ICD-10-CM ICD-9-CM   1. Anxiety about health  F41.8 300.09   2. Glioblastoma determined by biopsy of brain  C71.9 191.9         Plan: Patient will contact this provider to schedule if he needs psychological support in the future.    Summary and Recommendations  Chacorta Gómez is a 78 y.o. male referred by Alexa Lisa MD for psychological evaluation and treatment.  Mr. Gómez appears to be coping well with his diagnosis and proposed treatment course.  There are no recommendations for psychological treatment at this time. He is interested in physical therapy and was encouraged to let this provider know if he would like a referral for PT at [facility]. His wife voiced interest in psychological services and was encouraged to receive a referral if she would like to begin treatment here. Lastly, they were encouraged to obtain an [facility] calendar and explore the different monthly activities.     Return to clinic: as needed                      Alessandra Sparks, PhD  Clinical Health Psychology Fellow

## 2023-11-09 ENCOUNTER — OFFICE VISIT (OUTPATIENT)
Dept: PSYCHIATRY | Facility: CLINIC | Age: 78
End: 2023-11-09
Payer: MEDICARE

## 2023-11-09 DIAGNOSIS — C71.9 GLIOBLASTOMA DETERMINED BY BIOPSY OF BRAIN: ICD-10-CM

## 2023-11-09 DIAGNOSIS — R45.89 ANXIETY ABOUT HEALTH: Primary | ICD-10-CM

## 2023-11-09 PROCEDURE — 99999 PR PBB SHADOW E&M-EST. PATIENT-LVL I: ICD-10-PCS | Mod: PBBFAC,,,

## 2023-11-09 PROCEDURE — 90791 PR PSYCHIATRIC DIAGNOSTIC EVALUATION: ICD-10-PCS | Mod: S$GLB,,,

## 2023-11-09 PROCEDURE — 90791 PSYCH DIAGNOSTIC EVALUATION: CPT | Mod: S$GLB,,,

## 2023-11-09 PROCEDURE — 99999 PR PBB SHADOW E&M-EST. PATIENT-LVL I: CPT | Mod: PBBFAC,,,

## 2023-11-22 DIAGNOSIS — C71.9 GLIOBLASTOMA DETERMINED BY BIOPSY OF BRAIN: ICD-10-CM

## 2023-11-30 ENCOUNTER — TELEPHONE (OUTPATIENT)
Dept: NEUROLOGY | Facility: CLINIC | Age: 78
End: 2023-11-30
Payer: MEDICARE

## 2023-11-30 ENCOUNTER — OFFICE VISIT (OUTPATIENT)
Dept: NEUROLOGY | Facility: CLINIC | Age: 78
End: 2023-11-30
Payer: MEDICARE

## 2023-11-30 DIAGNOSIS — G20.C PARKINSONISM, UNSPECIFIED PARKINSONISM TYPE: ICD-10-CM

## 2023-11-30 DIAGNOSIS — C71.9 GLIOBLASTOMA MULTIFORME OF BRAIN: Primary | ICD-10-CM

## 2023-11-30 DIAGNOSIS — R56.9 SEIZURES: ICD-10-CM

## 2023-11-30 PROCEDURE — 99215 PR OFFICE/OUTPT VISIT, EST, LEVL V, 40-54 MIN: ICD-10-PCS | Mod: 95,,, | Performed by: PSYCHIATRY & NEUROLOGY

## 2023-11-30 PROCEDURE — 99215 OFFICE O/P EST HI 40 MIN: CPT | Mod: 95,,, | Performed by: PSYCHIATRY & NEUROLOGY

## 2023-11-30 RX ORDER — TEMOZOLOMIDE 20 MG/1
40 CAPSULE ORAL DAILY
Qty: 10 CAPSULE | Refills: 0 | OUTPATIENT
Start: 2023-11-30 | End: 2023-12-05

## 2023-11-30 RX ORDER — TEMOZOLOMIDE 250 MG/1
250 CAPSULE ORAL DAILY
Qty: 5 CAPSULE | Refills: 0 | OUTPATIENT
Start: 2023-11-30 | End: 2023-12-05

## 2023-11-30 NOTE — TELEPHONE ENCOUNTER
Called and spoke to patient to reschedule appt as a virtual today. Patient stated this would be easier and asked we reschedule those labs today to be done in [place]. Stated I would reschedule these labs today at 2:15 at the [place] lab and rescheduled patient appt to virtual visit. Patient confirmed all appointments.     ----- Message from Noe Pascual sent at 11/30/2023 12:10 PM CST -----  Regarding: Appt  Contact: Uyen [phone]  Uyen/wife calling regarding appt for 11/30 @3pm. Would like to speak to staff. Would like to have appt as a virtual visit if possible, also change date of labs if okay to virtual appt. Please call [phone]

## 2023-11-30 NOTE — PROGRESS NOTES
Subjective:       Patient ID: Chacorta Gómez is a 78 y.o. male.    Chief Complaint: glioblastoma    HPI  Oncologic History:   2/2023: admitted to outside hospital for new onset seizure, he was started on keppra. MRI brain was done and was reportedly suspicious for subacute infarct.   4-5/2023: was in [place] for a work trip, had recurrent seizures, was hospitalized there for 10 days.   6/2023: hospitalized for breakthrough seizures, lacosamide was added, underwent repeat MRI brain which showed 4.2 x 2.6 x 4.1 cm heterogeneous enhancing hemorrhagic medial right frontal lesion. A 2nd lesion along the planum sphenoidale has a dural base but appears to also involve the inferior frontal lobe parenchyma. Patient was seen by neurosurgery and underwent subtotal resection. Pathology consistent with GBM.   8/8-28/2023: chemoradiation to 40Gy over 15fx (Alexa Lisa)  10/2023: C1 TMZ @150mg/m2    Chacorta tolerated C1 without issue. He has noticed improvement in his tremor with Sinemet - he can eat soup without spilling it as much. No new symptoms.    Past Medical History:   Diagnosis Date    Benign localized hyperplasia of prostate with urinary obstruction and other lower urinary tract symptoms (LUTS)(600.21)     Brain lesion     CVA (cerebral vascular accident)     [facility]    Essential hypertension 07/12/2016    Glaucoma     bilat    Multinodular goiter     Seizures     Spontaneous ecchymoses     Unspecified essential hypertension       Current Outpatient Medications   Medication Sig Dispense Refill    atorvastatin (LIPITOR) 40 MG tablet Take 1 tablet (40 mg total) by mouth every evening. 90 tablet 3    carbidopa-levodopa  mg (SINEMET)  mg per tablet Take 1 tablet by mouth 3 (three) times daily. 90 tablet 11    chlorpheniramine (CHLOR-TRIMETON) 4 mg tablet Take 4 mg by mouth daily as needed (sneezing attacks).      divalproex (DEPAKOTE) 500 MG TbEC Take 2 tablets (1,000 mg total) by mouth every 12  (twelve) hours. 120 tablet 2    dorzolamide-timolol 2-0.5% (COSOPT) 22.3-6.8 mg/mL ophthalmic solution Place 1 drop into both eyes 2 (two) times daily.      famotidine (PEPCID) 20 MG tablet Take 20 mg by mouth daily as needed for Heartburn.      finasteride (PROSCAR) 5 mg tablet TAKE 1 TABLET EVERY DAY (Patient taking differently: Take 5 mg by mouth once daily.) 90 tablet 3    folic acid (FOLVITE) 1 MG tablet Take 1 tablet (1 mg total) by mouth once daily. (Patient not taking: Reported on 10/11/2023) 90 tablet 3    furosemide (LASIX) 20 MG tablet Take 1 tablet (20 mg total) by mouth daily as needed (swelling). 30 tablet 0    hydroCHLOROthiazide (HYDRODIURIL) 12.5 MG Tab TAKE 1 TABLET EVERY DAY 90 tablet 3    lacosamide (VIMPAT) 100 mg Tab Take 1 tablet (100 mg total) by mouth every 12 (twelve) hours. For additional seizure prevention 60 tablet 0    latanoprost 0.005 % ophthalmic solution Place 1 drop into both eyes every evening.      lisinopriL 10 MG tablet Take 1 tablet (10 mg total) by mouth once daily. (Patient not taking: Reported on 10/11/2023) 90 tablet 3    loratadine (CLARITIN) 10 mg tablet Take 10 mg by mouth once daily.      multivitamin Tab Take 1 tablet by mouth once daily. 30 tablet 0    mupirocin (BACTROBAN) 2 % ointment Apply topically 2 (two) times daily. To areas of abrasion/irritation on face/around lips-nose/left ear (Patient not taking: Reported on 10/11/2023) 15 g 0    ondansetron (ZOFRAN) 8 MG tablet Take 8mg (1 tab) 30-45 minutes prior to chemotherapy dose and every 8 hours as needed (Patient not taking: Reported on 10/11/2023) 30 tablet 3    oxyCODONE-acetaminophen (PERCOCET) 7.5-325 mg per tablet Take 1 tablet by mouth every 6 (six) hours as needed for Pain. (Patient not taking: Reported on 9/6/2023) 28 tablet 0    thiamine 100 MG tablet Take 100 mg by mouth once daily.       No current facility-administered medications for this visit.      Past Surgical History:   Procedure Laterality Date     COLONOSCOPY  08/03/2009    Dr. Askew    CRANIOTOMY FOR EXCISION OF INTRACRANIAL TUMOR      CRANIOTOMY, WITH NEOPLASM EXCISION USING COMPUTER-ASSISTED NAVIGATION Right 6/13/2023    Procedure: CRANIOTOMY, WITH NEOPLASM EXCISION USING COMPUTER-ASSISTED NAVIGATION -     RIGHT FRONTAL;  Surgeon: Joao English MD;  Location: [facility] OR;  Service: Neurosurgery;  Laterality: Right;    NASAL SEPTUM SURGERY      TONSILLECTOMY        Family History   Problem Relation Age of Onset    Heart attack Mother     Hypertension Mother     Cancer Father         prostate     Hypertension Brother     Stroke Brother         mild       Social History     Tobacco Use    Smoking status: Never     Passive exposure: Never    Smokeless tobacco: Never   Substance Use Topics    Alcohol use: Not Currently     Alcohol/week: 14.0 standard drinks of alcohol     Types: 7 Glasses of wine, 7 Cans of beer per week    Drug use: Never      Review of Systems  KPS: 80        Objective:      There were no vitals filed for this visit.  Telehealth visit     (Prior)  NEUROLOGICAL EXAMINATION:     MENTAL STATUS   Attention: normal. Concentration: normal.   Speech: speech is normal   Level of consciousness: alert    CRANIAL NERVES   Cranial nerves II through XII intact.     MOTOR EXAM   Muscle bulk: normal  Right arm tone: increased    Strength   Strength 5/5 throughout.        Rigidity and bradykinesia R>L  Medium-frequency, low to medium-amplitude tremor of the right hand>left, more at rest than with posture or action     SENSORY EXAM   Light touch normal.     GAIT AND COORDINATION      Coordination   Finger to nose coordination: normal       Stooped posture with mild shuffling. En bloc, multistep turn.        MRI brain from 10/11/2023 was personally visualized and compared to prior.  1. There has been minimal change in size of the enhancing lesion in the inferior right frontal lobe but the lesion is now centrally necrotic.  Additionally, the amount of  surrounding vasogenic edema has increased.  FLAIR hyperintense signal extend cephalad to the high right frontal resection region.  Some of these changes could certainly represent treatment change.  Interval progression of disease, particularly involving the inferior right frontal lobe lesion, cannot be excluded.  2. In the high medial right frontal resection cavity, there is persistent nodular enhancement along the periphery.  On the most recent prior study this enhancement was somewhat ill-defined and appears more defined and contracted on the current study.  Continued surveillance is warranted.    Assessment:       Problem List Items Addressed This Visit          Neuro    Parkinsonism, unspecified Parkinsonism type       Oncology    Glioblastoma multiforme of brain - Primary     Other Visit Diagnoses       Seizures                    Plan:       Chacorta Gómez is a 78 y.o. male with a glioblastoma, MGMT-methylated, IDH-wt, WHO grade 4 s/p subtotal resection and chemoradiation to 40Gy over 15fx presenting for treatment recommendations. Tumor diagnosis was heralded by seizure.     Glioblastoma  MRI brain without evidence of recorrence. Laboratory evaluation is adequate for continuation of adjuvant TMZ C2 @200mg/m2 days 1-5/28. F/u in 4 weeks with CBC/CMP, MRI brain in anticipation of C3 TMZ.    Tremor/parkinsonism  Pill rolling tremor, bradykinesia, and rigidity R>L along with stooped posture and shuffling consistent with parkinsonism. It is odd that he and wife feel this started suddenly after his surgery as he is at least a H&Y stage 2. Symptoms mildly improved on Sinemet 25-100mg 1 tab tid. Increase to 1.5 tabs tid.    Seizures  Continue levetiracetam 1g bid and lacosamide 100mg bid      The patient location is: home in [place]  The chief complaint leading to consultation is: glioblastoma    Visit type: audiovisual    Face to Face time with patient: 10 minutes    Each patient to whom he or she provides medical  services by telemedicine is:  (1) informed of the relationship between the physician and patient and the respective role of any other health care provider with respect to management of the patient; and (2) notified that he or she may decline to receive medical services by telemedicine and may withdraw from such care at any time.    Notes:                   Lucita Macias MD  Department of Neurology  Neuro-Oncology, Movement Disorders

## 2023-11-30 NOTE — TELEPHONE ENCOUNTER
----- Message from Noe Pascual sent at 11/30/2023  4:04 PM CST -----  Regarding: Appt  Contact: Pt [phone]  Missed Callback         Pt returning callback from missed call. Requesting to speak with somebody in   office. Please call [phone].

## 2023-12-01 RX ORDER — TEMOZOLOMIDE 250 MG/1
250 CAPSULE ORAL DAILY
Qty: 5 CAPSULE | Refills: 0 | Status: ACTIVE | OUTPATIENT
Start: 2023-12-01 | End: 2024-01-10

## 2023-12-01 RX ORDER — TEMOZOLOMIDE 140 MG/1
140 CAPSULE ORAL DAILY
Qty: 5 CAPSULE | Refills: 0 | Status: ACTIVE | OUTPATIENT
Start: 2023-12-01 | End: 2024-01-10

## 2023-12-06 ENCOUNTER — TELEPHONE (OUTPATIENT)
Dept: NEUROLOGY | Facility: CLINIC | Age: 78
End: 2023-12-06
Payer: MEDICARE

## 2023-12-06 NOTE — TELEPHONE ENCOUNTER
Called and LVM in regards to pharmacy message and patient not answering call about medications. Asked to call us back at his soonest convenience. Provided patient call back number.

## 2023-12-07 ENCOUNTER — TELEPHONE (OUTPATIENT)
Dept: NEUROLOGY | Facility: CLINIC | Age: 78
End: 2023-12-07
Payer: MEDICARE

## 2023-12-08 ENCOUNTER — TELEPHONE (OUTPATIENT)
Dept: NEUROSURGERY | Facility: CLINIC | Age: 78
End: 2023-12-08
Payer: MEDICARE

## 2023-12-08 ENCOUNTER — DOCUMENTATION ONLY (OUTPATIENT)
Dept: HEMATOLOGY/ONCOLOGY | Facility: CLINIC | Age: 78
End: 2023-12-08
Payer: MEDICARE

## 2023-12-08 NOTE — PROGRESS NOTES
Received referral from the clinic asking to assist patient with co pay for medication. Patient in need of temodar but having difficulty affording the co pay. Requesting assistance from [employer]. Cost transfer sent to pharmacy for $124.20.

## 2023-12-08 NOTE — TELEPHONE ENCOUNTER
Attempted to call Pt and spouse regarding setting up delivery for chemotherapy. No answer, no VM available.

## 2023-12-11 ENCOUNTER — PATIENT MESSAGE (OUTPATIENT)
Dept: RADIOLOGY | Facility: HOSPITAL | Age: 78
End: 2023-12-11
Payer: MEDICARE

## 2023-12-12 ENCOUNTER — OFFICE VISIT (OUTPATIENT)
Dept: NEUROSURGERY | Facility: CLINIC | Age: 78
End: 2023-12-12
Payer: MEDICARE

## 2023-12-12 ENCOUNTER — TELEPHONE (OUTPATIENT)
Dept: NEUROSURGERY | Facility: CLINIC | Age: 78
End: 2023-12-12
Payer: MEDICARE

## 2023-12-12 ENCOUNTER — HOSPITAL ENCOUNTER (OUTPATIENT)
Dept: RADIOLOGY | Facility: HOSPITAL | Age: 78
Discharge: HOME | End: 2023-12-12
Attending: NEUROLOGICAL SURGERY
Payer: MEDICARE

## 2023-12-12 VITALS
HEIGHT: 72 IN | WEIGHT: 164 LBS | SYSTOLIC BLOOD PRESSURE: 135 MMHG | BODY MASS INDEX: 22.21 KG/M2 | DIASTOLIC BLOOD PRESSURE: 80 MMHG | RESPIRATION RATE: 18 BRPM | HEART RATE: 66 BPM

## 2023-12-12 DIAGNOSIS — G93.9 BRAIN LESION: Primary | ICD-10-CM

## 2023-12-12 DIAGNOSIS — C71.9 GLIOBLASTOMA MULTIFORME OF BRAIN: ICD-10-CM

## 2023-12-12 PROCEDURE — 99215 OFFICE O/P EST HI 40 MIN: CPT | Mod: S$GLB,,, | Performed by: NEUROLOGICAL SURGERY

## 2023-12-12 PROCEDURE — 25500020 PHARM REV CODE 255: Mod: PO | Performed by: NEUROLOGICAL SURGERY

## 2023-12-12 PROCEDURE — A9585 GADOBUTROL INJECTION: HCPCS | Mod: PO | Performed by: NEUROLOGICAL SURGERY

## 2023-12-12 PROCEDURE — 1100F PTFALLS ASSESS-DOCD GE2>/YR: CPT | Mod: CPTII,S$GLB,, | Performed by: NEUROLOGICAL SURGERY

## 2023-12-12 PROCEDURE — 1126F AMNT PAIN NOTED NONE PRSNT: CPT | Mod: CPTII,S$GLB,, | Performed by: NEUROLOGICAL SURGERY

## 2023-12-12 PROCEDURE — 70553 MRI BRAIN STEM W/O & W/DYE: CPT | Mod: 26,,, | Performed by: RADIOLOGY

## 2023-12-12 PROCEDURE — 3288F FALL RISK ASSESSMENT DOCD: CPT | Mod: CPTII,S$GLB,, | Performed by: NEUROLOGICAL SURGERY

## 2023-12-12 PROCEDURE — 3075F SYST BP GE 130 - 139MM HG: CPT | Mod: CPTII,S$GLB,, | Performed by: NEUROLOGICAL SURGERY

## 2023-12-12 PROCEDURE — 70553 MRI BRAIN STEM W/O & W/DYE: CPT | Mod: TC,PO

## 2023-12-12 PROCEDURE — 70553 MRI BRAIN W WO CONTRAST: ICD-10-PCS | Mod: 26,,, | Performed by: RADIOLOGY

## 2023-12-12 PROCEDURE — 3079F DIAST BP 80-89 MM HG: CPT | Mod: CPTII,S$GLB,, | Performed by: NEUROLOGICAL SURGERY

## 2023-12-12 RX ORDER — GADOBUTROL 604.72 MG/ML
7 INJECTION INTRAVENOUS
Status: COMPLETED | OUTPATIENT
Start: 2023-12-12 | End: 2023-12-12

## 2023-12-12 RX ADMIN — GADOBUTROL 7 ML: 604.72 INJECTION INTRAVENOUS at 12:12

## 2023-12-12 NOTE — PROGRESS NOTES
Neurosurgery History & Physical    Patient ID: Chacorta Gómez is a 78 y.o. male.    Chief Complaint   Patient presents with    Follow-up     Patient present to clinic today as image review      Interval HPI 1/9/2024:  Mr. Oakes returns for repeat MRI brain with and without contrast.  He does continue to deal with tremors and surgery.      Interval HPI 10/11/2023:  Mr. Oakes is now status post his initial round of temozolomide and fractionated radiation therapy.  He overall is doing well.  He denies new focal neurologic deficit.  Does continue to have a right upper extremity tremor which has been present since surgery.  He does report that he feels somewhat off balance and deconditioned with his walking.     He also describes significant edema in his bilateral lower extremities which has been present since before surgery.  He states that he was started on a diuretic medication by his primary care physician but this has not cure the issue.     They have an appointment with Dr. Macias tomorrow.     He returns for routine surveillance MRI of the brain with and without contrast.     HPI 8/9/2023:  Mr. Gómez is a 78-year-old male status post right frontal craniotomy and resection of glioblastoma on 06/13/2023.  He had a satellite lesion in the inferior right frontal lobe which was not resected given its remote location.  Therefore, his diagnosis is multifocal glioblastoma.     He has started Temodar therapy with Dr. Macias and radiation therapy with Dr. Lisa.       Since surgery he has been doing well with improving activity level.      Review of Systems  Denies fevers chills nausea or vomiting    Past Medical History:   Diagnosis Date    Benign localized hyperplasia of prostate with urinary obstruction and other lower urinary tract symptoms (LUTS)(600.21)     Brain lesion     CVA (cerebral vascular accident)     [facility]    Essential hypertension 07/12/2016    Glaucoma     bilat    Multinodular  goiter     Seizures     Spontaneous ecchymoses     Unspecified essential hypertension      Social History     Socioeconomic History    Marital status:      Spouse name: Uyen    Number of children: 0   Tobacco Use    Smoking status: Never     Passive exposure: Never    Smokeless tobacco: Never   Substance and Sexual Activity    Alcohol use: Not Currently     Alcohol/week: 14.0 standard drinks of alcohol     Types: 7 Glasses of wine, 7 Cans of beer per week    Drug use: Never    Sexual activity: Yes     Partners: Female     Social Determinants of Health     Financial Resource Strain: Low Risk  (6/14/2023)    Overall Financial Resource Strain (CARDIA)     Difficulty of Paying Living Expenses: Not hard at all   Food Insecurity: No Food Insecurity (6/14/2023)    Hunger Vital Sign     Worried About Running Out of Food in the Last Year: Never true     Ran Out of Food in the Last Year: Never true   Transportation Needs: No Transportation Needs (6/14/2023)    PRAPARE - Transportation     Lack of Transportation (Medical): No     Lack of Transportation (Non-Medical): No   Physical Activity: Sufficiently Active (6/14/2023)    Exercise Vital Sign     Days of Exercise per Week: 7 days     Minutes of Exercise per Session: 30 min   Stress: Stress Concern Present (6/14/2023)    [employer] - Occupational Stress Questionnaire     Feeling of Stress : Rather much   Social Connections: Moderately Integrated (6/14/2023)    Social Connection and Isolation Panel [NHANES]     Frequency of Communication with Friends and Family: More than three times a week     Frequency of Social Gatherings with Friends and Family: More than three times a week     Attends Mandaeism Services: More than 4 times per year     Active Member of Clubs or Organizations: No     Attends Club or Organization Meetings: Never     Marital Status:    Housing Stability: Low Risk  (6/14/2023)    Housing Stability Vital Sign      Unable to Pay for Housing in the Last Year: No     Number of Places Lived in the Last Year: 1     Unstable Housing in the Last Year: No     Family History   Problem Relation Age of Onset    Heart attack Mother     Hypertension Mother     Cancer Father         prostate     Hypertension Brother     Stroke Brother         mild      Review of patient's allergies indicates:  No Known Allergies    Current Outpatient Medications:     atorvastatin (LIPITOR) 40 MG tablet, Take 1 tablet (40 mg total) by mouth every evening., Disp: 90 tablet, Rfl: 3    carbidopa-levodopa  mg (SINEMET)  mg per tablet, Take 1 tablet by mouth 3 (three) times daily., Disp: 90 tablet, Rfl: 11    chlorpheniramine (CHLOR-TRIMETON) 4 mg tablet, Take 4 mg by mouth daily as needed (sneezing attacks)., Disp: , Rfl:     divalproex (DEPAKOTE) 500 MG TbEC, Take 2 tablets (1,000 mg total) by mouth every 12 (twelve) hours., Disp: 120 tablet, Rfl: 2    dorzolamide-timolol 2-0.5% (COSOPT) 22.3-6.8 mg/mL ophthalmic solution, Place 1 drop into both eyes 2 (two) times daily., Disp: , Rfl:     famotidine (PEPCID) 20 MG tablet, Take 20 mg by mouth daily as needed for Heartburn., Disp: , Rfl:     finasteride (PROSCAR) 5 mg tablet, TAKE 1 TABLET EVERY DAY (Patient taking differently: Take 5 mg by mouth once daily.), Disp: 90 tablet, Rfl: 3    hydroCHLOROthiazide (HYDRODIURIL) 12.5 MG Tab, TAKE 1 TABLET EVERY DAY, Disp: 90 tablet, Rfl: 3    lacosamide (VIMPAT) 100 mg Tab, Take 1 tablet (100 mg total) by mouth every 12 (twelve) hours. For additional seizure prevention, Disp: 60 tablet, Rfl: 0    latanoprost 0.005 % ophthalmic solution, Place 1 drop into both eyes every evening., Disp: , Rfl:     loratadine (CLARITIN) 10 mg tablet, Take 10 mg by mouth once daily., Disp: , Rfl:     multivitamin Tab, Take 1 tablet by mouth once daily., Disp: 30 tablet, Rfl: 0    temozolomide (TEMODAR) 140 MG capsule, Take 1 capsule (140 mg total) by mouth once daily on days 1-5  of a 28-day cycle, with 1 other temozolomide prescription for 390 mg total., Disp: 5 capsule, Rfl: 0    temozolomide (TEMODAR) 250 MG capsule, Take 1 capsule (250 mg total) by mouth once daily on days 1-5 of a 28-day cycle, with 1 other temozolomide prescription for 390 mg total., Disp: 5 capsule, Rfl: 0    thiamine 100 MG tablet, Take 100 mg by mouth once daily., Disp: , Rfl:     folic acid (FOLVITE) 1 MG tablet, Take 1 tablet (1 mg total) by mouth once daily., Disp: 90 tablet, Rfl: 3    furosemide (LASIX) 20 MG tablet, Take 1 tablet (20 mg total) by mouth daily as needed (swelling)., Disp: 30 tablet, Rfl: 0    lisinopriL 10 MG tablet, Take 1 tablet (10 mg total) by mouth once daily., Disp: 90 tablet, Rfl: 3    mupirocin (BACTROBAN) 2 % ointment, Apply topically 2 (two) times daily. To areas of abrasion/irritation on face/around lips-nose/left ear, Disp: 15 g, Rfl: 0    ondansetron (ZOFRAN) 8 MG tablet, Take 8mg (1 tab) 30-45 minutes prior to chemotherapy dose and every 8 hours as needed, Disp: 30 tablet, Rfl: 3    oxyCODONE-acetaminophen (PERCOCET) 7.5-325 mg per tablet, Take 1 tablet by mouth every 6 (six) hours as needed for Pain., Disp: 28 tablet, Rfl: 0  Blood pressure 135/80, pulse 66, resp. rate 18, height 6' (1.829 m), weight 74.4 kg (164 lb).      Neurologic Exam  Alert and oriented x4   Strength is 5/5 in the bilateral upper and lower extremities   Resting tremor in the right upper extremity which increases with intention  Sensation is grossly intact to light touch  Wound is well healed    Imaging:  MRI of the brain with and without contrast dated 12/12/2023 is personally reviewed and discussed with the patient.  There are subtle changes in the enhancement pattern as well as FLAIR signal surrounding the resection cavity.  There is some decrease in FLAIR signal surrounding the inferior right frontal lesion.    Assessment/Plan:   Mr. Gómez is a 78-year-old gentleman with multifocal glioblastoma now  approximately 6 months status post resection of the dominant lesion.     He is continuing to follow with Dr. Macias  No role for repeat surgical intervention at this time.  We will follow up in 2 months with a repeat MRI of the brain with and without contrast.

## 2023-12-28 DIAGNOSIS — C71.9 GLIOBLASTOMA MULTIFORME OF BRAIN: ICD-10-CM

## 2023-12-28 NOTE — TELEPHONE ENCOUNTER
S/W Pt regarding nausea + request for refill.     Reports taking chemo beginning approx 2 days ago. C/o persistent nausea with chemo administration. Educated Pt on pre-medication with zofran prior to chemo dose as well as taking the medication q8hr as needed for symptoms. Additionally discussed adequate hydration and bowel regimen. Requested refill of zofran sent to local [facility].     Phoned-in refill.

## 2023-12-28 NOTE — TELEPHONE ENCOUNTER
----- Message from Rosalina Ventura sent at 12/28/2023  9:52 AM CST -----  PATIENTCALL     Pt wife is calling to speak with someone in provider office regarding she wants to speak to the nurse because the patient is having nausea and wants something call to the pharmacy she is asking for a return call please call pt at [phone] if they don't  answer the phone it has bad connection        [id number] [place] [address] [address] [facility] AT [place] [address] & [employer] [address] [place] [id number]  Phone: [phone] Fax: [phone]

## 2023-12-29 RX ORDER — ONDANSETRON HYDROCHLORIDE 8 MG/1
TABLET, FILM COATED ORAL
Qty: 30 TABLET | Refills: 3 | Status: SHIPPED | OUTPATIENT
Start: 2023-12-29

## 2024-01-04 ENCOUNTER — LAB VISIT (OUTPATIENT)
Dept: LAB | Facility: HOSPITAL | Age: 79
End: 2024-01-04
Attending: PSYCHIATRY & NEUROLOGY
Payer: MEDICARE

## 2024-01-04 ENCOUNTER — OFFICE VISIT (OUTPATIENT)
Dept: NEUROLOGY | Facility: CLINIC | Age: 79
End: 2024-01-04
Payer: MEDICARE

## 2024-01-04 VITALS
SYSTOLIC BLOOD PRESSURE: 139 MMHG | HEIGHT: 72 IN | BODY MASS INDEX: 22.11 KG/M2 | WEIGHT: 163.25 LBS | HEART RATE: 54 BPM | DIASTOLIC BLOOD PRESSURE: 87 MMHG

## 2024-01-04 DIAGNOSIS — R25.1 TREMOR: ICD-10-CM

## 2024-01-04 DIAGNOSIS — C71.9 GLIOBLASTOMA DETERMINED BY BIOPSY OF BRAIN: ICD-10-CM

## 2024-01-04 DIAGNOSIS — C71.9 GLIOBLASTOMA MULTIFORME OF BRAIN: Primary | ICD-10-CM

## 2024-01-04 DIAGNOSIS — R56.9 SEIZURES: ICD-10-CM

## 2024-01-04 DIAGNOSIS — G20.C PARKINSONISM, UNSPECIFIED PARKINSONISM TYPE: ICD-10-CM

## 2024-01-04 PROBLEM — G20.A2 PARKINSON'S DISEASE WITHOUT DYSKINESIA, WITH FLUCTUATING MANIFESTATIONS: Status: ACTIVE | Noted: 2023-10-27

## 2024-01-04 LAB
ALBUMIN SERPL BCP-MCNC: 3.7 G/DL (ref 3.5–5.2)
ALP SERPL-CCNC: 40 U/L (ref 55–135)
ALT SERPL W/O P-5'-P-CCNC: 6 U/L (ref 10–44)
ANION GAP SERPL CALC-SCNC: 9 MMOL/L (ref 8–16)
AST SERPL-CCNC: 23 U/L (ref 10–40)
BASOPHILS # BLD AUTO: 0.02 K/UL (ref 0–0.2)
BASOPHILS NFR BLD: 0.4 % (ref 0–1.9)
BILIRUB SERPL-MCNC: 0.4 MG/DL (ref 0.1–1)
BUN SERPL-MCNC: 18 MG/DL (ref 8–23)
CALCIUM SERPL-MCNC: 9.6 MG/DL (ref 8.7–10.5)
CHLORIDE SERPL-SCNC: 101 MMOL/L (ref 95–110)
CO2 SERPL-SCNC: 32 MMOL/L (ref 23–29)
CREAT SERPL-MCNC: 0.7 MG/DL (ref 0.5–1.4)
DIFFERENTIAL METHOD BLD: ABNORMAL
EOSINOPHIL # BLD AUTO: 0.1 K/UL (ref 0–0.5)
EOSINOPHIL NFR BLD: 1.6 % (ref 0–8)
ERYTHROCYTE [DISTWIDTH] IN BLOOD BY AUTOMATED COUNT: 14.5 % (ref 11.5–14.5)
EST. GFR  (NO RACE VARIABLE): >60 ML/MIN/1.73 M^2
GLUCOSE SERPL-MCNC: 81 MG/DL (ref 70–110)
HCT VFR BLD AUTO: 38.3 % (ref 40–54)
HGB BLD-MCNC: 12.8 G/DL (ref 14–18)
IMM GRANULOCYTES # BLD AUTO: 0.02 K/UL (ref 0–0.04)
IMM GRANULOCYTES NFR BLD AUTO: 0.4 % (ref 0–0.5)
LYMPHOCYTES # BLD AUTO: 1.8 K/UL (ref 1–4.8)
LYMPHOCYTES NFR BLD: 33.2 % (ref 18–48)
MCH RBC QN AUTO: 32.8 PG (ref 27–31)
MCHC RBC AUTO-ENTMCNC: 33.4 G/DL (ref 32–36)
MCV RBC AUTO: 98 FL (ref 82–98)
MONOCYTES # BLD AUTO: 0.8 K/UL (ref 0.3–1)
MONOCYTES NFR BLD: 14.1 % (ref 4–15)
NEUTROPHILS # BLD AUTO: 2.8 K/UL (ref 1.8–7.7)
NEUTROPHILS NFR BLD: 50.3 % (ref 38–73)
NRBC BLD-RTO: 0 /100 WBC
PLATELET # BLD AUTO: 190 K/UL (ref 150–450)
PMV BLD AUTO: 10.1 FL (ref 9.2–12.9)
POTASSIUM SERPL-SCNC: 3.8 MMOL/L (ref 3.5–5.1)
PROT SERPL-MCNC: 6.4 G/DL (ref 6–8.4)
RBC # BLD AUTO: 3.9 M/UL (ref 4.6–6.2)
SODIUM SERPL-SCNC: 142 MMOL/L (ref 136–145)
WBC # BLD AUTO: 5.54 K/UL (ref 3.9–12.7)

## 2024-01-04 PROCEDURE — 3075F SYST BP GE 130 - 139MM HG: CPT | Mod: CPTII,S$GLB,, | Performed by: PSYCHIATRY & NEUROLOGY

## 2024-01-04 PROCEDURE — 80053 COMPREHEN METABOLIC PANEL: CPT | Performed by: PSYCHIATRY & NEUROLOGY

## 2024-01-04 PROCEDURE — 3079F DIAST BP 80-89 MM HG: CPT | Mod: CPTII,S$GLB,, | Performed by: PSYCHIATRY & NEUROLOGY

## 2024-01-04 PROCEDURE — 3288F FALL RISK ASSESSMENT DOCD: CPT | Mod: CPTII,S$GLB,, | Performed by: PSYCHIATRY & NEUROLOGY

## 2024-01-04 PROCEDURE — 1159F MED LIST DOCD IN RCRD: CPT | Mod: CPTII,S$GLB,, | Performed by: PSYCHIATRY & NEUROLOGY

## 2024-01-04 PROCEDURE — 36415 COLL VENOUS BLD VENIPUNCTURE: CPT | Performed by: PSYCHIATRY & NEUROLOGY

## 2024-01-04 PROCEDURE — 99999 PR PBB SHADOW E&M-EST. PATIENT-LVL III: CPT | Mod: PBBFAC,,, | Performed by: PSYCHIATRY & NEUROLOGY

## 2024-01-04 PROCEDURE — 1101F PT FALLS ASSESS-DOCD LE1/YR: CPT | Mod: CPTII,S$GLB,, | Performed by: PSYCHIATRY & NEUROLOGY

## 2024-01-04 PROCEDURE — 99215 OFFICE O/P EST HI 40 MIN: CPT | Mod: S$GLB,,, | Performed by: PSYCHIATRY & NEUROLOGY

## 2024-01-04 PROCEDURE — 1126F AMNT PAIN NOTED NONE PRSNT: CPT | Mod: CPTII,S$GLB,, | Performed by: PSYCHIATRY & NEUROLOGY

## 2024-01-04 PROCEDURE — 85025 COMPLETE CBC W/AUTO DIFF WBC: CPT | Performed by: PSYCHIATRY & NEUROLOGY

## 2024-01-04 NOTE — PROGRESS NOTES
Subjective:       Patient ID: Chacorta Gómez is a 78 y.o. male.    Chief Complaint: glioblastoma    HPI  Oncologic History:   2/2023: admitted to outside hospital for new onset seizure, he was started on keppra. MRI brain was done and was reportedly suspicious for subacute infarct.   4-5/2023: was in [place] for a work trip, had recurrent seizures, was hospitalized there for 10 days.   6/2023: hospitalized for breakthrough seizures, lacosamide was added, underwent repeat MRI brain which showed 4.2 x 2.6 x 4.1 cm heterogeneous enhancing hemorrhagic medial right frontal lesion. A 2nd lesion along the planum sphenoidale has a dural base but appears to also involve the inferior frontal lobe parenchyma. Patient was seen by neurosurgery and underwent subtotal resection. Pathology consistent with GBM.   8/8-28/2023: chemoradiation to 40Gy over 15fx (Alexa Lisa)  10/2023: C1 TMZ @150mg/m2  12/2023: C2 TMZ @200mg/m2    Chacorta is feeling ok. He tolerated the last cycle of chemotherapy fine; he started it late, about a week and a half ago. Increasing the Sinemet to 1.5 pills tid did not seem to help his tremor any more. He tried TTF for 2 weeks but felt his quality of life was impaired by the device, so he stopped.    Past Medical History:   Diagnosis Date    Benign localized hyperplasia of prostate with urinary obstruction and other lower urinary tract symptoms (LUTS)(600.21)     Brain lesion     CVA (cerebral vascular accident)     [facility]    Essential hypertension 07/12/2016    Glaucoma     bilat    Multinodular goiter     Seizures     Spontaneous ecchymoses     Unspecified essential hypertension       Current Outpatient Medications   Medication Sig Dispense Refill    atorvastatin (LIPITOR) 40 MG tablet Take 1 tablet (40 mg total) by mouth every evening. 90 tablet 3    carbidopa-levodopa  mg (SINEMET)  mg per tablet Take 1 tablet by mouth 3 (three) times daily. 90 tablet 11    chlorpheniramine  (CHLOR-TRIMETON) 4 mg tablet Take 4 mg by mouth daily as needed (sneezing attacks).      divalproex (DEPAKOTE) 500 MG TbEC Take 2 tablets (1,000 mg total) by mouth every 12 (twelve) hours. 120 tablet 2    dorzolamide-timolol 2-0.5% (COSOPT) 22.3-6.8 mg/mL ophthalmic solution Place 1 drop into both eyes 2 (two) times daily.      famotidine (PEPCID) 20 MG tablet Take 20 mg by mouth daily as needed for Heartburn.      finasteride (PROSCAR) 5 mg tablet TAKE 1 TABLET EVERY DAY (Patient taking differently: Take 5 mg by mouth once daily.) 90 tablet 3    folic acid (FOLVITE) 1 MG tablet Take 1 tablet (1 mg total) by mouth once daily. 90 tablet 3    hydroCHLOROthiazide (HYDRODIURIL) 12.5 MG Tab TAKE 1 TABLET EVERY DAY 90 tablet 3    lacosamide (VIMPAT) 100 mg Tab Take 1 tablet (100 mg total) by mouth every 12 (twelve) hours. For additional seizure prevention 60 tablet 0    latanoprost 0.005 % ophthalmic solution Place 1 drop into both eyes every evening.      lisinopriL 10 MG tablet Take 1 tablet (10 mg total) by mouth once daily. 90 tablet 3    loratadine (CLARITIN) 10 mg tablet Take 10 mg by mouth once daily.      multivitamin Tab Take 1 tablet by mouth once daily. 30 tablet 0    mupirocin (BACTROBAN) 2 % ointment Apply topically 2 (two) times daily. To areas of abrasion/irritation on face/around lips-nose/left ear 15 g 0    ondansetron (ZOFRAN) 8 MG tablet Take 8mg (1 tab) 30-45 minutes prior to chemotherapy dose and every 8 hours as needed 30 tablet 3    oxyCODONE-acetaminophen (PERCOCET) 7.5-325 mg per tablet Take 1 tablet by mouth every 6 (six) hours as needed for Pain. 28 tablet 0    temozolomide (TEMODAR) 140 MG capsule Take 1 capsule (140 mg total) by mouth once daily on days 1-5 of a 28-day cycle, with 1 other temozolomide prescription for 390 mg total. 5 capsule 0    temozolomide (TEMODAR) 250 MG capsule Take 1 capsule (250 mg total) by mouth once daily on days 1-5 of a 28-day cycle, with 1 other temozolomide  prescription for 390 mg total. 5 capsule 0    thiamine 100 MG tablet Take 100 mg by mouth once daily.      furosemide (LASIX) 20 MG tablet Take 1 tablet (20 mg total) by mouth daily as needed (swelling). 30 tablet 0     No current facility-administered medications for this visit.      Past Surgical History:   Procedure Laterality Date    COLONOSCOPY  08/03/2009    Dr. Askew    CRANIOTOMY FOR EXCISION OF INTRACRANIAL TUMOR      CRANIOTOMY, WITH NEOPLASM EXCISION USING COMPUTER-ASSISTED NAVIGATION Right 6/13/2023    Procedure: CRANIOTOMY, WITH NEOPLASM EXCISION USING COMPUTER-ASSISTED NAVIGATION -     RIGHT FRONTAL;  Surgeon: Joao English MD;  Location: [facility] OR;  Service: Neurosurgery;  Laterality: Right;    NASAL SEPTUM SURGERY      TONSILLECTOMY        Family History   Problem Relation Age of Onset    Heart attack Mother     Hypertension Mother     Cancer Father         prostate     Hypertension Brother     Stroke Brother         mild       Social History     Tobacco Use    Smoking status: Never     Passive exposure: Never    Smokeless tobacco: Never   Substance Use Topics    Alcohol use: Not Currently     Alcohol/week: 14.0 standard drinks of alcohol     Types: 7 Glasses of wine, 7 Cans of beer per week    Drug use: Never      Review of Systems  KPS: 80        Objective:      Vitals:    01/04/24 1412   BP: 139/87   Pulse: (!) 54         NEUROLOGICAL EXAMINATION:     MENTAL STATUS   Attention: normal. Concentration: normal.   Speech: speech is normal   Level of consciousness: alert    CRANIAL NERVES   Cranial nerves II through XII intact.     MOTOR EXAM   Muscle bulk: normal  Right arm tone: increased    Strength   Strength 5/5 throughout.        Rigidity and bradykinesia R>L  Medium-frequency, low to medium-amplitude tremor of the right hand>left, more at rest than with posture or action     SENSORY EXAM   Light touch normal.     GAIT AND COORDINATION      Coordination   Finger to nose coordination: normal        Stooped posture with mild shuffling. En bloc, multistep turn.        MRI brain from 12/12/2023 was personally visualized and compared to prior.  1. There may be slight decrease in the amount of FLAIR hyperintense signal associated with the inferior right frontal enhancing mass.  Also, transverse measurement of this irregular enhancing mass has minimally decreased but now the enhancement extends to the level of the right frontal horn.  Previously, there is no enhancement actually touching the lateral ventricle but there was nonspecific and nonenhancing FLAIR signal abnormality, possibly representing nonenhancing tumor or treatment change.  2. There is no significant or detrimental change in the high medial right frontal resection cavity where there is persistent nodular enhancement without interval progression.    Assessment:       Problem List Items Addressed This Visit          Oncology    Glioblastoma multiforme of brain - Primary     Other Visit Diagnoses       Seizures        Parkinsonism, unspecified Parkinsonism type        Tremor                      Plan:       Chacorta Gómez is a 78 y.o. male with a glioblastoma, MGMT-methylated, IDH-wt, WHO grade 4 s/p subtotal resection and chemoradiation to 40Gy over 15fx presenting for treatment recommendations. Tumor diagnosis was heralded by seizure.     Glioblastoma  MRI brain without evidence of recurrence. F/u in 2.5 weeks with CBC/CMP in anticipation of C3 TMZ.    Tremor/parkinsonism  Pill rolling tremor, bradykinesia, and rigidity R>L along with stooped posture and shuffling consistent with parkinsonism. It is odd that he and wife feel this started suddenly after his surgery as he is at least a H&Y stage 2. Symptoms mildly improved on Sinemet 25-100mg 1.5 tab tid. Continue to monitor.    Seizures  Continue levetiracetam 1g bid and lacosamide 100mg bid                Lucita Macias MD  Department of Neurology  Neuro-Oncology, Movement Disorders

## 2024-01-24 ENCOUNTER — TELEPHONE (OUTPATIENT)
Dept: NEUROLOGY | Facility: CLINIC | Age: 79
End: 2024-01-24
Payer: MEDICARE

## 2024-01-24 NOTE — TELEPHONE ENCOUNTER
----- Message from Kristal Schwartz sent at 1/24/2024 12:43 PM CST -----  Regarding: pt advice  Contact: [phone]  Pt calling in regards to virtual visit 1/24/24, pt internet was knock out due to the bad weather. Pls call

## 2024-01-25 ENCOUNTER — OFFICE VISIT (OUTPATIENT)
Dept: NEUROLOGY | Facility: CLINIC | Age: 79
End: 2024-01-25
Payer: MEDICARE

## 2024-01-25 DIAGNOSIS — C71.9 GLIOBLASTOMA DETERMINED BY BIOPSY OF BRAIN: Primary | ICD-10-CM

## 2024-01-25 DIAGNOSIS — C71.9 GLIOBLASTOMA MULTIFORME OF BRAIN: Primary | ICD-10-CM

## 2024-01-25 DIAGNOSIS — I10 ESSENTIAL HYPERTENSION: ICD-10-CM

## 2024-01-25 DIAGNOSIS — G20.C PARKINSONISM, UNSPECIFIED PARKINSONISM TYPE: ICD-10-CM

## 2024-01-25 DIAGNOSIS — R60.9 EDEMA, UNSPECIFIED TYPE: ICD-10-CM

## 2024-01-25 DIAGNOSIS — R56.9 SEIZURES: ICD-10-CM

## 2024-01-25 DIAGNOSIS — E78.5 HYPERLIPIDEMIA: ICD-10-CM

## 2024-01-25 DIAGNOSIS — R53.81 DEBILITY: ICD-10-CM

## 2024-01-25 PROCEDURE — 99215 OFFICE O/P EST HI 40 MIN: CPT | Mod: 95,,, | Performed by: PSYCHIATRY & NEUROLOGY

## 2024-01-25 PROCEDURE — 1159F MED LIST DOCD IN RCRD: CPT | Mod: CPTII,95,, | Performed by: PSYCHIATRY & NEUROLOGY

## 2024-01-25 PROCEDURE — 1160F RVW MEDS BY RX/DR IN RCRD: CPT | Mod: CPTII,95,, | Performed by: PSYCHIATRY & NEUROLOGY

## 2024-01-25 RX ORDER — FOLIC ACID 1 MG/1
1 TABLET ORAL DAILY
Qty: 90 TABLET | Refills: 3 | Status: SHIPPED | OUTPATIENT
Start: 2024-01-25

## 2024-01-25 RX ORDER — HYDROCHLOROTHIAZIDE 12.5 MG/1
12.5 TABLET ORAL DAILY
Qty: 90 TABLET | Refills: 3 | Status: SHIPPED | OUTPATIENT
Start: 2024-01-25

## 2024-01-25 NOTE — PROGRESS NOTES
Subjective:       Patient ID: Chacorta Gómez is a 78 y.o. male.    Chief Complaint: glioblastoma    HPI  Oncologic History:   2/2023: admitted to outside hospital for new onset seizure, he was started on keppra. MRI brain was done and was reportedly suspicious for subacute infarct.   4-5/2023: was in [place] for a work trip, had recurrent seizures, was hospitalized there for 10 days.   6/2023: hospitalized for breakthrough seizures, lacosamide was added, underwent repeat MRI brain which showed 4.2 x 2.6 x 4.1 cm heterogeneous enhancing hemorrhagic medial right frontal lesion. A 2nd lesion along the planum sphenoidale has a dural base but appears to also involve the inferior frontal lobe parenchyma. Patient was seen by neurosurgery and underwent subtotal resection. Pathology consistent with GBM.   8/8-28/2023: chemoradiation to 40Gy over 15fx (Alexa Lisa)  10/2023: C1 TMZ @150mg/m2  12/2023: C2 TMZ @200mg/m2    Chacorta is feeling ok. He is mostly struggling with back pain and lower extremity swelling.    Past Medical History:   Diagnosis Date    Benign localized hyperplasia of prostate with urinary obstruction and other lower urinary tract symptoms (LUTS)(600.21)     Brain lesion     CVA (cerebral vascular accident)     [facility]    Essential hypertension 07/12/2016    Glaucoma     bilat    Multinodular goiter     Seizures     Spontaneous ecchymoses     Unspecified essential hypertension       Current Outpatient Medications   Medication Sig Dispense Refill    carbidopa-levodopa  mg (SINEMET)  mg per tablet Take 1 tablet by mouth 3 (three) times daily. 90 tablet 11    chlorpheniramine (CHLOR-TRIMETON) 4 mg tablet Take 4 mg by mouth daily as needed (sneezing attacks).      divalproex (DEPAKOTE) 500 MG TbEC Take 2 tablets (1,000 mg total) by mouth every 12 (twelve) hours. 120 tablet 2    dorzolamide-timolol 2-0.5% (COSOPT) 22.3-6.8 mg/mL ophthalmic solution Place 1 drop into both eyes 2 (two)  times daily.      famotidine (PEPCID) 20 MG tablet Take 20 mg by mouth daily as needed for Heartburn.      folic acid (FOLVITE) 1 MG tablet Take 1 tablet (1 mg total) by mouth once daily. 90 tablet 3    furosemide (LASIX) 20 MG tablet Take 1 tablet (20 mg total) by mouth daily as needed (swelling). 30 tablet 0    hydroCHLOROthiazide (HYDRODIURIL) 12.5 MG Tab Take 1 tablet (12.5 mg total) by mouth once daily. 90 tablet 3    latanoprost 0.005 % ophthalmic solution Place 1 drop into both eyes every evening.      loratadine (CLARITIN) 10 mg tablet Take 10 mg by mouth once daily.      multivitamin Tab Take 1 tablet by mouth once daily. 30 tablet 0    mupirocin (BACTROBAN) 2 % ointment Apply topically 2 (two) times daily. To areas of abrasion/irritation on face/around lips-nose/left ear 15 g 0    ondansetron (ZOFRAN) 8 MG tablet Take 8mg (1 tab) 30-45 minutes prior to chemotherapy dose and every 8 hours as needed 30 tablet 3    thiamine 100 MG tablet Take 100 mg by mouth once daily.       No current facility-administered medications for this visit.      Past Surgical History:   Procedure Laterality Date    COLONOSCOPY  08/03/2009    Dr. Askew    CRANIOTOMY FOR EXCISION OF INTRACRANIAL TUMOR      CRANIOTOMY, WITH NEOPLASM EXCISION USING COMPUTER-ASSISTED NAVIGATION Right 6/13/2023    Procedure: CRANIOTOMY, WITH NEOPLASM EXCISION USING COMPUTER-ASSISTED NAVIGATION -     RIGHT FRONTAL;  Surgeon: Joao English MD;  Location: [facility] OR;  Service: Neurosurgery;  Laterality: Right;    NASAL SEPTUM SURGERY      TONSILLECTOMY        Family History   Problem Relation Age of Onset    Heart attack Mother     Hypertension Mother     Cancer Father         prostate     Hypertension Brother     Stroke Brother         mild       Social History     Tobacco Use    Smoking status: Never     Passive exposure: Never    Smokeless tobacco: Never   Substance Use Topics    Alcohol use: Not Currently     Alcohol/week: 14.0 standard drinks of  alcohol     Types: 7 Glasses of wine, 7 Cans of beer per week    Drug use: Never      Review of Systems  KPS: 80        Objective:      There were no vitals filed for this visit.        NEUROLOGICAL EXAMINATION:     MENTAL STATUS   Attention: normal. Concentration: normal.   Speech: speech is normal   Level of consciousness: alert    CRANIAL NERVES   Cranial nerves II through XII intact.     MOTOR EXAM   Muscle bulk: normal  Right arm tone: increased    Strength   Strength 5/5 throughout.        Rigidity and bradykinesia R>L  Medium-frequency, low to medium-amplitude tremor of the right hand>left, more at rest than with posture or action     SENSORY EXAM   Light touch normal.     GAIT AND COORDINATION      Coordination   Finger to nose coordination: normal       Stooped posture with mild shuffling. En bloc, multistep turn.        MRI brain from 12/12/2023 was personally visualized and compared to prior.  1. There may be slight decrease in the amount of FLAIR hyperintense signal associated with the inferior right frontal enhancing mass.  Also, transverse measurement of this irregular enhancing mass has minimally decreased but now the enhancement extends to the level of the right frontal horn.  Previously, there is no enhancement actually touching the lateral ventricle but there was nonspecific and nonenhancing FLAIR signal abnormality, possibly representing nonenhancing tumor or treatment change.  2. There is no significant or detrimental change in the high medial right frontal resection cavity where there is persistent nodular enhancement without interval progression.    Assessment:       Problem List Items Addressed This Visit          Cardiac/Vascular    Essential hypertension    Relevant Medications    hydroCHLOROthiazide (HYDRODIURIL) 12.5 MG Tab       Oncology    Glioblastoma multiforme of brain - Primary    Relevant Orders    LIPID PANEL    CBC W/ AUTO DIFFERENTIAL    MRI Brain (Tumor with Perfusion) W W/O  Contrast (XPD)     Other Visit Diagnoses       Edema, unspecified type        Parkinsonism, unspecified Parkinsonism type        Relevant Medications    folic acid (FOLVITE) 1 MG tablet    Hyperlipidemia        Relevant Orders    LIPID PANEL    Seizures                        Plan:       Chacorta Gómez is a 78 y.o. male with a glioblastoma, MGMT-methylated, IDH-wt, WHO grade 4 s/p subtotal resection and chemoradiation to 40Gy over 15fx presenting for treatment recommendations. Tumor diagnosis was heralded by seizure.     Glioblastoma  Laboratory evaluation is not adequate for continuation of TMZ due to thrombocytopenia. Recheck CBC in 1 week; if adequate, will trial C3 @200mg/m2 again. F/u in 5 weeks with CBC/CMP and surveillance MRI brain.    Lower extremity edema  Has not seen PCP in some time; will reach out for assistance managing. Also referring to palliative care.    Tremor/parkinsonism  Pill rolling tremor, bradykinesia, and rigidity R>L along with stooped posture and shuffling consistent with parkinsonism. It is odd that he and wife feel this started suddenly after his surgery as he is at least a H&Y stage 2. Symptoms mildly improved on Sinemet 25-100mg 1.5 tab tid. Continue to monitor.    Seizures  Depakote 1000mg bid. Stopped Vimpat inadvertently; unclear when, but has not had additional seizures. CTM.    Hyperlipidemia  Has been out of atorvastatin for some time. Recheck lipid panel to assess need for statin. F/u with PCP.                Lucita Macias MD  Department of Neurology  Neuro-Oncology, Movement Disorders

## 2024-01-25 NOTE — Clinical Note
Hi Dr. Dupont, I'm taking care of Chacorta for his glioblastoma.  Would you be able to see him soon? He is having back pain (managed with tylenol) and lower extremity edema, along with hypotension.  He's been out of atorvastatin for a while, so I'm going to recheck a lipid panel to see if he still needs a statin. Thanks for your help!  Lucita Macias

## 2024-02-02 ENCOUNTER — LAB VISIT (OUTPATIENT)
Dept: LAB | Facility: HOSPITAL | Age: 79
End: 2024-02-02
Attending: PSYCHIATRY & NEUROLOGY
Payer: MEDICARE

## 2024-02-02 DIAGNOSIS — E78.5 HYPERLIPIDEMIA: ICD-10-CM

## 2024-02-02 DIAGNOSIS — C71.9 GLIOBLASTOMA MULTIFORME OF BRAIN: ICD-10-CM

## 2024-02-02 LAB
BASOPHILS # BLD AUTO: 0.02 K/UL (ref 0–0.2)
BASOPHILS NFR BLD: 0.5 % (ref 0–1.9)
CHOLEST SERPL-MCNC: 169 MG/DL (ref 120–199)
CHOLEST/HDLC SERPL: 2.8 {RATIO} (ref 2–5)
DIFFERENTIAL METHOD BLD: ABNORMAL
EOSINOPHIL # BLD AUTO: 0.1 K/UL (ref 0–0.5)
EOSINOPHIL NFR BLD: 2.9 % (ref 0–8)
ERYTHROCYTE [DISTWIDTH] IN BLOOD BY AUTOMATED COUNT: 15.6 % (ref 11.5–14.5)
HCT VFR BLD AUTO: 39.1 % (ref 40–54)
HDLC SERPL-MCNC: 60 MG/DL (ref 40–75)
HDLC SERPL: 35.5 % (ref 20–50)
HGB BLD-MCNC: 13 G/DL (ref 14–18)
IMM GRANULOCYTES # BLD AUTO: 0.01 K/UL (ref 0–0.04)
IMM GRANULOCYTES NFR BLD AUTO: 0.2 % (ref 0–0.5)
LDLC SERPL CALC-MCNC: 95.2 MG/DL (ref 63–159)
LYMPHOCYTES # BLD AUTO: 1.8 K/UL (ref 1–4.8)
LYMPHOCYTES NFR BLD: 44.2 % (ref 18–48)
MCH RBC QN AUTO: 32.3 PG (ref 27–31)
MCHC RBC AUTO-ENTMCNC: 33.2 G/DL (ref 32–36)
MCV RBC AUTO: 97 FL (ref 82–98)
MONOCYTES # BLD AUTO: 0.6 K/UL (ref 0.3–1)
MONOCYTES NFR BLD: 15.3 % (ref 4–15)
NEUTROPHILS # BLD AUTO: 1.5 K/UL (ref 1.8–7.7)
NEUTROPHILS NFR BLD: 36.9 % (ref 38–73)
NONHDLC SERPL-MCNC: 109 MG/DL
NRBC BLD-RTO: 0 /100 WBC
PLATELET # BLD AUTO: 173 K/UL (ref 150–450)
PMV BLD AUTO: 10.3 FL (ref 9.2–12.9)
RBC # BLD AUTO: 4.02 M/UL (ref 4.6–6.2)
TRIGL SERPL-MCNC: 69 MG/DL (ref 30–150)
WBC # BLD AUTO: 4.12 K/UL (ref 3.9–12.7)

## 2024-02-02 PROCEDURE — 36415 COLL VENOUS BLD VENIPUNCTURE: CPT | Mod: PO | Performed by: PSYCHIATRY & NEUROLOGY

## 2024-02-02 PROCEDURE — 80061 LIPID PANEL: CPT | Performed by: PSYCHIATRY & NEUROLOGY

## 2024-02-02 PROCEDURE — 85025 COMPLETE CBC W/AUTO DIFF WBC: CPT | Performed by: PSYCHIATRY & NEUROLOGY

## 2024-02-05 DIAGNOSIS — C71.9 GLIOBLASTOMA MULTIFORME OF BRAIN: Primary | ICD-10-CM

## 2024-02-05 RX ORDER — TEMOZOLOMIDE 140 MG/1
140 CAPSULE ORAL DAILY
Qty: 5 CAPSULE | Refills: 0 | Status: ACTIVE | OUTPATIENT
Start: 2024-02-05 | End: 2024-03-14 | Stop reason: SDUPTHER

## 2024-02-05 RX ORDER — TEMOZOLOMIDE 250 MG/1
250 CAPSULE ORAL DAILY
Qty: 5 CAPSULE | Refills: 0 | Status: ACTIVE | OUTPATIENT
Start: 2024-02-05 | End: 2024-03-14 | Stop reason: SDUPTHER

## 2024-02-07 ENCOUNTER — PATIENT MESSAGE (OUTPATIENT)
Dept: RESEARCH | Facility: HOSPITAL | Age: 79
End: 2024-02-07
Payer: MEDICARE

## 2024-02-07 ENCOUNTER — DOCUMENTATION ONLY (OUTPATIENT)
Dept: HEMATOLOGY/ONCOLOGY | Facility: CLINIC | Age: 79
End: 2024-02-07
Payer: MEDICARE

## 2024-02-07 NOTE — PROGRESS NOTES
Received request from the pharmacy that the patient was in need of assistance with affording their prescription for Temodar. They have exhausted funding from [employer]. Copay is $124. 20. Cost transfer form has been completed and sent to speciality pharmacy.

## 2024-02-09 ENCOUNTER — TELEPHONE (OUTPATIENT)
Dept: NEUROLOGY | Facility: CLINIC | Age: 79
End: 2024-02-09
Payer: MEDICARE

## 2024-02-09 NOTE — TELEPHONE ENCOUNTER
S/W Pt regarding refill request and reschedule request.    Informed Pt of refills available at [facility]. Phone number for pharmacy provided. Instructed to call with any issues with obtaining medication.    Pt virtual visit rescheduled to 2/28 at 2:30 pm.

## 2024-02-09 NOTE — TELEPHONE ENCOUNTER
----- Message from Soledad Mohan sent at 2/9/2024  1:56 PM CST -----  Regarding: Refill and Reschedule virtual visit  Contact: [phone]  Patient called requesting a refill oncarbidopa-levodopa  mg (SINEMET)  mg per tablet  medication. He also needs to reschedule virtual visit on 02-29-24 due to something coming up. I did not see any availability in [employer] to schedule the patient. Please contact patient as soon as possible.     [id number] - [place] [address] [address] [facility] [address] & [email or web address] [address] [place], [place] [address] [email or web address] [address] [place] [id number]  Phone: [phone] Fax: [phone]

## 2024-02-20 ENCOUNTER — HOSPITAL ENCOUNTER (OUTPATIENT)
Dept: RADIOLOGY | Facility: HOSPITAL | Age: 79
Discharge: HOME | End: 2024-02-20
Attending: NEUROLOGICAL SURGERY
Payer: MEDICARE

## 2024-02-20 DIAGNOSIS — C71.9 GLIOBLASTOMA MULTIFORME OF BRAIN: ICD-10-CM

## 2024-02-20 PROCEDURE — 70553 MRI BRAIN STEM W/O & W/DYE: CPT | Mod: TC,PO

## 2024-02-20 PROCEDURE — 25500020 PHARM REV CODE 255: Mod: PO | Performed by: NEUROLOGICAL SURGERY

## 2024-02-20 PROCEDURE — A9585 GADOBUTROL INJECTION: HCPCS | Mod: PO | Performed by: NEUROLOGICAL SURGERY

## 2024-02-20 PROCEDURE — 70553 MRI BRAIN STEM W/O & W/DYE: CPT | Mod: 26,,, | Performed by: RADIOLOGY

## 2024-02-20 RX ORDER — GADOBUTROL 604.72 MG/ML
7 INJECTION INTRAVENOUS
Status: COMPLETED | OUTPATIENT
Start: 2024-02-20 | End: 2024-02-20

## 2024-02-20 RX ADMIN — GADOBUTROL 7 ML: 604.72 INJECTION INTRAVENOUS at 01:02

## 2024-02-21 ENCOUNTER — OFFICE VISIT (OUTPATIENT)
Dept: NEUROSURGERY | Facility: CLINIC | Age: 79
End: 2024-02-21
Payer: MEDICARE

## 2024-02-21 VITALS
WEIGHT: 162.69 LBS | DIASTOLIC BLOOD PRESSURE: 87 MMHG | HEART RATE: 50 BPM | BODY MASS INDEX: 22.03 KG/M2 | SYSTOLIC BLOOD PRESSURE: 147 MMHG | HEIGHT: 72 IN | RESPIRATION RATE: 18 BRPM

## 2024-02-21 DIAGNOSIS — C71.9 GLIOBLASTOMA MULTIFORME OF BRAIN: Primary | ICD-10-CM

## 2024-02-21 PROCEDURE — 1101F PT FALLS ASSESS-DOCD LE1/YR: CPT | Mod: CPTII,S$GLB,, | Performed by: NEUROLOGICAL SURGERY

## 2024-02-21 PROCEDURE — 3288F FALL RISK ASSESSMENT DOCD: CPT | Mod: CPTII,S$GLB,, | Performed by: NEUROLOGICAL SURGERY

## 2024-02-21 PROCEDURE — 1126F AMNT PAIN NOTED NONE PRSNT: CPT | Mod: CPTII,S$GLB,, | Performed by: NEUROLOGICAL SURGERY

## 2024-02-21 PROCEDURE — 3079F DIAST BP 80-89 MM HG: CPT | Mod: CPTII,S$GLB,, | Performed by: NEUROLOGICAL SURGERY

## 2024-02-21 PROCEDURE — 99215 OFFICE O/P EST HI 40 MIN: CPT | Mod: S$GLB,,, | Performed by: NEUROLOGICAL SURGERY

## 2024-02-21 PROCEDURE — 1159F MED LIST DOCD IN RCRD: CPT | Mod: CPTII,S$GLB,, | Performed by: NEUROLOGICAL SURGERY

## 2024-02-21 PROCEDURE — 3077F SYST BP >= 140 MM HG: CPT | Mod: CPTII,S$GLB,, | Performed by: NEUROLOGICAL SURGERY

## 2024-02-21 RX ORDER — CHOLECALCIFEROL (VITAMIN D3) 125 MCG
CAPSULE ORAL
COMMUNITY

## 2024-02-21 NOTE — PROGRESS NOTES
Neurosurgery History & Physical    Patient ID: Chacorta Gómez is a 78 y.o. male.    No chief complaint on file.    Interval HPI 2/21/2024:  Mr. Gómez is a 78 year old male who returns for routine repeat MRI of the brain with and without contrast.    Interval HPI 1/9/2024:  Mr. Gómez returns for repeat MRI brain with and without contrast.  He does continue to deal with tremors since surgery.       Interval HPI 10/11/2023:  Mr. Gómez is now status post his initial round of temozolomide and fractionated radiation therapy.  He overall is doing well.  He denies new focal neurologic deficit.  Does continue to have a right upper extremity tremor which has been present since surgery.  He does report that he feels somewhat off balance and deconditioned with his walking.     He also describes significant edema in his bilateral lower extremities which has been present since before surgery.  He states that he was started on a diuretic medication by his primary care physician but this has not cure the issue.     They have an appointment with Dr. Macias tomorrow.     He returns for routine surveillance MRI of the brain with and without contrast.     HPI 8/9/2023:  Mr. Gómez is a 78-year-old male status post right frontal craniotomy and resection of glioblastoma on 06/13/2023.  He had a satellite lesion in the inferior right frontal lobe which was not resected given its remote location.  Therefore, his diagnosis is multifocal glioblastoma.     He has started Temodar therapy with Dr. Macias and radiation therapy with Dr. Lisa.       Since surgery he has been doing well with improving activity level.    Review of Systems  Denies fevers chills nausea or vomiting    Past Medical History:   Diagnosis Date    Benign localized hyperplasia of prostate with urinary obstruction and other lower urinary tract symptoms (LUTS)(600.21)     Brain lesion     CVA (cere[facility] vascular accident)     [facility]    Essential  hypertension 07/12/2016    Glaucoma     bilat    Multinodular goiter     Seizures     Spontaneous ecchymoses     Unspecified essential hypertension      Social History     Socioeconomic History    Marital status:      Spouse name: Uyen    Number of children: 0   Tobacco Use    Smoking status: Never     Passive exposure: Never    Smokeless tobacco: Never   Substance and Sexual Activity    Alcohol use: Not Currently     Alcohol/week: 14.0 standard drinks of alcohol     Types: 7 Glasses of wine, 7 Cans of beer per week    Drug use: Never    Sexual activity: Yes     Partners: Female     Social Determinants of Health     Financial Resource Strain: Low Risk  (1/24/2024)    Overall Financial Resource Strain (CARDIA)     Difficulty of Paying Living Expenses: Not very hard   Food Insecurity: No Food Insecurity (1/24/2024)    Hunger Vital Sign     Worried About Running Out of Food in the Last Year: Never true     Ran Out of Food in the Last Year: Never true   Transportation Needs: No Transportation Needs (1/24/2024)    PRAPARE - Transportation     Lack of Transportation (Medical): No     Lack of Transportation (Non-Medical): No   Physical Activity: Inactive (1/24/2024)    Exercise Vital Sign     Days of Exercise per Week: 0 days     Minutes of Exercise per Session: 0 min   Stress: No Stress Concern Present (1/24/2024)    [employer] - Occupational Stress Questionnaire     Feeling of Stress : Only a little   Social Connections: Socially Integrated (1/24/2024)    Social Connection and Isolation Panel [NHANES]     Frequency of Communication with Friends and Family: More than three times a week     Frequency of Social Gatherings with Friends and Family: Twice a week     Attends Taoism Services: More than 4 times per year     Active Member of Clubs or Organizations: Yes     Attends Club or Organization Meetings: More than 4 times per year     Marital Status:    Housing Stability:  Low Risk  (1/24/2024)    Housing Stability Vital Sign     Unable to Pay for Housing in the Last Year: No     Number of Places Lived in the Last Year: 1     Unstable Housing in the Last Year: No     Family History   Problem Relation Age of Onset    Heart attack Mother     Hypertension Mother     Cancer Father         prostate     Hypertension Brother     Stroke Brother         mild      Review of patient's allergies indicates:  No Known Allergies    Current Outpatient Medications:     carbidopa-levodopa  mg (SINEMET)  mg per tablet, Take 1 tablet by mouth 3 (three) times daily., Disp: 90 tablet, Rfl: 11    chlorpheniramine (CHLOR-TRIMETON) 4 mg tablet, Take 4 mg by mouth daily as needed (sneezing attacks)., Disp: , Rfl:     divalproex (DEPAKOTE) 500 MG TbEC, Take 2 tablets (1,000 mg total) by mouth every 12 (twelve) hours., Disp: 120 tablet, Rfl: 2    dorzolamide-timolol 2-0.5% (COSOPT) 22.3-6.8 mg/mL ophthalmic solution, Place 1 drop into both eyes 2 (two) times daily., Disp: , Rfl:     famotidine (PEPCID) 20 MG tablet, Take 20 mg by mouth daily as needed for Heartburn., Disp: , Rfl:     folic acid (FOLVITE) 1 MG tablet, Take 1 tablet (1 mg total) by mouth once daily., Disp: 90 tablet, Rfl: 3    furosemide (LASIX) 20 MG tablet, Take 1 tablet (20 mg total) by mouth daily as needed (swelling)., Disp: 30 tablet, Rfl: 0    hydroCHLOROthiazide (HYDRODIURIL) 12.5 MG Tab, Take 1 tablet (12.5 mg total) by mouth once daily., Disp: 90 tablet, Rfl: 3    latanoprost 0.005 % ophthalmic solution, Place 1 drop into both eyes every evening., Disp: , Rfl:     loratadine (CLARITIN) 10 mg tablet, Take 10 mg by mouth once daily., Disp: , Rfl:     multivitamin Tab, Take 1 tablet by mouth once daily., Disp: 30 tablet, Rfl: 0    mupirocin (BACTROBAN) 2 % ointment, Apply topically 2 (two) times daily. To areas of abrasion/irritation on face/around lips-nose/left ear, Disp: 15 g, Rfl: 0    omega 3-dha-epa-fish oil (FISH OIL)  1,000 mg (120 mg-180 mg) Cap, Take 1 capsule by mouth Daily., Disp: , Rfl:     ondansetron (ZOFRAN) 8 MG tablet, Take 8mg (1 tab) 30-45 minutes prior to chemotherapy dose and every 8 hours as needed, Disp: 30 tablet, Rfl: 3    temozolomide (TEMODAR) 140 MG capsule, Take 1 capsule (140 mg total) by mouth once daily Days 1-5 of a 28 day cycle with 1 other temozolomide prescription for 390 mg total., Disp: 5 capsule, Rfl: 0    temozolomide (TEMODAR) 250 MG capsule, Take 1 capsule (250 mg total) by mouth once daily Days 1-5 of a 28 day cycle with 1 other temozolomide prescription for 390 mg total., Disp: 5 capsule, Rfl: 0    thiamine 100 MG tablet, Take 100 mg by mouth once daily., Disp: , Rfl:   No current facility-administered medications for this visit.  There were no vitals taken for this visit.      Neurologic Exam  Alert and oriented x4   Strength is 5/5 in the bilateral upper and lower extremities   Resting tremor in the right upper extremity which increases with intention  Sensation is grossly intact to light touch  Wound is well healed    Imaging:  MRI of the brain with and without contrast dated 02/20/2024 is personally reviewed and discussed with the patient.  The FLAIR changes in the right frontal lobe as well as the areas of enhancement appear unchanged.  The inferior frontal lobe enhancing area appears slightly smaller than prior MRI.  Overall there is no evidence of tumor progression or recurrence on this imaging.    Assessment/Plan:   Mr. Gómez is a 78-year-old gentleman with multifocal glioblastoma now approximately 8 months status post resection of the dominant lesion.     He is continuing to follow with Dr. Macias  No role for repeat surgical intervention at this time.  We will follow up in 2 months with a repeat MRI of the brain with and without contrast.

## 2024-02-28 ENCOUNTER — LAB VISIT (OUTPATIENT)
Dept: LAB | Facility: HOSPITAL | Age: 79
End: 2024-02-28
Attending: PSYCHIATRY & NEUROLOGY
Payer: MEDICARE

## 2024-02-28 ENCOUNTER — OFFICE VISIT (OUTPATIENT)
Dept: NEUROLOGY | Facility: CLINIC | Age: 79
End: 2024-02-28
Payer: MEDICARE

## 2024-02-28 DIAGNOSIS — C71.9 GLIOBLASTOMA DETERMINED BY BIOPSY OF BRAIN: ICD-10-CM

## 2024-02-28 DIAGNOSIS — R25.1 TREMOR: ICD-10-CM

## 2024-02-28 DIAGNOSIS — R56.9 SEIZURE: ICD-10-CM

## 2024-02-28 DIAGNOSIS — G20.C PARKINSONISM, UNSPECIFIED PARKINSONISM TYPE: ICD-10-CM

## 2024-02-28 DIAGNOSIS — C71.9 GLIOBLASTOMA DETERMINED BY BIOPSY OF BRAIN: Primary | ICD-10-CM

## 2024-02-28 LAB
ALBUMIN SERPL BCP-MCNC: 3.5 G/DL (ref 3.5–5.2)
ALP SERPL-CCNC: 33 U/L (ref 55–135)
ALT SERPL W/O P-5'-P-CCNC: 5 U/L (ref 10–44)
ANION GAP SERPL CALC-SCNC: 7 MMOL/L (ref 8–16)
AST SERPL-CCNC: 25 U/L (ref 10–40)
BASOPHILS # BLD AUTO: 0.02 K/UL (ref 0–0.2)
BASOPHILS NFR BLD: 0.4 % (ref 0–1.9)
BILIRUB SERPL-MCNC: 0.5 MG/DL (ref 0.1–1)
BUN SERPL-MCNC: 21 MG/DL (ref 8–23)
CALCIUM SERPL-MCNC: 9.6 MG/DL (ref 8.7–10.5)
CHLORIDE SERPL-SCNC: 104 MMOL/L (ref 95–110)
CO2 SERPL-SCNC: 31 MMOL/L (ref 23–29)
CREAT SERPL-MCNC: 0.7 MG/DL (ref 0.5–1.4)
DIFFERENTIAL METHOD BLD: ABNORMAL
EOSINOPHIL # BLD AUTO: 0.1 K/UL (ref 0–0.5)
EOSINOPHIL NFR BLD: 2.9 % (ref 0–8)
ERYTHROCYTE [DISTWIDTH] IN BLOOD BY AUTOMATED COUNT: 16.2 % (ref 11.5–14.5)
EST. GFR  (NO RACE VARIABLE): >60 ML/MIN/1.73 M^2
GLUCOSE SERPL-MCNC: 78 MG/DL (ref 70–110)
HCT VFR BLD AUTO: 38.6 % (ref 40–54)
HGB BLD-MCNC: 12.9 G/DL (ref 14–18)
IMM GRANULOCYTES # BLD AUTO: 0.02 K/UL (ref 0–0.04)
IMM GRANULOCYTES NFR BLD AUTO: 0.4 % (ref 0–0.5)
LYMPHOCYTES # BLD AUTO: 1.8 K/UL (ref 1–4.8)
LYMPHOCYTES NFR BLD: 37.2 % (ref 18–48)
MCH RBC QN AUTO: 33.3 PG (ref 27–31)
MCHC RBC AUTO-ENTMCNC: 33.4 G/DL (ref 32–36)
MCV RBC AUTO: 100 FL (ref 82–98)
MONOCYTES # BLD AUTO: 0.9 K/UL (ref 0.3–1)
MONOCYTES NFR BLD: 18.8 % (ref 4–15)
NEUTROPHILS # BLD AUTO: 1.9 K/UL (ref 1.8–7.7)
NEUTROPHILS NFR BLD: 40.3 % (ref 38–73)
NRBC BLD-RTO: 0 /100 WBC
PLATELET # BLD AUTO: 151 K/UL (ref 150–450)
PMV BLD AUTO: 10.3 FL (ref 9.2–12.9)
POTASSIUM SERPL-SCNC: 3.8 MMOL/L (ref 3.5–5.1)
PROT SERPL-MCNC: 6.1 G/DL (ref 6–8.4)
RBC # BLD AUTO: 3.87 M/UL (ref 4.6–6.2)
SODIUM SERPL-SCNC: 142 MMOL/L (ref 136–145)
WBC # BLD AUTO: 4.79 K/UL (ref 3.9–12.7)

## 2024-02-28 PROCEDURE — 85025 COMPLETE CBC W/AUTO DIFF WBC: CPT | Performed by: PSYCHIATRY & NEUROLOGY

## 2024-02-28 PROCEDURE — 36415 COLL VENOUS BLD VENIPUNCTURE: CPT | Mod: PO | Performed by: PSYCHIATRY & NEUROLOGY

## 2024-02-28 PROCEDURE — G2211 COMPLEX E/M VISIT ADD ON: HCPCS | Mod: 95,,, | Performed by: PSYCHIATRY & NEUROLOGY

## 2024-02-28 PROCEDURE — 80053 COMPREHEN METABOLIC PANEL: CPT | Performed by: PSYCHIATRY & NEUROLOGY

## 2024-02-28 PROCEDURE — 99215 OFFICE O/P EST HI 40 MIN: CPT | Mod: 95,,, | Performed by: PSYCHIATRY & NEUROLOGY

## 2024-02-28 NOTE — PROGRESS NOTES
Subjective:       Patient ID: Chacorta Gómez is a 78 y.o. male.    Chief Complaint: glioblastoma    HPI  Oncologic History:   2/2023: admitted to outside hospital for new onset seizure, he was started on keppra. MRI brain was done and was reportedly suspicious for subacute infarct.   4-5/2023: was in [place] for a work trip, had recurrent seizures, was hospitalized there for 10 days.   6/2023: hospitalized for breakthrough seizures, lacosamide was added, underwent repeat MRI brain which showed 4.2 x 2.6 x 4.1 cm heterogeneous enhancing hemorrhagic medial right frontal lesion. A 2nd lesion along the planum sphenoidale has a dural base but appears to also involve the inferior frontal lobe parenchyma. Patient was seen by neurosurgery and underwent subtotal resection. Pathology consistent with GBM.   8/8-28/2023: chemoradiation to 40Gy over 15fx (Alexa Lisa)  10/2023: C1 TMZ @150mg/m2  12/2023: C2 TMZ @200mg/m2  2/7-11/2024: C3 TMZ @200mg/m2    Chacorta is feeling ok. He tolerated the most recent cycle of TMZ well. He has persistent tremor that is bothersome.    Past Medical History:   Diagnosis Date    Benign localized hyperplasia of prostate with urinary obstruction and other lower urinary tract symptoms (LUTS)(600.21)     Brain lesion     CVA (cerebral vascular accident)     [facility]    Essential hypertension 07/12/2016    Glaucoma     bilat    Multinodular goiter     Seizures     Spontaneous ecchymoses     Unspecified essential hypertension       Current Outpatient Medications   Medication Sig Dispense Refill    carbidopa-levodopa  mg (SINEMET)  mg per tablet Take 1 tablet by mouth 3 (three) times daily. 90 tablet 11    chlorpheniramine (CHLOR-TRIMETON) 4 mg tablet Take 4 mg by mouth daily as needed (sneezing attacks).      divalproex (DEPAKOTE) 500 MG TbEC Take 2 tablets (1,000 mg total) by mouth every 12 (twelve) hours. 120 tablet 2    dorzolamide-timolol 2-0.5% (COSOPT) 22.3-6.8 mg/mL  ophthalmic solution Place 1 drop into both eyes 2 (two) times daily.      famotidine (PEPCID) 20 MG tablet Take 20 mg by mouth daily as needed for Heartburn.      folic acid (FOLVITE) 1 MG tablet Take 1 tablet (1 mg total) by mouth once daily. 90 tablet 3    furosemide (LASIX) 20 MG tablet Take 1 tablet (20 mg total) by mouth daily as needed (swelling). 30 tablet 0    hydroCHLOROthiazide (HYDRODIURIL) 12.5 MG Tab Take 1 tablet (12.5 mg total) by mouth once daily. 90 tablet 3    latanoprost 0.005 % ophthalmic solution Place 1 drop into both eyes every evening.      loratadine (CLARITIN) 10 mg tablet Take 10 mg by mouth once daily.      multivitamin Tab Take 1 tablet by mouth once daily. 30 tablet 0    mupirocin (BACTROBAN) 2 % ointment Apply topically 2 (two) times daily. To areas of abrasion/irritation on face/around lips-nose/left ear (Patient not taking: Reported on 2/21/2024) 15 g 0    naproxen sodium (ALEVE) 220 mg Cap Take by mouth.      omega 3-dha-epa-fish oil (FISH OIL) 1,000 mg (120 mg-180 mg) Cap Take 1 capsule by mouth Daily.      ondansetron (ZOFRAN) 8 MG tablet Take 8mg (1 tab) 30-45 minutes prior to chemotherapy dose and every 8 hours as needed 30 tablet 3    temozolomide (TEMODAR) 140 MG capsule Take 1 capsule (140 mg total) by mouth once daily Days 1-5 of a 28 day cycle with 1 other temozolomide prescription for 390 mg total. 5 capsule 0    temozolomide (TEMODAR) 250 MG capsule Take 1 capsule (250 mg total) by mouth once daily Days 1-5 of a 28 day cycle with 1 other temozolomide prescription for 390 mg total. 5 capsule 0    thiamine 100 MG tablet Take 100 mg by mouth once daily.       No current facility-administered medications for this visit.      Past Surgical History:   Procedure Laterality Date    COLONOSCOPY  08/03/2009    Dr. Askew    CRANIOTOMY FOR EXCISION OF INTRACRANIAL TUMOR      CRANIOTOMY, WITH NEOPLASM EXCISION USING COMPUTER-ASSISTED NAVIGATION Right 6/13/2023    Procedure:  CRANIOTOMY, WITH NEOPLASM EXCISION USING COMPUTER-ASSISTED NAVIGATION -     RIGHT FRONTAL;  Surgeon: Joao English MD;  Location: [facility] OR;  Service: Neurosurgery;  Laterality: Right;    NASAL SEPTUM SURGERY      TONSILLECTOMY        Family History   Problem Relation Age of Onset    Heart attack Mother     Hypertension Mother     Cancer Father         prostate     Hypertension Brother     Stroke Brother         mild       Social History     Tobacco Use    Smoking status: Never     Passive exposure: Never    Smokeless tobacco: Never   Substance Use Topics    Alcohol use: Not Currently     Alcohol/week: 14.0 standard drinks of alcohol     Types: 7 Glasses of wine, 7 Cans of beer per week    Drug use: Never      Review of Systems  KPS: 80        Objective:      There were no vitals filed for this visit.        NEUROLOGICAL EXAMINATION:     MENTAL STATUS   Attention: normal. Concentration: normal.   Speech: speech is normal   Level of consciousness: alert    CRANIAL NERVES   Cranial nerves II through XII intact.     MOTOR EXAM   Muscle bulk: normal  Right arm tone: increased    Strength   Strength 5/5 throughout.        Rigidity and bradykinesia R>L  Medium-frequency, low to medium-amplitude tremor of the right hand>left, more at rest than with posture or action     SENSORY EXAM   Light touch normal.     GAIT AND COORDINATION      Coordination   Finger to nose coordination: normal       Stooped posture with mild shuffling. En bloc, multistep turn.        MRI brain from 12/12/2023 was personally visualized and compared to prior.  1. There may be slight decrease in the amount of FLAIR hyperintense signal associated with the inferior right frontal enhancing mass.  Also, transverse measurement of this irregular enhancing mass has minimally decreased but now the enhancement extends to the level of the right frontal horn.  Previously, there is no enhancement actually touching the lateral ventricle but there was  nonspecific and nonenhancing FLAIR signal abnormality, possibly representing nonenhancing tumor or treatment change.  2. There is no significant or detrimental change in the high medial right frontal resection cavity where there is persistent nodular enhancement without interval progression.    Assessment:       Problem List Items Addressed This Visit          Oncology    Glioblastoma determined by biopsy of brain - Primary    Relevant Orders    CBC W/ AUTO DIFFERENTIAL     Other Visit Diagnoses       Parkinsonism, unspecified Parkinsonism type        Tremor        Seizure                          Plan:       Chacorta Gómez is a 78 y.o. male with a glioblastoma, MGMT-methylated, IDH-wt, WHO grade 4 s/p subtotal resection and chemoradiation to 40Gy over 15fx presenting for treatment recommendations. Tumor diagnosis was heralded by seizure.     Glioblastoma  Chacorta is on C3D21. He will repeat CBC in 1 week; if adequate, will prescribe C4 @200mg/m2. His most recent MRI brain is stable. He will f/u in 5 weeks with CBC/CMP in anticipation of C5 TMZ.    Tremor/parkinsonism  Pill rolling tremor, bradykinesia, and rigidity R>L along with stooped posture and shuffling consistent with parkinsonism. It is odd that he and wife feel this started suddenly after his surgery as he is at least a H&Y stage 2. Symptoms mildly improved on Sinemet 25-100mg 1.5 tab tid. Advised taking the medication at 8-9AM (upon awakening), noon, and 4pm, rather than morning/mid-day/bedtime to maximize coverage during the day. If this is not adequate, can increase dose and/or add Artane.    Seizures  Depakote 1000mg bid. Stopped Vimpat inadvertently; unclear when, but has not had additional seizures. CTM.            The patient location is: home in [place]  The chief complaint leading to consultation is: glioblastoma    Visit type: audiovisual    Face to Face time with patient: 7 min      Visit today included increased complexity associated with the care of  the episodic problem seizures, glioblastoma, tremor/parkinsonism addressed and managing the longitudinal care of the patient due to the serious and/or complex managed problem(s) glioblastoma/seizures/tremor/parkinsonism.     Lucita Macias MD  Department of Neurology  Neuro-Oncology, Movement Disorders

## 2024-03-06 ENCOUNTER — LAB VISIT (OUTPATIENT)
Dept: LAB | Facility: HOSPITAL | Age: 79
End: 2024-03-06
Attending: PSYCHIATRY & NEUROLOGY
Payer: MEDICARE

## 2024-03-06 DIAGNOSIS — C71.9 GLIOBLASTOMA DETERMINED BY BIOPSY OF BRAIN: ICD-10-CM

## 2024-03-06 LAB
ALBUMIN SERPL BCP-MCNC: 3.7 G/DL (ref 3.5–5.2)
ALP SERPL-CCNC: 38 U/L (ref 55–135)
ALT SERPL W/O P-5'-P-CCNC: 6 U/L (ref 10–44)
ANION GAP SERPL CALC-SCNC: 11 MMOL/L (ref 8–16)
AST SERPL-CCNC: 27 U/L (ref 10–40)
BASOPHILS # BLD AUTO: 0.02 K/UL (ref 0–0.2)
BASOPHILS NFR BLD: 0.4 % (ref 0–1.9)
BILIRUB SERPL-MCNC: 0.5 MG/DL (ref 0.1–1)
BUN SERPL-MCNC: 18 MG/DL (ref 8–23)
CALCIUM SERPL-MCNC: 9.8 MG/DL (ref 8.7–10.5)
CHLORIDE SERPL-SCNC: 105 MMOL/L (ref 95–110)
CO2 SERPL-SCNC: 26 MMOL/L (ref 23–29)
CREAT SERPL-MCNC: 0.7 MG/DL (ref 0.5–1.4)
DIFFERENTIAL METHOD BLD: ABNORMAL
EOSINOPHIL # BLD AUTO: 0.1 K/UL (ref 0–0.5)
EOSINOPHIL NFR BLD: 1.6 % (ref 0–8)
ERYTHROCYTE [DISTWIDTH] IN BLOOD BY AUTOMATED COUNT: 16.6 % (ref 11.5–14.5)
EST. GFR  (NO RACE VARIABLE): >60 ML/MIN/1.73 M^2
GLUCOSE SERPL-MCNC: 87 MG/DL (ref 70–110)
HCT VFR BLD AUTO: 38.5 % (ref 40–54)
HGB BLD-MCNC: 12.6 G/DL (ref 14–18)
IMM GRANULOCYTES # BLD AUTO: 0.02 K/UL (ref 0–0.04)
IMM GRANULOCYTES NFR BLD AUTO: 0.4 % (ref 0–0.5)
LYMPHOCYTES # BLD AUTO: 1.8 K/UL (ref 1–4.8)
LYMPHOCYTES NFR BLD: 33.4 % (ref 18–48)
MCH RBC QN AUTO: 32.7 PG (ref 27–31)
MCHC RBC AUTO-ENTMCNC: 32.7 G/DL (ref 32–36)
MCV RBC AUTO: 100 FL (ref 82–98)
MONOCYTES # BLD AUTO: 0.7 K/UL (ref 0.3–1)
MONOCYTES NFR BLD: 13 % (ref 4–15)
NEUTROPHILS # BLD AUTO: 2.8 K/UL (ref 1.8–7.7)
NEUTROPHILS NFR BLD: 51.2 % (ref 38–73)
NRBC BLD-RTO: 0 /100 WBC
PLATELET # BLD AUTO: 75 K/UL (ref 150–450)
PMV BLD AUTO: 10.6 FL (ref 9.2–12.9)
POTASSIUM SERPL-SCNC: 4.1 MMOL/L (ref 3.5–5.1)
PROT SERPL-MCNC: 6.5 G/DL (ref 6–8.4)
RBC # BLD AUTO: 3.85 M/UL (ref 4.6–6.2)
SODIUM SERPL-SCNC: 142 MMOL/L (ref 136–145)
WBC # BLD AUTO: 5.48 K/UL (ref 3.9–12.7)

## 2024-03-06 PROCEDURE — 85025 COMPLETE CBC W/AUTO DIFF WBC: CPT | Performed by: PSYCHIATRY & NEUROLOGY

## 2024-03-06 PROCEDURE — 80053 COMPREHEN METABOLIC PANEL: CPT | Performed by: PSYCHIATRY & NEUROLOGY

## 2024-03-06 PROCEDURE — 36415 COLL VENOUS BLD VENIPUNCTURE: CPT | Mod: PO | Performed by: PSYCHIATRY & NEUROLOGY

## 2024-03-13 ENCOUNTER — LAB VISIT (OUTPATIENT)
Dept: LAB | Facility: HOSPITAL | Age: 79
End: 2024-03-13
Attending: PSYCHIATRY & NEUROLOGY
Payer: MEDICARE

## 2024-03-13 DIAGNOSIS — C71.9 GLIOBLASTOMA DETERMINED BY BIOPSY OF BRAIN: ICD-10-CM

## 2024-03-13 LAB
BASOPHILS # BLD AUTO: 0.01 K/UL (ref 0–0.2)
BASOPHILS NFR BLD: 0.2 % (ref 0–1.9)
DIFFERENTIAL METHOD BLD: ABNORMAL
EOSINOPHIL # BLD AUTO: 0.3 K/UL (ref 0–0.5)
EOSINOPHIL NFR BLD: 6 % (ref 0–8)
ERYTHROCYTE [DISTWIDTH] IN BLOOD BY AUTOMATED COUNT: 16.6 % (ref 11.5–14.5)
HCT VFR BLD AUTO: 36.2 % (ref 40–54)
HGB BLD-MCNC: 12.3 G/DL (ref 14–18)
IMM GRANULOCYTES # BLD AUTO: 0.02 K/UL (ref 0–0.04)
IMM GRANULOCYTES NFR BLD AUTO: 0.4 % (ref 0–0.5)
LYMPHOCYTES # BLD AUTO: 1.7 K/UL (ref 1–4.8)
LYMPHOCYTES NFR BLD: 31.3 % (ref 18–48)
MCH RBC QN AUTO: 33.9 PG (ref 27–31)
MCHC RBC AUTO-ENTMCNC: 34 G/DL (ref 32–36)
MCV RBC AUTO: 100 FL (ref 82–98)
MONOCYTES # BLD AUTO: 0.7 K/UL (ref 0.3–1)
MONOCYTES NFR BLD: 13.1 % (ref 4–15)
NEUTROPHILS # BLD AUTO: 2.6 K/UL (ref 1.8–7.7)
NEUTROPHILS NFR BLD: 49 % (ref 38–73)
NRBC BLD-RTO: 0 /100 WBC
PLATELET # BLD AUTO: 150 K/UL (ref 150–450)
PMV BLD AUTO: 10 FL (ref 9.2–12.9)
RBC # BLD AUTO: 3.63 M/UL (ref 4.6–6.2)
WBC # BLD AUTO: 5.33 K/UL (ref 3.9–12.7)

## 2024-03-13 PROCEDURE — 36415 COLL VENOUS BLD VENIPUNCTURE: CPT | Mod: PO | Performed by: PSYCHIATRY & NEUROLOGY

## 2024-03-13 PROCEDURE — 85025 COMPLETE CBC W/AUTO DIFF WBC: CPT | Performed by: PSYCHIATRY & NEUROLOGY

## 2024-03-14 ENCOUNTER — OFFICE VISIT (OUTPATIENT)
Dept: PALLIATIVE MEDICINE | Facility: CLINIC | Age: 79
End: 2024-03-14
Payer: MEDICARE

## 2024-03-14 VITALS
DIASTOLIC BLOOD PRESSURE: 84 MMHG | HEART RATE: 60 BPM | TEMPERATURE: 97 F | OXYGEN SATURATION: 98 % | BODY MASS INDEX: 22.16 KG/M2 | SYSTOLIC BLOOD PRESSURE: 131 MMHG | HEIGHT: 71 IN | WEIGHT: 158.31 LBS

## 2024-03-14 DIAGNOSIS — R53.81 DEBILITY: ICD-10-CM

## 2024-03-14 DIAGNOSIS — C71.9 GLIOBLASTOMA DETERMINED BY BIOPSY OF BRAIN: ICD-10-CM

## 2024-03-14 DIAGNOSIS — C71.9 GLIOBLASTOMA DETERMINED BY BIOPSY OF BRAIN: Primary | ICD-10-CM

## 2024-03-14 PROCEDURE — 99205 OFFICE O/P NEW HI 60 MIN: CPT | Mod: S$GLB,,, | Performed by: STUDENT IN AN ORGANIZED HEALTH CARE EDUCATION/TRAINING PROGRAM

## 2024-03-14 PROCEDURE — 3075F SYST BP GE 130 - 139MM HG: CPT | Mod: CPTII,S$GLB,, | Performed by: STUDENT IN AN ORGANIZED HEALTH CARE EDUCATION/TRAINING PROGRAM

## 2024-03-14 PROCEDURE — 1159F MED LIST DOCD IN RCRD: CPT | Mod: CPTII,S$GLB,, | Performed by: STUDENT IN AN ORGANIZED HEALTH CARE EDUCATION/TRAINING PROGRAM

## 2024-03-14 PROCEDURE — 99497 ADVNCD CARE PLAN 30 MIN: CPT | Mod: S$GLB,,, | Performed by: STUDENT IN AN ORGANIZED HEALTH CARE EDUCATION/TRAINING PROGRAM

## 2024-03-14 PROCEDURE — 99999 PR PBB SHADOW E&M-EST. PATIENT-LVL IV: CPT | Mod: PBBFAC,,, | Performed by: STUDENT IN AN ORGANIZED HEALTH CARE EDUCATION/TRAINING PROGRAM

## 2024-03-14 PROCEDURE — 3079F DIAST BP 80-89 MM HG: CPT | Mod: CPTII,S$GLB,, | Performed by: STUDENT IN AN ORGANIZED HEALTH CARE EDUCATION/TRAINING PROGRAM

## 2024-03-14 RX ORDER — HYDROCODONE BITARTRATE AND ACETAMINOPHEN 5; 325 MG/1; MG/1
1 TABLET ORAL EVERY 6 HOURS PRN
Qty: 120 TABLET | Refills: 0 | Status: SHIPPED | OUTPATIENT
Start: 2024-03-14

## 2024-03-14 RX ORDER — TEMOZOLOMIDE 250 MG/1
250 CAPSULE ORAL DAILY
Qty: 5 CAPSULE | Refills: 0 | Status: ACTIVE | OUTPATIENT
Start: 2024-03-14 | End: 2024-03-27

## 2024-03-14 RX ORDER — TEMOZOLOMIDE 140 MG/1
140 CAPSULE ORAL DAILY
Qty: 5 CAPSULE | Refills: 0 | Status: ACTIVE | OUTPATIENT
Start: 2024-03-14 | End: 2024-04-19

## 2024-03-14 NOTE — PROGRESS NOTES
Palliative Medicine Clinic Note        Consult Requested By: Dr. Lucita Macias      Reason for Consult/Chief Complaint: Glibolastoma multiforme, worsening back pain        ASSESSMENT/PLAN:      Plan/Recommendations:   Diagnoses and all orders for this visit:    Glioblastoma determined by biopsy of brain  -     Ambulatory referral/consult to HOME Palliative Care  -     HYDROcodone-acetaminophen (NORCO) 5-325 mg per tablet; Take 1 tablet by mouth every 6 (six) hours as needed for Pain.    Debility  -     Ambulatory referral/consult to HOME Palliative Care          Assessment & Plan    Glioblastoma Multiforme  - S/p subtotal resection with NSGY and adjuvant radiation therapy in 2023  - Follows with Dr. Macias, on tezolomide  - Repeat MRI q2mo  - Discussed that cancer likely to recur or worsen at some point, though we hope his current period of relatively minimal symptoms continues for quite some time    Cancer associated pain/Degenerative back pain:  - Prescribed a low dose of hydrocodone (Norco) for back pain management, instructing the patient to take it as needed, up to 3 times daily.  - Explained the potential side effects of hydrocodone, including constipation, and advised the use of stool softeners if necessary.  - Emphasized the importance of monitoring pain levels and adjusting the medication accordingly.  - Scheduled a virtual follow-up visit in 1 month to assess the effectiveness of the prescribed medication and address any other concerns.   - May benefit from evaluation for facet arthropathy based on pain descriptor        Advance Care Planning   Advance Directives:   Agent's Name:  Esperanza Gómez   Agent's Contact Number:  [phone]    Decision Making:  Patient answered questions  Goals of Care: The patient endorses that what is most important right now is to focus on remaining as independent as possible and symptom/pain control    Accordingly, we have decided that the best plan to meet the  patient's goals includes continuing with treatment                   Follow up: 1 month     Plan discussed with: RAFA             SUBJECTIVE:          History of Present Illness / Interval History:  Chacorta Gómez is 78 y.o. year old male presenting with Glioblastoma Multiforme s/p subtotal resection with NSGY and adjuvant radiation therapy.  Referred to Palliative Care for physical symptoms, advance care planning,, and additional support..    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 He was hospitalized in [place] for 10 days. After returning to the [place], an MRI revealed a brain tumor, which was diagnosed as glioblastoma. His main concerns are the tremors in his hands, which make it difficult for him to paint and write, and worsening lower back pain. He has tried Parkinson's medication with minimal improvement in the tremors. He has a history of degenerative spine issues in the lower back. He is currently undergoing rehab and has recently started treatment for lymphedema. He denies any chest pain, shortness of breath, or left leg numbness. He denies any history of diabetes, hypertension, or prostate cancer.    GOALS OF CARE/ADVANCED DIRECTIVES:  - He is primarily concerned about his hand tremors and back pain, which are currently inhibiting his ability to paint, a significant source of celeste and purpose in his life.  - His wife is his primary caregiver and the person he would want to make medical decisions for him if he were unable to do so himself.  - He has other relatives who could potentially provide support if needed, but they live in [place], which is a distance away from his home.  - He expressed concern about needing home care in the future,  particularly in relation to managing his medications.    ACTIVITIES OF DAILY LIVING:  - Difficulty painting and writing due to hand tremors  - Lower back pain affecting ability to lift and move things    SOCIAL HISTORY:  -  for 50 years  - Artist, primarily working with pastels, watercolor, and oils  - Taught art at [employer] and various workshops around [place]           ROS:  Review of Systems    Review of Symptoms      Symptom Assessment (ESAS 0-10 Scale)  Pain:  6  Dyspnea:  6  Anxiety:  2  Nausea:  1  Depression:  2  Anorexia:  2  Fatigue:  7  Insomnia:  3  Restlessness:  0  Agitation:  0         Pain Assessment:    Location(s): back    Back       Location: lower and bilateral        Quality: Dull and sharp        Quantity: 6/10 in intensity        Chronicity: Onset 2 year(s) ago, gradually worsening        Aggravating Factors: Movement        Alleviating Factors: Acetaminophen and NSAIDs       Associated Symptoms: None    Performance Status:  70    Living Arrangements:  Lives with spouse    Psychosocial/Cultural:   See Palliative Psychosocial Note: Yes  **Primary  to Follow**  Palliative Care  Consult: Yes        External  database queried on 03/14/2024  by Shon Jett     Medications:    Current Outpatient Medications:     carbidopa-levodopa  mg (SINEMET)  mg per tablet, Take 1 tablet by mouth 3 (three) times daily., Disp: 90 tablet, Rfl: 11    chlorpheniramine (CHLOR-TRIMETON) 4 mg tablet, Take 4 mg by mouth daily as needed (sneezing attacks)., Disp: , Rfl:     divalproex (DEPAKOTE) 500 MG TbEC, Take 2 tablets (1,000 mg total) by mouth every 12 (twelve) hours., Disp: 120 tablet, Rfl: 2    dorzolamide-timolol 2-0.5% (COSOPT) 22.3-6.8 mg/mL ophthalmic solution, Place 1 drop into both eyes 2 (two) times daily., Disp: , Rfl:     famotidine (PEPCID) 20 MG tablet, Take 20 mg by mouth daily as needed for Heartburn., Disp: , Rfl:      folic acid (FOLVITE) 1 MG tablet, Take 1 tablet (1 mg total) by mouth once daily., Disp: 90 tablet, Rfl: 3    furosemide (LASIX) 20 MG tablet, Take 1 tablet (20 mg total) by mouth daily as needed (swelling)., Disp: 30 tablet, Rfl: 0    hydroCHLOROthiazide (HYDRODIURIL) 12.5 MG Tab, Take 1 tablet (12.5 mg total) by mouth once daily., Disp: 90 tablet, Rfl: 3    HYDROcodone-acetaminophen (NORCO) 5-325 mg per tablet, Take 1 tablet by mouth every 6 (six) hours as needed for Pain., Disp: 120 tablet, Rfl: 0    latanoprost 0.005 % ophthalmic solution, Place 1 drop into both eyes every evening., Disp: , Rfl:     loratadine (CLARITIN) 10 mg tablet, Take 10 mg by mouth once daily., Disp: , Rfl:     multivitamin Tab, Take 1 tablet by mouth once daily., Disp: 30 tablet, Rfl: 0    mupirocin (BACTROBAN) 2 % ointment, Apply topically 2 (two) times daily. To areas of abrasion/irritation on face/around lips-nose/left ear (Patient not taking: Reported on 2/21/2024), Disp: 15 g, Rfl: 0    naproxen sodium (ALEVE) 220 mg Cap, Take by mouth., Disp: , Rfl:     omega 3-dha-epa-fish oil (FISH OIL) 1,000 mg (120 mg-180 mg) Cap, Take 1 capsule by mouth Daily., Disp: , Rfl:     ondansetron (ZOFRAN) 8 MG tablet, Take 8mg (1 tab) 30-45 minutes prior to chemotherapy dose and every 8 hours as needed, Disp: 30 tablet, Rfl: 3    temozolomide (TEMODAR) 140 MG capsule, Take 1 capsule (140 mg total) by mouth once daily Days 1-5/28 with 1 other temozolomide prescription for 390 mg total., Disp: 5 capsule, Rfl: 0    temozolomide (TEMODAR) 250 MG capsule, Take 1 capsule (250 mg total) by mouth once daily Days 1-5/28 with 1 other temozolomide prescription for 390 mg total., Disp: 5 capsule, Rfl: 0    thiamine 100 MG tablet, Take 100 mg by mouth once daily., Disp: , Rfl:     Review of patient's allergies indicates:  No Known Allergies        OBJECTIVE:         Physical Exam:  Vitals: Temp: 97.2 °F (36.2 °C) (03/14/24 1421)  Pulse: 60 (03/14/24 1421)  BP:  131/84 (03/14/24 1421)  SpO2: 98 % (03/14/24 1421)  Physical Exam  Constitutional:       General: He is not in acute distress.     Appearance: He is not diaphoretic.   HENT:      Head: Normocephalic and atraumatic.   Eyes:      General: No scleral icterus.     Conjunctiva/sclera: Conjunctivae normal.   Neck:      Thyroid: No thyromegaly.   Pulmonary:      Effort: Pulmonary effort is normal. No respiratory distress.   Abdominal:      General: There is no distension.   Skin:     General: Skin is warm and dry.      Findings: No erythema or rash.   Neurological:      Mental Status: He is alert and oriented to person, place, and time.      Motor: Tremor (pill rolling, R>L) present.      Gait: Gait abnormal (forward gait, shuffling steps).   Psychiatric:         Mood and Affect: Affect normal.         Thought Content: Thought content normal.         Judgment: Judgment normal.   Physical Exam                     I spent a total of 60 minutes on the day of the visit. This includes face to face time in discussion of goals of care, symptom assessment, coordination of care and emotional support.  This also includes non-face to face time preparing to see the patient (eg, review of tests/imaging), obtaining and/or reviewing separately obtained history, documenting clinical information in the electronic or other health record, independently interpreting results and communicating results to the patient/family/caregiver, or care coordinator.       Additional 16 min time spent on a voluntary advance care planning and /or goals of care discussion, providing emotional support, formulating and communicating prognosis and exploring burden/benefit of various approaches of treatment.     This note was generated with the assistance of ambient listening technology. Verbal consent was obtained by the patient and accompanying visitor(s) for the recording of patient appointment to facilitate this note. I attest to having reviewed and edited  the generated note for accuracy, though some syntax or spelling errors may persist. Please contact the author of this note for any clarification.

## 2024-04-03 ENCOUNTER — LAB VISIT (OUTPATIENT)
Dept: LAB | Facility: HOSPITAL | Age: 79
End: 2024-04-03
Attending: PSYCHIATRY & NEUROLOGY
Payer: MEDICARE

## 2024-04-03 ENCOUNTER — TELEPHONE (OUTPATIENT)
Dept: NEUROSURGERY | Facility: CLINIC | Age: 79
End: 2024-04-03
Payer: MEDICARE

## 2024-04-03 DIAGNOSIS — C71.9 GLIOBLASTOMA DETERMINED BY BIOPSY OF BRAIN: ICD-10-CM

## 2024-04-03 LAB
ALBUMIN SERPL BCP-MCNC: 3.9 G/DL (ref 3.5–5.2)
ALP SERPL-CCNC: 34 U/L (ref 55–135)
ALT SERPL W/O P-5'-P-CCNC: 23 U/L (ref 10–44)
ANION GAP SERPL CALC-SCNC: 6 MMOL/L (ref 8–16)
AST SERPL-CCNC: 30 U/L (ref 10–40)
BASOPHILS # BLD AUTO: 0.02 K/UL (ref 0–0.2)
BASOPHILS NFR BLD: 0.5 % (ref 0–1.9)
BILIRUB SERPL-MCNC: 0.5 MG/DL (ref 0.1–1)
BUN SERPL-MCNC: 21 MG/DL (ref 8–23)
CALCIUM SERPL-MCNC: 9.8 MG/DL (ref 8.7–10.5)
CHLORIDE SERPL-SCNC: 105 MMOL/L (ref 95–110)
CO2 SERPL-SCNC: 31 MMOL/L (ref 23–29)
CREAT SERPL-MCNC: 0.8 MG/DL (ref 0.5–1.4)
DIFFERENTIAL METHOD BLD: ABNORMAL
EOSINOPHIL # BLD AUTO: 0.3 K/UL (ref 0–0.5)
EOSINOPHIL NFR BLD: 7.4 % (ref 0–8)
ERYTHROCYTE [DISTWIDTH] IN BLOOD BY AUTOMATED COUNT: 15.2 % (ref 11.5–14.5)
EST. GFR  (NO RACE VARIABLE): >60 ML/MIN/1.73 M^2
GLUCOSE SERPL-MCNC: 78 MG/DL (ref 70–110)
HCT VFR BLD AUTO: 39.5 % (ref 40–54)
HGB BLD-MCNC: 13.5 G/DL (ref 14–18)
IMM GRANULOCYTES # BLD AUTO: 0.02 K/UL (ref 0–0.04)
IMM GRANULOCYTES NFR BLD AUTO: 0.5 % (ref 0–0.5)
LYMPHOCYTES # BLD AUTO: 1.7 K/UL (ref 1–4.8)
LYMPHOCYTES NFR BLD: 38.3 % (ref 18–48)
MCH RBC QN AUTO: 34.1 PG (ref 27–31)
MCHC RBC AUTO-ENTMCNC: 34.2 G/DL (ref 32–36)
MCV RBC AUTO: 100 FL (ref 82–98)
MONOCYTES # BLD AUTO: 0.6 K/UL (ref 0.3–1)
MONOCYTES NFR BLD: 14.4 % (ref 4–15)
NEUTROPHILS # BLD AUTO: 1.7 K/UL (ref 1.8–7.7)
NEUTROPHILS NFR BLD: 38.9 % (ref 38–73)
NRBC BLD-RTO: 0 /100 WBC
PLATELET # BLD AUTO: 134 K/UL (ref 150–450)
PMV BLD AUTO: 10.4 FL (ref 9.2–12.9)
POTASSIUM SERPL-SCNC: 4.9 MMOL/L (ref 3.5–5.1)
PROT SERPL-MCNC: 6.4 G/DL (ref 6–8.4)
RBC # BLD AUTO: 3.96 M/UL (ref 4.6–6.2)
SODIUM SERPL-SCNC: 142 MMOL/L (ref 136–145)
WBC # BLD AUTO: 4.44 K/UL (ref 3.9–12.7)

## 2024-04-03 PROCEDURE — 80053 COMPREHEN METABOLIC PANEL: CPT | Performed by: PSYCHIATRY & NEUROLOGY

## 2024-04-03 PROCEDURE — 85025 COMPLETE CBC W/AUTO DIFF WBC: CPT | Performed by: PSYCHIATRY & NEUROLOGY

## 2024-04-03 PROCEDURE — 36415 COLL VENOUS BLD VENIPUNCTURE: CPT | Mod: PO | Performed by: PSYCHIATRY & NEUROLOGY

## 2024-04-11 ENCOUNTER — OFFICE VISIT (OUTPATIENT)
Dept: NEUROLOGY | Facility: CLINIC | Age: 79
End: 2024-04-11
Payer: MEDICARE

## 2024-04-11 DIAGNOSIS — G20.C PARKINSONISM, UNSPECIFIED PARKINSONISM TYPE: ICD-10-CM

## 2024-04-11 DIAGNOSIS — R25.1 TREMOR: ICD-10-CM

## 2024-04-11 DIAGNOSIS — R56.9 SEIZURE: ICD-10-CM

## 2024-04-11 DIAGNOSIS — C71.9 GLIOBLASTOMA MULTIFORME OF BRAIN: Primary | ICD-10-CM

## 2024-04-11 PROCEDURE — 99215 OFFICE O/P EST HI 40 MIN: CPT | Mod: 95,,, | Performed by: PSYCHIATRY & NEUROLOGY

## 2024-04-11 PROCEDURE — G2211 COMPLEX E/M VISIT ADD ON: HCPCS | Mod: 95,,, | Performed by: PSYCHIATRY & NEUROLOGY

## 2024-04-11 NOTE — PROGRESS NOTES
Subjective:       Patient ID: Chacorta Gómez is a 78 y.o. male.    Chief Complaint: glioblastoma    HPI  Oncologic History:   2/2023: admitted to outside hospital for new onset seizure, he was started on keppra. MRI brain was done and was reportedly suspicious for subacute infarct.   4-5/2023: was in [place] for a work trip, had recurrent seizures, was hospitalized there for 10 days.   6/2023: hospitalized for breakthrough seizures, lacosamide was added, underwent repeat MRI brain which showed 4.2 x 2.6 x 4.1 cm heterogeneous enhancing hemorrhagic medial right frontal lesion. A 2nd lesion along the planum sphenoidale has a dural base but appears to also involve the inferior frontal lobe parenchyma. Patient was seen by neurosurgery and underwent subtotal resection. Pathology consistent with GBM.   8/8-28/2023: chemoradiation to 40Gy over 15fx (Alexa Lisa)  10/2023: C1 TMZ @150mg/m2  12/2023: C2 TMZ @200mg/m2  2/7-11/2024: C3 TMZ @200mg/m2  3/28-4/1/2024: C4 TMZ (reportedly did not get enough of one of the pill strengths from the pharmacy, so took his full dose of 390mg days 1-2 and only 140mg days 3-5)    Chacorta is feeling ok. He tolerated the most recent cycle of TMZ well.     Past Medical History:   Diagnosis Date    Benign localized hyperplasia of prostate with urinary obstruction and other lower urinary tract symptoms (LUTS)(600.21)     Brain lesion     CVA (cerebral vascular accident)     [facility]    Essential hypertension 07/12/2016    Glaucoma     bilat    Multinodular goiter     Seizures     Spontaneous ecchymoses     Unspecified essential hypertension       Current Outpatient Medications   Medication Sig Dispense Refill    carbidopa-levodopa  mg (SINEMET)  mg per tablet Take 1 tablet by mouth 3 (three) times daily. 90 tablet 11    chlorpheniramine (CHLOR-TRIMETON) 4 mg tablet Take 4 mg by mouth daily as needed (sneezing attacks).      divalproex (DEPAKOTE) 500 MG TbEC Take 2 tablets  (1,000 mg total) by mouth every 12 (twelve) hours. 120 tablet 2    dorzolamide-timolol 2-0.5% (COSOPT) 22.3-6.8 mg/mL ophthalmic solution Place 1 drop into both eyes 2 (two) times daily.      famotidine (PEPCID) 20 MG tablet Take 20 mg by mouth daily as needed for Heartburn.      folic acid (FOLVITE) 1 MG tablet Take 1 tablet (1 mg total) by mouth once daily. 90 tablet 3    furosemide (LASIX) 20 MG tablet Take 1 tablet (20 mg total) by mouth daily as needed (swelling). 30 tablet 0    hydroCHLOROthiazide (HYDRODIURIL) 12.5 MG Tab Take 1 tablet (12.5 mg total) by mouth once daily. 90 tablet 3    HYDROcodone-acetaminophen (NORCO) 5-325 mg per tablet Take 1 tablet by mouth every 6 (six) hours as needed for Pain. 120 tablet 0    latanoprost 0.005 % ophthalmic solution Place 1 drop into both eyes every evening.      loratadine (CLARITIN) 10 mg tablet Take 10 mg by mouth once daily.      multivitamin Tab Take 1 tablet by mouth once daily. 30 tablet 0    mupirocin (BACTROBAN) 2 % ointment Apply topically 2 (two) times daily. To areas of abrasion/irritation on face/around lips-nose/left ear (Patient not taking: Reported on 2/21/2024) 15 g 0    naproxen sodium (ALEVE) 220 mg Cap Take by mouth.      omega 3-dha-epa-fish oil (FISH OIL) 1,000 mg (120 mg-180 mg) Cap Take 1 capsule by mouth Daily.      ondansetron (ZOFRAN) 8 MG tablet Take 8mg (1 tab) 30-45 minutes prior to chemotherapy dose and every 8 hours as needed 30 tablet 3    thiamine 100 MG tablet Take 100 mg by mouth once daily.       No current facility-administered medications for this visit.      Past Surgical History:   Procedure Laterality Date    COLONOSCOPY  08/03/2009    Dr. Askew    CRANIOTOMY FOR EXCISION OF INTRACRANIAL TUMOR      CRANIOTOMY, WITH NEOPLASM EXCISION USING COMPUTER-ASSISTED NAVIGATION Right 6/13/2023    Procedure: CRANIOTOMY, WITH NEOPLASM EXCISION USING COMPUTER-ASSISTED NAVIGATION -     RIGHT FRONTAL;  Surgeon: Joao English MD;   Location: [facility] OR;  Service: Neurosurgery;  Laterality: Right;    NASAL SEPTUM SURGERY      TONSILLECTOMY        Family History   Problem Relation Age of Onset    Heart attack Mother     Hypertension Mother     Cancer Father         prostate     Hypertension Brother     Stroke Brother         mild       Social History     Tobacco Use    Smoking status: Never     Passive exposure: Never    Smokeless tobacco: Never   Substance Use Topics    Alcohol use: Not Currently     Alcohol/week: 14.0 standard drinks of alcohol     Types: 7 Glasses of wine, 7 Cans of beer per week    Drug use: Never      Review of Systems  KPS: 80        Objective:      There were no vitals filed for this visit.        NEUROLOGICAL EXAMINATION:     MENTAL STATUS   Attention: normal. Concentration: normal.   Speech: speech is normal   Level of consciousness: alert    CRANIAL NERVES   Cranial nerves II through XII intact.     MOTOR EXAM   Muscle bulk: normal  Right arm tone: increased    Strength   Strength 5/5 throughout.        Rigidity and bradykinesia R>L  Medium-frequency, low to medium-amplitude tremor of the right hand>left, more at rest than with posture or action     SENSORY EXAM   Light touch normal.     GAIT AND COORDINATION      Coordination   Finger to nose coordination: normal       Stooped posture with mild shuffling. En bloc, multistep turn.        MRI brain from 12/12/2023 was personally visualized and compared to prior.  1. There may be slight decrease in the amount of FLAIR hyperintense signal associated with the inferior right frontal enhancing mass.  Also, transverse measurement of this irregular enhancing mass has minimally decreased but now the enhancement extends to the level of the right frontal horn.  Previously, there is no enhancement actually touching the lateral ventricle but there was nonspecific and nonenhancing FLAIR signal abnormality, possibly representing nonenhancing tumor or treatment change.  2. There is no  significant or detrimental change in the high medial right frontal resection cavity where there is persistent nodular enhancement without interval progression.    Assessment:       Problem List Items Addressed This Visit          Oncology    Glioblastoma multiforme of brain - Primary     Other Visit Diagnoses       Tremor        Parkinsonism, unspecified Parkinsonism type        Seizure                    Plan:       Chacorta Gómez is a 78 y.o. male with a glioblastoma, MGMT-methylated, IDH-wt, WHO grade 4 s/p subtotal resection and chemoradiation to 40Gy over 15fx presenting for treatment recommendations. Tumor diagnosis was heralded by seizure.     Glioblastoma  Chacorta is on C4D15. He will f/u in 2 weeks after his scheduled surveillance MRI brain with CBC/CMP in anticipation of C5 TMZ.    Tremor/parkinsonism  Pill rolling tremor, bradykinesia, and rigidity R>L along with stooped posture and shuffling consistent with parkinsonism. It is odd that he and wife feel this started suddenly after his surgery as he is at least a H&Y stage 2. Symptoms mildly improved on Sinemet 25-100mg 1.5 tab tid. Advised taking the medication at 8-9AM (upon awakening), noon, and 4pm, rather than morning/mid-day/bedtime to maximize coverage during the day. If this is not adequate, can increase dose and/or add Artane.    Seizures  Depakote 1000mg bid. Stopped Vimpat inadvertently; unclear when, but has not had additional seizures. CTM.            The patient location is: home in [place]  The chief complaint leading to consultation is: glioblastoma    Visit type: audiovisual    Face to Face time with patient: 7 min      Visit today included increased complexity associated with the care of the episodic problem seizures, glioblastoma, tremor/parkinsonism addressed and managing the longitudinal care of the patient due to the serious and/or complex managed problem(s) glioblastoma/seizures/tremor/parkinsonism.     Lucita Macias MD  Department  of Neurology  Neuro-Oncology, Movement Disorders

## 2024-04-18 ENCOUNTER — OFFICE VISIT (OUTPATIENT)
Dept: PALLIATIVE MEDICINE | Facility: CLINIC | Age: 79
End: 2024-04-18
Payer: MEDICARE

## 2024-04-18 DIAGNOSIS — G40.909 SEIZURE DISORDER: ICD-10-CM

## 2024-04-18 PROCEDURE — 99215 OFFICE O/P EST HI 40 MIN: CPT | Mod: 95,,, | Performed by: STUDENT IN AN ORGANIZED HEALTH CARE EDUCATION/TRAINING PROGRAM

## 2024-04-18 PROCEDURE — 1159F MED LIST DOCD IN RCRD: CPT | Mod: CPTII,95,, | Performed by: STUDENT IN AN ORGANIZED HEALTH CARE EDUCATION/TRAINING PROGRAM

## 2024-04-18 RX ORDER — DIVALPROEX SODIUM 500 MG/1
1000 TABLET, DELAYED RELEASE ORAL EVERY 12 HOURS
Qty: 120 TABLET | Refills: 2 | Status: SHIPPED | OUTPATIENT
Start: 2024-04-18 | End: 2024-07-17

## 2024-04-18 NOTE — PROGRESS NOTES
Palliative Medicine Clinic Note        Consult Requested By: No ref. provider found      Reason for Consult/Chief Complaint: Glibolastoma multiforme, worsening back pain      The patient location is: [place], [place]  The chief complaint leading to consultation is: Fatigue    Visit type: audiovisual    Face to Face time with patient: 11  40 minutes of total time spent on the encounter, which includes face to face time and non-face to face time preparing to see the patient (eg, review of tests), Obtaining and/or reviewing separately obtained history, Documenting clinical information in the electronic or other health record, Independently interpreting results (not separately reported) and communicating results to the patient/family/caregiver, or Care coordination (not separately reported).       Each patient provided with medical services by telemedicine is:  (1) informed of the relationship between the physician and patient and the respective role of any other health care provider with respect to management of the patient; and (2) notified that he or she may decline to receive medical services by telemedicine and may withdraw from such care at any time.           ASSESSMENT/PLAN:      Plan/Recommendations:   Chacorta was seen today for fatigue.    Diagnoses and all orders for this visit:    Seizure disorder  The following orders have not been finalized:  -     divalproex (DEPAKOTE) 500 MG TbEC          Assessment & Plan    Glioblastoma Multiforme  - S/p subtotal resection with NSGY and adjuvant radiation therapy in 2023  - Follows with Dr. Macias, on tezolomide  - Repeat MRI q2mo  - Discussed that cancer likely to recur or worsen at some point, though we hope his current period of relatively minimal symptoms continues for quite some time    Cancer associated pain/Degenerative back pain:  - Prescribed a low dose of hydrocodone (Norco) for back pain management, instructing the patient to take it as needed, up to 3 times  daily.  - Explained the potential side effects of hydrocodone, including constipation, and advised the use of stool softeners if necessary.  - Emphasized the importance of monitoring pain levels and adjusting the medication accordingly.  - Scheduled a virtual follow-up visit in 1 month to assess the effectiveness of the prescribed medication and address any other concerns.   - May benefit from evaluation for facet arthropathy based on pain descriptor    Fatigue  -Advised on non-pharmacologic techines for coping with fatigue, which is common in both cancer and Parkinson's  -Considered prescribing central nervous system stimulants to manage fatigue, but ultimately decided against it due to the potential risk of seizures.         Advance Care Planning   Advance Directives:   Agent's Name:  Esperanza Gómez   Agent's Contact Number:  [phone]    Decision Making:  Patient answered questions  Goals of Care: The patient endorses that what is most important right now is to focus on remaining as independent as possible and symptom/pain control    Accordingly, we have decided that the best plan to meet the patient's goals includes continuing with treatment                   Follow up: 2 months     Plan discussed with: RAFA             SUBJECTIVE:          History of Present Illness / Interval History:  Chacorta Gómez is 78 y.o. year old male presenting with Glioblastoma Multiforme s/p subtotal resection with NSGY and adjuvant radiation therapy.  Referred to Palliative Care for physical symptoms, advance care planning,, and additional support..    04/18/2024: The patient with Parkinson's disease and lymphedema has been attending rehab almost daily for the past month to improve leg strength and balance. The patient reports feeling uneasy while walking but acknowledges some improvement in the condition due to rehab. The rehab also included lymphatic drainage techniques for swollen feet, which have helped reduce the swelling  significantly, although ankles remain swollen. . Dr. Boswell recently adjusted the Sinemet (Carbidopa Levodopa) dosage, a medication for Parkinson's, to help the patient feel less stiff and move more. The patient also mentioned restarting chemotherapy medication but had an issue with the number of capsules received. The patient is currently out of chemotherapy medication and is unsure if more should have been received. . The patient also ran out of Divalproex (Depakote) medication, which is used to prevent seizures. The patient reports feeling fatigued, which interferes with activities to some extent. The patient gets short of breath after a lot of walking and needs to take a nap afterward. Despite this, the patient was able to go shopping at [employer], pushing a basket around the store. . The patient also reports a persistent tremor in the hands, which has not improved and affects the ability to paint, an activity previously enjoyed. 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 The patient also mentioned depending on the spouse for many tasks that were previously done independently.       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 He was hospitalized in [place] for 10 days. After returning to the [place], an MRI revealed a brain tumor, which was diagnosed as glioblastoma. His main concerns are the tremors in his hands, which make it difficult for him to paint and write, and worsening lower back pain. He has tried Parkinson's medication with minimal improvement in the tremors. He has a history of degenerative spine issues in the lower back. He is currently undergoing rehab and has recently started  treatment for lymphedema. He denies any chest pain, shortness of breath, or left leg numbness. He denies any history of diabetes, hypertension, or prostate cancer.    GOALS OF CARE/ADVANCED DIRECTIVES:  - He is primarily concerned about his hand tremors and back pain, which are currently inhibiting his ability to paint, a significant source of celeste and purpose in his life.  - His wife is his primary caregiver and the person he would want to make medical decisions for him if he were unable to do so himself.  - He has other relatives who could potentially provide support if needed, but they live in [place], which is a distance away from his home.  - He expressed concern about needing home care in the future, particularly in relation to managing his medications.    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                  ROS:  Review of Systems    Review of Symptoms      Symptom Assessment (ESAS 0-10 Scale)  Pain:  3  Dyspnea:  6  Anxiety:  2  Nausea:  1  Depression:  2  Anorexia:  2  Fatigue:  6  Insomnia:  3  Restlessness:  0  Agitation:  0         Pain Assessment:    Location(s): back    Back       Location: lower and bilateral        Quality: Dull and sharp        Quantity: 6/10 in intensity        Chronicity: Onset 2 year(s) ago, gradually worsening        Aggravating Factors: Movement        Alleviating Factors: Acetaminophen and NSAIDs       Associated Symptoms: None    Performance Status:  70    Living Arrangements:  Lives with spouse    Psychosocial/Cultural:   See Palliative Psychosocial Note: Yes  **Primary  to Follow**  Palliative Care  Consult: Yes        External  database  queried on 04/18/2024  by Shon Jett     Medications:    Current Outpatient Medications:     carbidopa-levodopa  mg (SINEMET)  mg per tablet, Take 1 tablet by mouth 3 (three) times daily., Disp: 90 tablet, Rfl: 11    chlorpheniramine (CHLOR-TRIMETON) 4 mg tablet, Take 4 mg by mouth daily as needed (sneezing attacks)., Disp: , Rfl:     dorzolamide-timolol 2-0.5% (COSOPT) 22.3-6.8 mg/mL ophthalmic solution, Place 1 drop into both eyes 2 (two) times daily., Disp: , Rfl:     famotidine (PEPCID) 20 MG tablet, Take 20 mg by mouth daily as needed for Heartburn., Disp: , Rfl:     folic acid (FOLVITE) 1 MG tablet, Take 1 tablet (1 mg total) by mouth once daily., Disp: 90 tablet, Rfl: 3    hydroCHLOROthiazide (HYDRODIURIL) 12.5 MG Tab, Take 1 tablet (12.5 mg total) by mouth once daily., Disp: 90 tablet, Rfl: 3    HYDROcodone-acetaminophen (NORCO) 5-325 mg per tablet, Take 1 tablet by mouth every 6 (six) hours as needed for Pain., Disp: 120 tablet, Rfl: 0    latanoprost 0.005 % ophthalmic solution, Place 1 drop into both eyes every evening., Disp: , Rfl:     loratadine (CLARITIN) 10 mg tablet, Take 10 mg by mouth once daily., Disp: , Rfl:     multivitamin Tab, Take 1 tablet by mouth once daily., Disp: 30 tablet, Rfl: 0    mupirocin (BACTROBAN) 2 % ointment, Apply topically 2 (two) times daily. To areas of abrasion/irritation on face/around lips-nose/left ear, Disp: 15 g, Rfl: 0    naproxen sodium (ALEVE) 220 mg Cap, Take by mouth., Disp: , Rfl:     omega 3-dha-epa-fish oil (FISH OIL) 1,000 mg (120 mg-180 mg) Cap, Take 1 capsule by mouth Daily., Disp: , Rfl:     ondansetron (ZOFRAN) 8 MG tablet, Take 8mg (1 tab) 30-45 minutes prior to chemotherapy dose and every 8 hours as needed, Disp: 30 tablet, Rfl: 3    thiamine 100 MG tablet, Take 100 mg by mouth once daily., Disp: , Rfl:     divalproex (DEPAKOTE) 500 MG TbEC, Take 2 tablets (1,000 mg total) by mouth every 12 (twelve) hours., Disp: 120 tablet, Rfl: 2     furosemide (LASIX) 20 MG tablet, Take 1 tablet (20 mg total) by mouth daily as needed (swelling)., Disp: 30 tablet, Rfl: 0    Review of patient's allergies indicates:  No Known Allergies        OBJECTIVE:         Physical Exam:  Vitals:    Physical ExamPhysical Exam            Unable to perform full physical exam due to telemedicine visit.  Limited exam:  Gen: NAD; pleasant and engaged conversation; no signs of discomfort  Non diaphoretic  Speech normal  No increased work of breathing  No cyanosis  Able to walk around living room without difficulty        I spent a total of 40 minutes on the day of the visit. This includes face to face time in discussion of goals of care, symptom assessment, coordination of care and emotional support.  This also includes non-face to face time preparing to see the patient (eg, review of tests/imaging), obtaining and/or reviewing separately obtained history, documenting clinical information in the electronic or other health record, independently interpreting results and communicating results to the patient/family/caregiver, or care coordinator.       Additional 5 min time spent on a voluntary advance care planning and /or goals of care discussion, providing emotional support, formulating and communicating prognosis and exploring burden/benefit of various approaches of treatment.     This note was generated with the assistance of ambient listening technology. Verbal consent was obtained by the patient and accompanying visitor(s) for the recording of patient appointment to facilitate this note. I attest to having reviewed and edited the generated note for accuracy, though some syntax or spelling errors may persist. Please contact the author of this note for any clarification.

## 2024-04-18 NOTE — Clinical Note
Dr. Macias,  During our visit today mr. Whatley noted that he only had about 5 days of temozolomide, and has been off of it for a few weeks at this point. He wanted me to reach out to you to make sure he was supposed to be on a break from it until his next scan.  He also mentions significant fatigue that currently I do not think is severe enough to be worth even the theoretical risk of worsening the potential for seizures. I think between GBM/antiseizure meds, and parkinson's fatigue will likely remain an issue but be difficult to treat without worsening some other issue. I advised him on some non-pharmacologic advice for dealing with fatigue.

## 2024-04-25 ENCOUNTER — OFFICE VISIT (OUTPATIENT)
Dept: NEUROSURGERY | Facility: CLINIC | Age: 79
End: 2024-04-25
Payer: MEDICARE

## 2024-04-25 ENCOUNTER — HOSPITAL ENCOUNTER (OUTPATIENT)
Dept: RADIOLOGY | Facility: HOSPITAL | Age: 79
Discharge: HOME | End: 2024-04-25
Attending: NEUROLOGICAL SURGERY
Payer: MEDICARE

## 2024-04-25 VITALS
SYSTOLIC BLOOD PRESSURE: 145 MMHG | DIASTOLIC BLOOD PRESSURE: 81 MMHG | HEIGHT: 71 IN | HEART RATE: 49 BPM | BODY MASS INDEX: 22.16 KG/M2 | WEIGHT: 158.31 LBS | RESPIRATION RATE: 18 BRPM

## 2024-04-25 DIAGNOSIS — G93.9 BRAIN LESION: Primary | ICD-10-CM

## 2024-04-25 DIAGNOSIS — C71.9 GLIOBLASTOMA MULTIFORME OF BRAIN: Primary | ICD-10-CM

## 2024-04-25 DIAGNOSIS — C71.9 GLIOBLASTOMA MULTIFORME OF BRAIN: ICD-10-CM

## 2024-04-25 PROCEDURE — 99215 OFFICE O/P EST HI 40 MIN: CPT | Mod: S$GLB,,, | Performed by: NEUROLOGICAL SURGERY

## 2024-04-25 PROCEDURE — 1125F AMNT PAIN NOTED PAIN PRSNT: CPT | Mod: CPTII,S$GLB,, | Performed by: NEUROLOGICAL SURGERY

## 2024-04-25 PROCEDURE — 3288F FALL RISK ASSESSMENT DOCD: CPT | Mod: CPTII,S$GLB,, | Performed by: NEUROLOGICAL SURGERY

## 2024-04-25 PROCEDURE — 1100F PTFALLS ASSESS-DOCD GE2>/YR: CPT | Mod: CPTII,S$GLB,, | Performed by: NEUROLOGICAL SURGERY

## 2024-04-25 PROCEDURE — 70553 MRI BRAIN STEM W/O & W/DYE: CPT | Mod: 26,,, | Performed by: RADIOLOGY

## 2024-04-25 PROCEDURE — 3077F SYST BP >= 140 MM HG: CPT | Mod: CPTII,S$GLB,, | Performed by: NEUROLOGICAL SURGERY

## 2024-04-25 PROCEDURE — A9585 GADOBUTROL INJECTION: HCPCS | Mod: PO | Performed by: NEUROLOGICAL SURGERY

## 2024-04-25 PROCEDURE — 25500020 PHARM REV CODE 255: Mod: PO | Performed by: NEUROLOGICAL SURGERY

## 2024-04-25 PROCEDURE — 70553 MRI BRAIN STEM W/O & W/DYE: CPT | Mod: TC,PO

## 2024-04-25 PROCEDURE — 3079F DIAST BP 80-89 MM HG: CPT | Mod: CPTII,S$GLB,, | Performed by: NEUROLOGICAL SURGERY

## 2024-04-25 RX ORDER — GADOBUTROL 604.72 MG/ML
7 INJECTION INTRAVENOUS
Status: COMPLETED | OUTPATIENT
Start: 2024-04-25 | End: 2024-04-25

## 2024-04-25 RX ADMIN — GADOBUTROL 7 ML: 604.72 INJECTION INTRAVENOUS at 10:04

## 2024-04-25 NOTE — PROGRESS NOTES
Neurosurgery History & Physical    Patient ID: Chacorta Gómez is a 78 y.o. male.    Chief Complaint   Patient presents with    Follow-up     MRI f/u, brain     Interval HPI 4/25/2024:  Mr. Oakes returns for routine two month repeat MRI brain.  He denies significant new issues in the interval    Interval HPI 2/21/2024:  Mr. Gómez is a 78 year old male who returns for routine repeat MRI of the brain with and without contrast.     Interval HPI 1/9/2024:  Mr. Gómez returns for repeat MRI brain with and without contrast.  He does continue to deal with tremors since surgery.       Interval HPI 10/11/2023:  Mr. Gómez is now status post his initial round of temozolomide and fractionated radiation therapy.  He overall is doing well.  He denies new focal neurologic deficit.  Does continue to have a right upper extremity tremor which has been present since surgery.  He does report that he feels somewhat off balance and deconditioned with his walking.     He also describes significant edema in his bilateral lower extremities which has been present since before surgery.  He states that he was started on a diuretic medication by his primary care physician but this has not cure the issue.     They have an appointment with Dr. Macias tomorrow.     He returns for routine surveillance MRI of the brain with and without contrast.      Past Medical History:   Diagnosis Date    Benign localized hyperplasia of prostate with urinary obstruction and other lower urinary tract symptoms (LUTS)(600.21)     Brain lesion     CVA (cerebral vascular accident)     [facility]    Essential hypertension 07/12/2016    Glaucoma     bilat    Multinodular goiter     Seizures     Spontaneous ecchymoses     Unspecified essential hypertension      Social History     Socioeconomic History    Marital status:      Spouse name: Uyen    Number of children: 0   Tobacco Use    Smoking status: Never     Passive exposure: Never     Smokeless tobacco: Never   Substance and Sexual Activity    Alcohol use: Not Currently     Alcohol/week: 14.0 standard drinks of alcohol     Types: 7 Glasses of wine, 7 Cans of beer per week    Drug use: Never    Sexual activity: Yes     Partners: Female     Social Determinants of Health     Financial Resource Strain: Low Risk  (1/24/2024)    Overall Financial Resource Strain (CARDIA)     Difficulty of Paying Living Expenses: Not very hard   Food Insecurity: No Food Insecurity (1/24/2024)    Hunger Vital Sign     Worried About Running Out of Food in the Last Year: Never true     Ran Out of Food in the Last Year: Never true   Transportation Needs: No Transportation Needs (1/24/2024)    PRAPARE - Transportation     Lack of Transportation (Medical): No     Lack of Transportation (Non-Medical): No   Physical Activity: Inactive (1/24/2024)    Exercise Vital Sign     Days of Exercise per Week: 0 days     Minutes of Exercise per Session: 0 min   Stress: No Stress Concern Present (1/24/2024)    [employer] [place] [employer] - Occupational Stress Questionnaire     Feeling of Stress : Only a little   Social Connections: Socially Integrated (1/24/2024)    Social Connection and Isolation Panel [NHANES]     Frequency of Communication with Friends and Family: More than three times a week     Frequency of Social Gatherings with Friends and Family: Twice a week     Attends Adventism Services: More than 4 times per year     Active Member of Clubs or Organizations: Yes     Attends Club or Organization Meetings: More than 4 times per year     Marital Status:    Housing Stability: Low Risk  (1/24/2024)    Housing Stability Vital Sign     Unable to Pay for Housing in the Last Year: No     Number of Places Lived in the Last Year: 1     Unstable Housing in the Last Year: No     Family History   Problem Relation Name Age of Onset    Heart attack Mother      Hypertension Mother      Cancer Father          prostate      Hypertension Brother      Stroke Brother          mild      Review of patient's allergies indicates:  No Known Allergies    Current Outpatient Medications:     carbidopa-levodopa  mg (SINEMET)  mg per tablet, Take 1 tablet by mouth 3 (three) times daily., Disp: 90 tablet, Rfl: 11    chlorpheniramine (CHLOR-TRIMETON) 4 mg tablet, Take 4 mg by mouth daily as needed (sneezing attacks)., Disp: , Rfl:     divalproex (DEPAKOTE) 500 MG TbEC, Take 2 tablets (1,000 mg total) by mouth every 12 (twelve) hours., Disp: 120 tablet, Rfl: 2    dorzolamide-timolol 2-0.5% (COSOPT) 22.3-6.8 mg/mL ophthalmic solution, Place 1 drop into both eyes 2 (two) times daily., Disp: , Rfl:     famotidine (PEPCID) 20 MG tablet, Take 20 mg by mouth daily as needed for Heartburn., Disp: , Rfl:     folic acid (FOLVITE) 1 MG tablet, Take 1 tablet (1 mg total) by mouth once daily., Disp: 90 tablet, Rfl: 3    hydroCHLOROthiazide (HYDRODIURIL) 12.5 MG Tab, Take 1 tablet (12.5 mg total) by mouth once daily., Disp: 90 tablet, Rfl: 3    HYDROcodone-acetaminophen (NORCO) 5-325 mg per tablet, Take 1 tablet by mouth every 6 (six) hours as needed for Pain., Disp: 120 tablet, Rfl: 0    latanoprost 0.005 % ophthalmic solution, Place 1 drop into both eyes every evening., Disp: , Rfl:     loratadine (CLARITIN) 10 mg tablet, Take 10 mg by mouth once daily., Disp: , Rfl:     multivitamin Tab, Take 1 tablet by mouth once daily., Disp: 30 tablet, Rfl: 0    naproxen sodium (ALEVE) 220 mg Cap, Take by mouth., Disp: , Rfl:     omega 3-dha-epa-fish oil (FISH OIL) 1,000 mg (120 mg-180 mg) Cap, Take 1 capsule by mouth Daily., Disp: , Rfl:     ondansetron (ZOFRAN) 8 MG tablet, Take 8mg (1 tab) 30-45 minutes prior to chemotherapy dose and every 8 hours as needed, Disp: 30 tablet, Rfl: 3    thiamine 100 MG tablet, Take 100 mg by mouth once daily., Disp: , Rfl:     furosemide (LASIX) 20 MG tablet, Take 1 tablet (20 mg total) by mouth daily as needed (swelling).,  "Disp: 30 tablet, Rfl: 0    mupirocin (BACTROBAN) 2 % ointment, Apply topically 2 (two) times daily. To areas of abrasion/irritation on face/around lips-nose/left ear (Patient not taking: Reported on 4/25/2024), Disp: 15 g, Rfl: 0  No current facility-administered medications for this visit.  Blood pressure (!) 145/81, pulse (!) 49, resp. rate 18, height 5' 11" (1.803 m), weight 71.8 kg (158 lb 4.6 oz).      Neurologic Exam  Neurologic Exam  Alert and oriented x4   Strength is 5/5 in the bilateral upper and lower extremities   Resting tremor in the right upper extremity which increases with intention  Sensation is grossly intact to light touch  Wound is well healed    Imaging:  MRI of the brain with and without contrast dated 04/25/2024 is personally reviewed and discussed with the patient.  There is no significant change in my examination between MRI of 04/25/2024 and MRI to 202024.  I do note the area of enhancement just under the craniotomy flap which appears slightly more vivid when compared to 02/20/2024 however this is potentially due to differences in technique.  Overall I feel this represents a stable scan.    Assessment/Plan:   Mr. Gómez is a 78-year-old gentleman with multifocal glioblastoma now approximately 10 months status post resection of the dominant lesion.     He is continuing to follow with Dr. Macias  No role for repeat surgical intervention at this time.  We will follow up in 2 months with a repeat MRI of the brain with and without contrast.    "

## 2024-04-30 ENCOUNTER — TELEPHONE (OUTPATIENT)
Dept: NEUROSURGERY | Facility: CLINIC | Age: 79
End: 2024-04-30
Payer: MEDICARE

## 2024-05-02 ENCOUNTER — OFFICE VISIT (OUTPATIENT)
Dept: NEUROLOGY | Facility: CLINIC | Age: 79
End: 2024-05-02
Payer: MEDICARE

## 2024-05-02 ENCOUNTER — LAB VISIT (OUTPATIENT)
Dept: LAB | Facility: HOSPITAL | Age: 79
End: 2024-05-02
Attending: PSYCHIATRY & NEUROLOGY
Payer: MEDICARE

## 2024-05-02 VITALS
DIASTOLIC BLOOD PRESSURE: 88 MMHG | HEIGHT: 72 IN | HEART RATE: 45 BPM | SYSTOLIC BLOOD PRESSURE: 151 MMHG | BODY MASS INDEX: 21.07 KG/M2 | WEIGHT: 155.56 LBS

## 2024-05-02 DIAGNOSIS — R56.9 SEIZURE: ICD-10-CM

## 2024-05-02 DIAGNOSIS — R25.1 TREMOR: ICD-10-CM

## 2024-05-02 DIAGNOSIS — G20.C PARKINSONISM, UNSPECIFIED PARKINSONISM TYPE: ICD-10-CM

## 2024-05-02 DIAGNOSIS — C71.9 GLIOBLASTOMA DETERMINED BY BIOPSY OF BRAIN: ICD-10-CM

## 2024-05-02 DIAGNOSIS — C71.9 GLIOBLASTOMA MULTIFORME OF BRAIN: Primary | ICD-10-CM

## 2024-05-02 LAB
ALBUMIN SERPL BCP-MCNC: 3.5 G/DL (ref 3.5–5.2)
ALP SERPL-CCNC: 35 U/L (ref 55–135)
ALT SERPL W/O P-5'-P-CCNC: 10 U/L (ref 10–44)
ANION GAP SERPL CALC-SCNC: 7 MMOL/L (ref 8–16)
AST SERPL-CCNC: 24 U/L (ref 10–40)
BASOPHILS # BLD AUTO: 0.02 K/UL (ref 0–0.2)
BASOPHILS NFR BLD: 0.4 % (ref 0–1.9)
BILIRUB SERPL-MCNC: 0.5 MG/DL (ref 0.1–1)
BUN SERPL-MCNC: 19 MG/DL (ref 8–23)
CALCIUM SERPL-MCNC: 9.5 MG/DL (ref 8.7–10.5)
CHLORIDE SERPL-SCNC: 102 MMOL/L (ref 95–110)
CO2 SERPL-SCNC: 30 MMOL/L (ref 23–29)
CREAT SERPL-MCNC: 0.7 MG/DL (ref 0.5–1.4)
DIFFERENTIAL METHOD BLD: ABNORMAL
EOSINOPHIL # BLD AUTO: 0.5 K/UL (ref 0–0.5)
EOSINOPHIL NFR BLD: 9.1 % (ref 0–8)
ERYTHROCYTE [DISTWIDTH] IN BLOOD BY AUTOMATED COUNT: 14.3 % (ref 11.5–14.5)
EST. GFR  (NO RACE VARIABLE): >60 ML/MIN/1.73 M^2
GLUCOSE SERPL-MCNC: 87 MG/DL (ref 70–110)
HCT VFR BLD AUTO: 37.6 % (ref 40–54)
HGB BLD-MCNC: 12.6 G/DL (ref 14–18)
IMM GRANULOCYTES # BLD AUTO: 0.01 K/UL (ref 0–0.04)
IMM GRANULOCYTES NFR BLD AUTO: 0.2 % (ref 0–0.5)
LYMPHOCYTES # BLD AUTO: 2.2 K/UL (ref 1–4.8)
LYMPHOCYTES NFR BLD: 42.9 % (ref 18–48)
MCH RBC QN AUTO: 34.3 PG (ref 27–31)
MCHC RBC AUTO-ENTMCNC: 33.5 G/DL (ref 32–36)
MCV RBC AUTO: 103 FL (ref 82–98)
MONOCYTES # BLD AUTO: 0.4 K/UL (ref 0.3–1)
MONOCYTES NFR BLD: 8.7 % (ref 4–15)
NEUTROPHILS # BLD AUTO: 1.9 K/UL (ref 1.8–7.7)
NEUTROPHILS NFR BLD: 38.7 % (ref 38–73)
NRBC BLD-RTO: 0 /100 WBC
PLATELET # BLD AUTO: 127 K/UL (ref 150–450)
PMV BLD AUTO: 10.1 FL (ref 9.2–12.9)
POTASSIUM SERPL-SCNC: 3.8 MMOL/L (ref 3.5–5.1)
PROT SERPL-MCNC: 6.1 G/DL (ref 6–8.4)
RBC # BLD AUTO: 3.67 M/UL (ref 4.6–6.2)
SODIUM SERPL-SCNC: 139 MMOL/L (ref 136–145)
WBC # BLD AUTO: 5.03 K/UL (ref 3.9–12.7)

## 2024-05-02 PROCEDURE — 1101F PT FALLS ASSESS-DOCD LE1/YR: CPT | Mod: CPTII,S$GLB,, | Performed by: PSYCHIATRY & NEUROLOGY

## 2024-05-02 PROCEDURE — 1126F AMNT PAIN NOTED NONE PRSNT: CPT | Mod: CPTII,S$GLB,, | Performed by: PSYCHIATRY & NEUROLOGY

## 2024-05-02 PROCEDURE — 99215 OFFICE O/P EST HI 40 MIN: CPT | Mod: S$GLB,,, | Performed by: PSYCHIATRY & NEUROLOGY

## 2024-05-02 PROCEDURE — 36415 COLL VENOUS BLD VENIPUNCTURE: CPT | Performed by: PSYCHIATRY & NEUROLOGY

## 2024-05-02 PROCEDURE — G2211 COMPLEX E/M VISIT ADD ON: HCPCS | Mod: S$GLB,,, | Performed by: PSYCHIATRY & NEUROLOGY

## 2024-05-02 PROCEDURE — 99999 PR PBB SHADOW E&M-EST. PATIENT-LVL III: CPT | Mod: PBBFAC,,, | Performed by: PSYCHIATRY & NEUROLOGY

## 2024-05-02 PROCEDURE — 1159F MED LIST DOCD IN RCRD: CPT | Mod: CPTII,S$GLB,, | Performed by: PSYCHIATRY & NEUROLOGY

## 2024-05-02 PROCEDURE — 80053 COMPREHEN METABOLIC PANEL: CPT | Performed by: PSYCHIATRY & NEUROLOGY

## 2024-05-02 PROCEDURE — 3077F SYST BP >= 140 MM HG: CPT | Mod: CPTII,S$GLB,, | Performed by: PSYCHIATRY & NEUROLOGY

## 2024-05-02 PROCEDURE — 3079F DIAST BP 80-89 MM HG: CPT | Mod: CPTII,S$GLB,, | Performed by: PSYCHIATRY & NEUROLOGY

## 2024-05-02 PROCEDURE — 85025 COMPLETE CBC W/AUTO DIFF WBC: CPT | Performed by: PSYCHIATRY & NEUROLOGY

## 2024-05-02 PROCEDURE — 3288F FALL RISK ASSESSMENT DOCD: CPT | Mod: CPTII,S$GLB,, | Performed by: PSYCHIATRY & NEUROLOGY

## 2024-05-02 NOTE — PROGRESS NOTES
Subjective:       Patient ID: Chacorta Gómez is a 78 y.o. male.    Chief Complaint: glioblastoma    HPI  Oncologic History:   2/2023: admitted to outside hospital for new onset seizure, he was started on keppra. MRI brain was done and was reportedly suspicious for subacute infarct.   4-5/2023: was in [place] for a work trip, had recurrent seizures, was hospitalized there for 10 days.   6/2023: hospitalized for breakthrough seizures, lacosamide was added, underwent repeat MRI brain which showed 4.2 x 2.6 x 4.1 cm heterogeneous enhancing hemorrhagic medial right frontal lesion. A 2nd lesion along the planum sphenoidale has a dural base but appears to also involve the inferior frontal lobe parenchyma. Patient was seen by neurosurgery and underwent subtotal resection. Pathology consistent with GBM.   8/8-28/2023: chemoradiation to 40Gy over 15fx (Alexa Lisa)  10/2023: C1 TMZ @150mg/m2  12/2023: C2 TMZ @200mg/m2  2/7-11/2024: C3 TMZ @200mg/m2  3/28-4/1/2024: C4 TMZ (reportedly did not get enough of one of the pill strengths from the pharmacy, so took his full dose of 390mg days 1-2 and only 140mg days 3-5)    Chacorta is feeling ok. He tolerated the most recent cycle of TMZ well. He had a fall the other day when trying to stand up from kneeling in front of the refrigerator; when he stood he fell forward and could not catch himself. He is participating in PT and feels his mobility is improving. His tremor has improved enough that he can paint some again.    Past Medical History:   Diagnosis Date    Benign localized hyperplasia of prostate with urinary obstruction and other lower urinary tract symptoms (LUTS)(600.21)     Brain lesion     CVA (cerebral vascular accident)     [facility]    Essential hypertension 07/12/2016    Glaucoma     bilat    Multinodular goiter     Seizures     Spontaneous ecchymoses     Unspecified essential hypertension       Current Outpatient Medications   Medication Sig Dispense Refill     carbidopa-levodopa  mg (SINEMET)  mg per tablet Take 1 tablet by mouth 3 (three) times daily. 90 tablet 11    chlorpheniramine (CHLOR-TRIMETON) 4 mg tablet Take 4 mg by mouth daily as needed (sneezing attacks).      divalproex (DEPAKOTE) 500 MG TbEC Take 2 tablets (1,000 mg total) by mouth every 12 (twelve) hours. 120 tablet 2    dorzolamide-timolol 2-0.5% (COSOPT) 22.3-6.8 mg/mL ophthalmic solution Place 1 drop into both eyes 2 (two) times daily.      famotidine (PEPCID) 20 MG tablet Take 20 mg by mouth daily as needed for Heartburn.      folic acid (FOLVITE) 1 MG tablet Take 1 tablet (1 mg total) by mouth once daily. 90 tablet 3    hydroCHLOROthiazide (HYDRODIURIL) 12.5 MG Tab Take 1 tablet (12.5 mg total) by mouth once daily. 90 tablet 3    HYDROcodone-acetaminophen (NORCO) 5-325 mg per tablet Take 1 tablet by mouth every 6 (six) hours as needed for Pain. 120 tablet 0    latanoprost 0.005 % ophthalmic solution Place 1 drop into both eyes every evening.      loratadine (CLARITIN) 10 mg tablet Take 10 mg by mouth once daily.      multivitamin Tab Take 1 tablet by mouth once daily. 30 tablet 0    naproxen sodium (ALEVE) 220 mg Cap Take by mouth.      omega 3-dha-epa-fish oil (FISH OIL) 1,000 mg (120 mg-180 mg) Cap Take 1 capsule by mouth Daily.      ondansetron (ZOFRAN) 8 MG tablet Take 8mg (1 tab) 30-45 minutes prior to chemotherapy dose and every 8 hours as needed 30 tablet 3    thiamine 100 MG tablet Take 100 mg by mouth once daily.      furosemide (LASIX) 20 MG tablet Take 1 tablet (20 mg total) by mouth daily as needed (swelling). 30 tablet 0    temozolomide (TEMODAR) 180 MG capsule Take 2 capsules (360 mg total) by mouth once daily For days 1-5/28 day cycle with 1 other temozolomide prescription for 380 mg total. 10 capsule 0    temozolomide (TEMODAR) 20 MG capsule Take 1 capsule (20 mg total) by mouth once daily For days 1-5/28 day cycle with 1 other temozolomide prescription for 380 mg total. 5  capsule 0     No current facility-administered medications for this visit.      Past Surgical History:   Procedure Laterality Date    COLONOSCOPY  08/03/2009    Dr. Askew    CRANIOTOMY FOR EXCISION OF INTRACRANIAL TUMOR      CRANIOTOMY, WITH NEOPLASM EXCISION USING COMPUTER-ASSISTED NAVIGATION Right 6/13/2023    Procedure: CRANIOTOMY, WITH NEOPLASM EXCISION USING COMPUTER-ASSISTED NAVIGATION -     RIGHT FRONTAL;  Surgeon: Joao English MD;  Location: [facility] OR;  Service: Neurosurgery;  Laterality: Right;    NASAL SEPTUM SURGERY      TONSILLECTOMY        Family History   Problem Relation Name Age of Onset    Heart attack Mother      Hypertension Mother      Cancer Father          prostate     Hypertension Brother      Stroke Brother          mild       Social History     Tobacco Use    Smoking status: Never     Passive exposure: Never    Smokeless tobacco: Never   Substance Use Topics    Alcohol use: Not Currently     Alcohol/week: 14.0 standard drinks of alcohol     Types: 7 Glasses of wine, 7 Cans of beer per week    Drug use: Never      Review of Systems  KPS: 80        Objective:      Vitals:    05/02/24 1456   BP: (!) 151/88   Pulse: (!) 45           NEUROLOGICAL EXAMINATION:     MENTAL STATUS   Attention: normal. Concentration: normal.   Speech: speech is normal   Level of consciousness: alert    CRANIAL NERVES   Cranial nerves II through XII intact.     MOTOR EXAM   Muscle bulk: normal  Right arm tone: increased    Strength   Strength 5/5 throughout.        Rigidity and bradykinesia R>L  Medium-frequency, low to medium-amplitude tremor of the right hand>left, more at rest than with posture or action     SENSORY EXAM   Light touch normal.     GAIT AND COORDINATION      Coordination   Finger to nose coordination: normal       Stooped posture with mild shuffling. En bloc, multistep turn.        MRI brain from 4/25/2024 was personally visualized and compared to prior.  Per my read, this is an essentially  stable study with no definitive evidence of tumor progression.    Assessment:       Problem List Items Addressed This Visit          Oncology    Glioblastoma multiforme of brain - Primary    Relevant Medications    temozolomide (TEMODAR) 20 MG capsule    temozolomide (TEMODAR) 180 MG capsule     Other Visit Diagnoses       Parkinsonism, unspecified Parkinsonism type        Seizure        Tremor                      Plan:       Chacorta Gómez is a 78 y.o. male with a glioblastoma, MGMT-methylated, IDH-wt, WHO grade 4 s/p subtotal resection and chemoradiation to 40Gy over 15fx presenting for treatment recommendations. Tumor diagnosis was heralded by seizure.     Glioblastoma  MRI from 4/25 without evidence of POD. Laboratory evaluation is adequate for C5 TMZ. F/u in 4 weeks with CBC/CMP in anticipation of C6.    Tremor/parkinsonism  Pill rolling tremor, bradykinesia, and rigidity R>L along with stooped posture and shuffling consistent with parkinsonism. It is odd that he and wife feel this started suddenly after his surgery as he is at least a H&Y stage 2. Symptoms mildly improved on Sinemet 25-100mg 1.5 tab tid. Advised taking the medication at 8-9AM (upon awakening), noon, and 4pm, rather than morning/mid-day/bedtime to maximize coverage during the day. He has trouble taking his mid-day dose. We discussed that the medication is short-acting. He denies significant breakthrough symptoms. Will continue to tweak medicine as needed.    Seizures  Depakote 1000mg bid. Stopped Vimpat inadvertently; unclear when, but has not had additional seizures. CTM.              Visit today included increased complexity associated with the care of the episodic problem seizures, glioblastoma, tremor/parkinsonism addressed and managing the longitudinal care of the patient due to the serious and/or complex managed problem(s) glioblastoma/seizures/tremor/parkinsonism.     Lucita Macias MD  Department of Neurology  Neuro-Oncology,  Movement Disorders

## 2024-05-03 RX ORDER — TEMOZOLOMIDE 180 MG/1
360 CAPSULE ORAL DAILY
Qty: 10 CAPSULE | Refills: 0 | Status: SHIPPED | OUTPATIENT
Start: 2024-05-03 | End: 2024-05-08 | Stop reason: SDUPTHER

## 2024-05-03 RX ORDER — TEMOZOLOMIDE 20 MG/1
20 CAPSULE ORAL DAILY
Qty: 5 CAPSULE | Refills: 0 | Status: SHIPPED | OUTPATIENT
Start: 2024-05-03 | End: 2024-05-08 | Stop reason: SDUPTHER

## 2024-05-06 ENCOUNTER — TELEPHONE (OUTPATIENT)
Dept: NEUROLOGY | Facility: CLINIC | Age: 79
End: 2024-05-06
Payer: MEDICARE

## 2024-05-06 NOTE — TELEPHONE ENCOUNTER
----- Message from Tamar Morgan sent at 5/6/2024 10:23 AM CDT -----  [facility]  pharmacy calling for BSA for pt and Diagnosis on Rx  temozolomide (TEMODAR) 180 MG capsule, also is pt on a  particular chemo cycle      Call back [phone]

## 2024-05-06 NOTE — TELEPHONE ENCOUNTER
Clarified RX with Katty Acuña    temozolomide (TEMODAR) 180 MG capsule 10 capsule 0 5/3/2024 5/8/2024 No   Sig - Route: Take 2 capsules (360 mg total) by mouth once daily For days 1-5/28 day cycle with 1 other temozolomide prescription for 380 mg total. - Oral

## 2024-05-08 DIAGNOSIS — C71.9 GLIOBLASTOMA MULTIFORME OF BRAIN: ICD-10-CM

## 2024-05-10 PROBLEM — Z79.899 DRUG-INDUCED IMMUNODEFICIENCY: Status: ACTIVE | Noted: 2024-05-10

## 2024-05-10 PROBLEM — D84.821 DRUG-INDUCED IMMUNODEFICIENCY: Status: ACTIVE | Noted: 2024-05-10

## 2024-05-10 RX ORDER — TEMOZOLOMIDE 180 MG/1
360 CAPSULE ORAL DAILY
Qty: 10 CAPSULE | Refills: 0 | Status: ACTIVE | OUTPATIENT
Start: 2024-05-10 | End: 2024-06-07

## 2024-05-10 RX ORDER — TEMOZOLOMIDE 20 MG/1
20 CAPSULE ORAL DAILY
Qty: 5 CAPSULE | Refills: 0 | Status: ACTIVE | OUTPATIENT
Start: 2024-05-10 | End: 2024-06-07

## 2024-05-22 ENCOUNTER — PATIENT MESSAGE (OUTPATIENT)
Dept: NEUROLOGY | Facility: CLINIC | Age: 79
End: 2024-05-22
Payer: MEDICARE

## 2024-05-29 ENCOUNTER — LAB VISIT (OUTPATIENT)
Dept: LAB | Facility: HOSPITAL | Age: 79
End: 2024-05-29
Attending: PSYCHIATRY & NEUROLOGY
Payer: MEDICARE

## 2024-05-29 DIAGNOSIS — C71.9 GLIOBLASTOMA MULTIFORME OF BRAIN: ICD-10-CM

## 2024-05-29 LAB
ALBUMIN SERPL BCP-MCNC: 3.6 G/DL (ref 3.5–5.2)
ALP SERPL-CCNC: 38 U/L (ref 55–135)
ALT SERPL W/O P-5'-P-CCNC: 5 U/L (ref 10–44)
ANION GAP SERPL CALC-SCNC: 7 MMOL/L (ref 8–16)
AST SERPL-CCNC: 21 U/L (ref 10–40)
BASOPHILS # BLD AUTO: 0.02 K/UL (ref 0–0.2)
BASOPHILS NFR BLD: 0.3 % (ref 0–1.9)
BILIRUB SERPL-MCNC: 0.4 MG/DL (ref 0.1–1)
BUN SERPL-MCNC: 27 MG/DL (ref 8–23)
CALCIUM SERPL-MCNC: 9.5 MG/DL (ref 8.7–10.5)
CHLORIDE SERPL-SCNC: 102 MMOL/L (ref 95–110)
CO2 SERPL-SCNC: 32 MMOL/L (ref 23–29)
CREAT SERPL-MCNC: 0.8 MG/DL (ref 0.5–1.4)
DIFFERENTIAL METHOD BLD: ABNORMAL
EOSINOPHIL # BLD AUTO: 0.2 K/UL (ref 0–0.5)
EOSINOPHIL NFR BLD: 3.6 % (ref 0–8)
ERYTHROCYTE [DISTWIDTH] IN BLOOD BY AUTOMATED COUNT: 13.4 % (ref 11.5–14.5)
EST. GFR  (NO RACE VARIABLE): >60 ML/MIN/1.73 M^2
GLUCOSE SERPL-MCNC: 122 MG/DL (ref 70–110)
HCT VFR BLD AUTO: 38 % (ref 40–54)
HGB BLD-MCNC: 13.2 G/DL (ref 14–18)
IMM GRANULOCYTES # BLD AUTO: 0.03 K/UL (ref 0–0.04)
IMM GRANULOCYTES NFR BLD AUTO: 0.5 % (ref 0–0.5)
LYMPHOCYTES # BLD AUTO: 1.7 K/UL (ref 1–4.8)
LYMPHOCYTES NFR BLD: 27.3 % (ref 18–48)
MCH RBC QN AUTO: 34.2 PG (ref 27–31)
MCHC RBC AUTO-ENTMCNC: 34.7 G/DL (ref 32–36)
MCV RBC AUTO: 98 FL (ref 82–98)
MONOCYTES # BLD AUTO: 0.7 K/UL (ref 0.3–1)
MONOCYTES NFR BLD: 12.2 % (ref 4–15)
NEUTROPHILS # BLD AUTO: 3.4 K/UL (ref 1.8–7.7)
NEUTROPHILS NFR BLD: 56.1 % (ref 38–73)
NRBC BLD-RTO: 0 /100 WBC
PLATELET # BLD AUTO: 148 K/UL (ref 150–450)
PMV BLD AUTO: 10.9 FL (ref 9.2–12.9)
POTASSIUM SERPL-SCNC: 3.9 MMOL/L (ref 3.5–5.1)
PROT SERPL-MCNC: 6.1 G/DL (ref 6–8.4)
RBC # BLD AUTO: 3.86 M/UL (ref 4.6–6.2)
SODIUM SERPL-SCNC: 141 MMOL/L (ref 136–145)
WBC # BLD AUTO: 6.08 K/UL (ref 3.9–12.7)

## 2024-05-29 PROCEDURE — 80053 COMPREHEN METABOLIC PANEL: CPT | Performed by: PSYCHIATRY & NEUROLOGY

## 2024-05-29 PROCEDURE — 85025 COMPLETE CBC W/AUTO DIFF WBC: CPT | Performed by: PSYCHIATRY & NEUROLOGY

## 2024-05-29 PROCEDURE — 36415 COLL VENOUS BLD VENIPUNCTURE: CPT | Mod: PO | Performed by: PSYCHIATRY & NEUROLOGY

## 2024-05-30 ENCOUNTER — OFFICE VISIT (OUTPATIENT)
Dept: NEUROLOGY | Facility: CLINIC | Age: 79
End: 2024-05-30
Payer: MEDICARE

## 2024-05-30 DIAGNOSIS — C71.9 GLIOBLASTOMA MULTIFORME OF BRAIN: Primary | ICD-10-CM

## 2024-05-30 DIAGNOSIS — R25.1 TREMOR: ICD-10-CM

## 2024-05-30 DIAGNOSIS — R56.9 SEIZURE: ICD-10-CM

## 2024-05-30 DIAGNOSIS — R53.83 FATIGUE, UNSPECIFIED TYPE: ICD-10-CM

## 2024-05-30 DIAGNOSIS — R63.4 WEIGHT LOSS: ICD-10-CM

## 2024-05-30 DIAGNOSIS — G20.C PARKINSONISM, UNSPECIFIED PARKINSONISM TYPE: ICD-10-CM

## 2024-05-30 PROCEDURE — 99215 OFFICE O/P EST HI 40 MIN: CPT | Mod: 95,,, | Performed by: PSYCHIATRY & NEUROLOGY

## 2024-05-30 PROCEDURE — G2211 COMPLEX E/M VISIT ADD ON: HCPCS | Mod: 95,,, | Performed by: PSYCHIATRY & NEUROLOGY

## 2024-05-30 NOTE — PROGRESS NOTES
Subjective:       Patient ID: Chacorta Gómez is a 78 y.o. male.    Chief Complaint: glioblastoma    HPI  Oncologic History:   2/2023: admitted to outside hospital for new onset seizure, he was started on keppra. MRI brain was done and was reportedly suspicious for subacute infarct.   4-5/2023: was in [facility] for a work trip, had recurrent seizures, was hospitalized there for 10 days.   6/2023: hospitalized for breakthrough seizures, lacosamide was added, underwent repeat MRI brain which showed 4.2 x 2.6 x 4.1 cm heterogeneous enhancing hemorrhagic medial right frontal lesion. A 2nd lesion along the planum sphenoidale has a dural base but appears to also involve the inferior frontal lobe parenchyma. Patient was seen by neurosurgery and underwent subtotal resection. Pathology consistent with GBM.   8/8-28/2023: chemoradiation to 40Gy over 15fx (Alexa Lisa)  10/2023: C1 TMZ @150mg/m2  12/2023: C2 TMZ @200mg/m2  2/7-11/2024: C3 TMZ @200mg/m2  3/28-4/1/2024: C4 TMZ (reportedly did not get enough of one of the pill strengths from the pharmacy, so took his full dose of 390mg days 1-2 and only 140mg days 3-5)  5/15-19/2024: C5 TMZ @200mg/m2    Chacorta is feeling ok. He tolerated the most recent cycle of TMZ well. His tremor is still bothersome; he is taking Sinemet 25-100mg 2 tabs in the AM and 1 in the PM. He has persistent fatigue and low stamina. He continues to work with PT.    Past Medical History:   Diagnosis Date    Benign localized hyperplasia of prostate with urinary obstruction and other lower urinary tract symptoms (LUTS)(600.21)     Brain lesion     CVA (cerebral vascular accident)     [facility]    Essential hypertension 07/12/2016    Glaucoma     bilat    Multinodular goiter     Seizures     Spontaneous ecchymoses     Unspecified essential hypertension       Current Outpatient Medications   Medication Sig Dispense Refill    carbidopa-levodopa  mg (SINEMET)  mg per tablet Take 1 tablet by  mouth 3 (three) times daily. 90 tablet 11    chlorpheniramine (CHLOR-TRIMETON) 4 mg tablet Take 4 mg by mouth daily as needed (sneezing attacks).      divalproex (DEPAKOTE) 500 MG TbEC Take 2 tablets (1,000 mg total) by mouth every 12 (twelve) hours. 120 tablet 2    dorzolamide-timolol 2-0.5% (COSOPT) 22.3-6.8 mg/mL ophthalmic solution Place 1 drop into both eyes 2 (two) times daily.      famotidine (PEPCID) 20 MG tablet Take 20 mg by mouth daily as needed for Heartburn.      folic acid (FOLVITE) 1 MG tablet Take 1 tablet (1 mg total) by mouth once daily. 90 tablet 3    furosemide (LASIX) 20 MG tablet Take 1 tablet (20 mg total) by mouth daily as needed (swelling). 90 tablet 1    hydroCHLOROthiazide (HYDRODIURIL) 12.5 MG Tab Take 1 tablet (12.5 mg total) by mouth once daily. 90 tablet 3    HYDROcodone-acetaminophen (NORCO) 5-325 mg per tablet Take 1 tablet by mouth every 6 (six) hours as needed for Pain. 120 tablet 0    latanoprost 0.005 % ophthalmic solution Place 1 drop into both eyes every evening.      loratadine (CLARITIN) 10 mg tablet Take 10 mg by mouth once daily.      multivitamin Tab Take 1 tablet by mouth once daily. 30 tablet 0    naproxen sodium (ALEVE) 220 mg Cap Take by mouth.      omega 3-dha-epa-fish oil (FISH OIL) 1,000 mg (120 mg-180 mg) Cap Take 1 capsule by mouth Daily.      ondansetron (ZOFRAN) 8 MG tablet Take 8mg (1 tab) 30-45 minutes prior to chemotherapy dose and every 8 hours as needed 30 tablet 3    temozolomide (TEMODAR) 180 MG capsule Take 2 capsules (360 mg total) by mouth once daily For days 1-5/28 day cycle with 1 other temozolomide prescription for 380 mg total. 10 capsule 0    temozolomide (TEMODAR) 20 MG capsule Take 1 capsule (20 mg total) by mouth once daily For days 1-5/28 day cycle with 1 other temozolomide prescription for 380 mg total. 5 capsule 0    thiamine 100 MG tablet Take 100 mg by mouth once daily.       No current facility-administered medications for this visit.       Past Surgical History:   Procedure Laterality Date    COLONOSCOPY  08/03/2009    Dr. Askew    CRANIOTOMY FOR EXCISION OF INTRACRANIAL TUMOR      CRANIOTOMY, WITH NEOPLASM EXCISION USING COMPUTER-ASSISTED NAVIGATION Right 6/13/2023    Procedure: CRANIOTOMY, WITH NEOPLASM EXCISION USING COMPUTER-ASSISTED NAVIGATION -     RIGHT FRONTAL;  Surgeon: Joao English MD;  [facility]: [facility];  Service: Neurosurgery;  Laterality: Right;    NASAL SEPTUM SURGERY      TONSILLECTOMY        Family History   Problem Relation Name Age of Onset    Heart attack Mother      Hypertension Mother      Cancer Father          prostate     Hypertension Brother      Stroke Brother          mild       Social History     Tobacco Use    Smoking status: Never     Passive exposure: Never    Smokeless tobacco: Never   Substance Use Topics    Alcohol use: Not Currently     Alcohol/week: 14.0 standard drinks of alcohol     Types: 7 Glasses of wine, 7 Cans of beer per week    Drug use: Never      Review of Systems  KPS: 80        Objective:      There were no vitals filed for this visit.  Telehealth visit    (Prior)  NEUROLOGICAL EXAMINATION:     MENTAL STATUS   Attention: normal. Concentration: normal.   Speech: speech is normal   Level of consciousness: alert    CRANIAL NERVES   Cranial nerves II through XII intact.     MOTOR EXAM   Muscle bulk: normal  Right arm tone: increased    Strength   Strength 5/5 throughout.        Rigidity and bradykinesia R>L  Medium-frequency, low to medium-amplitude tremor of the right hand>left, more at rest than with posture or action     SENSORY EXAM   Light touch normal.     GAIT AND COORDINATION      Coordination   Finger to nose coordination: normal       Stooped posture with mild shuffling. En bloc, multistep turn.        MRI brain from 4/25/2024 was personally visualized and compared to prior.  Per my read, this is an essentially stable study with no definitive evidence of tumor progression.    Assessment:        Problem List Items Addressed This Visit    None              Plan:       Chacorta Gómez is a 78 y.o. male with a glioblastoma, MGMT-methylated, IDH-wt, WHO grade 4 s/p subtotal resection and chemoradiation to 40Gy over 15fx presenting for treatment recommendations. Tumor diagnosis was heralded by seizure.     Glioblastoma  On C5D15. Repeat labs at end of C5; if adequate, will prescribe C6 TMZ @200mg/m2. F/u after C6 with CBC/CMP and MRI brain.    Weight loss  Discussed optimizing nutrition, considering adding protein shakes    Fatigue  Likely multifactorial due to weight loss, chemotherapy, deconditioning. Discussed contributors. Continue PT and regular activity.    Tremor/parkinsonism  Pill rolling tremor, bradykinesia, and rigidity R>L along with stooped posture and shuffling consistent with parkinsonism. It is odd that he and wife feel this started suddenly after his surgery as he is at least a H&Y stage 2. Symptoms mildly improved on Sinemet 25-100mg 1.5 tab tid. Advised taking the medication at 8-9AM (upon awakening), noon, and 4pm, rather than morning/mid-day/bedtime to maximize coverage during the day. He has trouble taking his mid-day dose. We discussed that the medication is short-acting. He denies significant breakthrough symptoms. Will continue to tweak medicine as needed.    Seizures  Depakote 1000mg bid. Stopped Vimpat inadvertently; unclear when, but has not had additional seizures. CTM.          The patient location is: home in [place]  The chief complaint leading to consultation is: glioblastoma    Visit type: audiovisual    Face to Face time with patient: 15 min    Visit today included increased complexity associated with the care of the episodic problem seizures, glioblastoma, tremor/parkinsonism addressed and managing the longitudinal care of the patient due to the serious and/or complex managed problem(s) glioblastoma/seizures/tremor/parkinsonism.     Lucita Macias MD  Department of  Neurology  Neuro-Oncology, Movement Disorders

## 2024-06-07 ENCOUNTER — LAB VISIT (OUTPATIENT)
Dept: LAB | Facility: HOSPITAL | Age: 79
End: 2024-06-07
Attending: PSYCHIATRY & NEUROLOGY
Payer: MEDICARE

## 2024-06-07 DIAGNOSIS — C71.9 GLIOBLASTOMA MULTIFORME OF BRAIN: ICD-10-CM

## 2024-06-07 LAB
ALBUMIN SERPL BCP-MCNC: 3.5 G/DL (ref 3.5–5.2)
ALP SERPL-CCNC: 36 U/L (ref 55–135)
ALT SERPL W/O P-5'-P-CCNC: <5 U/L (ref 10–44)
ANION GAP SERPL CALC-SCNC: 9 MMOL/L (ref 8–16)
AST SERPL-CCNC: 21 U/L (ref 10–40)
BASOPHILS # BLD AUTO: 0.01 K/UL (ref 0–0.2)
BASOPHILS NFR BLD: 0.2 % (ref 0–1.9)
BILIRUB SERPL-MCNC: 0.4 MG/DL (ref 0.1–1)
BUN SERPL-MCNC: 29 MG/DL (ref 8–23)
CALCIUM SERPL-MCNC: 9.5 MG/DL (ref 8.7–10.5)
CHLORIDE SERPL-SCNC: 102 MMOL/L (ref 95–110)
CO2 SERPL-SCNC: 31 MMOL/L (ref 23–29)
CREAT SERPL-MCNC: 0.8 MG/DL (ref 0.5–1.4)
DIFFERENTIAL METHOD BLD: ABNORMAL
EOSINOPHIL # BLD AUTO: 0.4 K/UL (ref 0–0.5)
EOSINOPHIL NFR BLD: 7 % (ref 0–8)
ERYTHROCYTE [DISTWIDTH] IN BLOOD BY AUTOMATED COUNT: 13.7 % (ref 11.5–14.5)
EST. GFR  (NO RACE VARIABLE): >60 ML/MIN/1.73 M^2
GLUCOSE SERPL-MCNC: 77 MG/DL (ref 70–110)
HCT VFR BLD AUTO: 36.2 % (ref 40–54)
HGB BLD-MCNC: 12.2 G/DL (ref 14–18)
IMM GRANULOCYTES # BLD AUTO: 0.01 K/UL (ref 0–0.04)
IMM GRANULOCYTES NFR BLD AUTO: 0.2 % (ref 0–0.5)
LYMPHOCYTES # BLD AUTO: 1.7 K/UL (ref 1–4.8)
LYMPHOCYTES NFR BLD: 33.8 % (ref 18–48)
MCH RBC QN AUTO: 33.8 PG (ref 27–31)
MCHC RBC AUTO-ENTMCNC: 33.7 G/DL (ref 32–36)
MCV RBC AUTO: 100 FL (ref 82–98)
MONOCYTES # BLD AUTO: 1 K/UL (ref 0.3–1)
MONOCYTES NFR BLD: 19.4 % (ref 4–15)
NEUTROPHILS # BLD AUTO: 2 K/UL (ref 1.8–7.7)
NEUTROPHILS NFR BLD: 39.4 % (ref 38–73)
NRBC BLD-RTO: 0 /100 WBC
PLATELET # BLD AUTO: 70 K/UL (ref 150–450)
PMV BLD AUTO: 11.1 FL (ref 9.2–12.9)
POTASSIUM SERPL-SCNC: 3.9 MMOL/L (ref 3.5–5.1)
PROT SERPL-MCNC: 6 G/DL (ref 6–8.4)
RBC # BLD AUTO: 3.61 M/UL (ref 4.6–6.2)
SODIUM SERPL-SCNC: 142 MMOL/L (ref 136–145)
WBC # BLD AUTO: 5 K/UL (ref 3.9–12.7)

## 2024-06-07 PROCEDURE — 85025 COMPLETE CBC W/AUTO DIFF WBC: CPT | Performed by: PSYCHIATRY & NEUROLOGY

## 2024-06-07 PROCEDURE — 80053 COMPREHEN METABOLIC PANEL: CPT | Performed by: PSYCHIATRY & NEUROLOGY

## 2024-06-07 PROCEDURE — 36415 COLL VENOUS BLD VENIPUNCTURE: CPT | Mod: PO | Performed by: PSYCHIATRY & NEUROLOGY

## 2024-06-10 ENCOUNTER — TELEPHONE (OUTPATIENT)
Dept: NEUROLOGY | Facility: CLINIC | Age: 79
End: 2024-06-10
Payer: MEDICARE

## 2024-06-10 DIAGNOSIS — C71.9 GLIOBLASTOMA MULTIFORME OF BRAIN: ICD-10-CM

## 2024-06-10 RX ORDER — TEMOZOLOMIDE 180 MG/1
360 CAPSULE ORAL DAILY
Qty: 10 CAPSULE | Refills: 0 | OUTPATIENT
Start: 2024-06-10 | End: 2024-06-15

## 2024-06-10 RX ORDER — TEMOZOLOMIDE 20 MG/1
20 CAPSULE ORAL DAILY
Qty: 5 CAPSULE | Refills: 0 | OUTPATIENT
Start: 2024-06-10 | End: 2024-06-15

## 2024-06-18 ENCOUNTER — LAB VISIT (OUTPATIENT)
Dept: LAB | Facility: HOSPITAL | Age: 79
End: 2024-06-18
Attending: PSYCHIATRY & NEUROLOGY
Payer: MEDICARE

## 2024-06-18 DIAGNOSIS — C71.9 GLIOBLASTOMA MULTIFORME OF BRAIN: ICD-10-CM

## 2024-06-18 LAB
BASOPHILS # BLD AUTO: 0.02 K/UL (ref 0–0.2)
BASOPHILS NFR BLD: 0.3 % (ref 0–1.9)
DIFFERENTIAL METHOD BLD: ABNORMAL
EOSINOPHIL # BLD AUTO: 0.3 K/UL (ref 0–0.5)
EOSINOPHIL NFR BLD: 5 % (ref 0–8)
ERYTHROCYTE [DISTWIDTH] IN BLOOD BY AUTOMATED COUNT: 14.6 % (ref 11.5–14.5)
HCT VFR BLD AUTO: 37.8 % (ref 40–54)
HGB BLD-MCNC: 12.7 G/DL (ref 14–18)
IMM GRANULOCYTES # BLD AUTO: 0.02 K/UL (ref 0–0.04)
IMM GRANULOCYTES NFR BLD AUTO: 0.3 % (ref 0–0.5)
LYMPHOCYTES # BLD AUTO: 2.2 K/UL (ref 1–4.8)
LYMPHOCYTES NFR BLD: 35.2 % (ref 18–48)
MCH RBC QN AUTO: 33 PG (ref 27–31)
MCHC RBC AUTO-ENTMCNC: 33.6 G/DL (ref 32–36)
MCV RBC AUTO: 98 FL (ref 82–98)
MONOCYTES # BLD AUTO: 0.8 K/UL (ref 0.3–1)
MONOCYTES NFR BLD: 12.1 % (ref 4–15)
NEUTROPHILS # BLD AUTO: 3 K/UL (ref 1.8–7.7)
NEUTROPHILS NFR BLD: 47.1 % (ref 38–73)
NRBC BLD-RTO: 0 /100 WBC
PLATELET # BLD AUTO: 126 K/UL (ref 150–450)
PMV BLD AUTO: 10.1 FL (ref 9.2–12.9)
RBC # BLD AUTO: 3.85 M/UL (ref 4.6–6.2)
WBC # BLD AUTO: 6.37 K/UL (ref 3.9–12.7)

## 2024-06-18 PROCEDURE — 36415 COLL VENOUS BLD VENIPUNCTURE: CPT | Mod: PO | Performed by: PSYCHIATRY & NEUROLOGY

## 2024-06-18 PROCEDURE — 85025 COMPLETE CBC W/AUTO DIFF WBC: CPT | Performed by: PSYCHIATRY & NEUROLOGY

## 2024-06-19 DIAGNOSIS — C71.9 GLIOBLASTOMA DETERMINED BY BIOPSY OF BRAIN: Primary | ICD-10-CM

## 2024-06-19 RX ORDER — TEMOZOLOMIDE 180 MG/1
360 CAPSULE ORAL DAILY
Qty: 10 CAPSULE | Refills: 0 | Status: ACTIVE | OUTPATIENT
Start: 2024-06-19 | End: 2024-07-18

## 2024-06-19 NOTE — PROGRESS NOTES
Sent Temodar 180 mg capsule for a dose of 2 tablets (360 mg) for 5 days to [facility], left voicemail for him

## 2024-06-20 ENCOUNTER — OFFICE VISIT (OUTPATIENT)
Dept: PALLIATIVE MEDICINE | Facility: CLINIC | Age: 79
End: 2024-06-20
Payer: MEDICARE

## 2024-06-20 ENCOUNTER — TELEPHONE (OUTPATIENT)
Dept: NEUROLOGY | Facility: CLINIC | Age: 79
End: 2024-06-20
Payer: MEDICARE

## 2024-06-20 ENCOUNTER — PATIENT MESSAGE (OUTPATIENT)
Dept: NEUROLOGY | Facility: CLINIC | Age: 79
End: 2024-06-20
Payer: MEDICARE

## 2024-06-20 DIAGNOSIS — R53.0 NEOPLASTIC MALIGNANT RELATED FATIGUE: ICD-10-CM

## 2024-06-20 DIAGNOSIS — M47.816 OSTEOARTHRITIS OF LUMBAR SPINE, UNSPECIFIED SPINAL OSTEOARTHRITIS COMPLICATION STATUS: Primary | ICD-10-CM

## 2024-06-20 DIAGNOSIS — R25.1 TREMOR: ICD-10-CM

## 2024-06-20 PROCEDURE — 99215 OFFICE O/P EST HI 40 MIN: CPT | Mod: 95,,, | Performed by: STUDENT IN AN ORGANIZED HEALTH CARE EDUCATION/TRAINING PROGRAM

## 2024-06-20 PROCEDURE — 1101F PT FALLS ASSESS-DOCD LE1/YR: CPT | Mod: CPTII,95,, | Performed by: STUDENT IN AN ORGANIZED HEALTH CARE EDUCATION/TRAINING PROGRAM

## 2024-06-20 PROCEDURE — 1159F MED LIST DOCD IN RCRD: CPT | Mod: CPTII,95,, | Performed by: STUDENT IN AN ORGANIZED HEALTH CARE EDUCATION/TRAINING PROGRAM

## 2024-06-20 PROCEDURE — 3288F FALL RISK ASSESSMENT DOCD: CPT | Mod: CPTII,95,, | Performed by: STUDENT IN AN ORGANIZED HEALTH CARE EDUCATION/TRAINING PROGRAM

## 2024-06-20 RX ORDER — CARBIDOPA AND LEVODOPA 25; 100 MG/1; MG/1
1 TABLET ORAL 3 TIMES DAILY
Qty: 270 TABLET | Refills: 3 | Status: SHIPPED | OUTPATIENT
Start: 2024-06-20 | End: 2025-06-20

## 2024-06-20 NOTE — TELEPHONE ENCOUNTER
Called patient to inform that TMZ medication [facility]

## 2024-06-20 NOTE — PROGRESS NOTES
Palliative Medicine Clinic Note        Consult Requested By: No ref. provider found      Reason for Consult/Chief Complaint: Glibolastoma multiforme, worsening back pain      The patient location is: [place], [place]  The chief complaint leading to consultation is: Fatigue    Visit type: audiovisual    Face to Face time with patient: 11  40 minutes of total time spent on the encounter, which includes face to face time and non-face to face time preparing to see the patient (eg, review of tests), Obtaining and/or reviewing separately obtained history, Documenting clinical information in the electronic or other health record, Independently interpreting results (not separately reported) and communicating results to the patient/family/caregiver, or Care coordination (not separately reported).       Each patient provided with medical services by telemedicine is:  (1) informed of the relationship between the physician and patient and the respective role of any other health care provider with respect to management of the patient; and (2) notified that he or she may decline to receive medical services by telemedicine and may withdraw from such care at any time.           ASSESSMENT/PLAN:      Plan/Recommendations:  Chacorta was seen today for fatigue.    Diagnoses and all orders for this visit:    Osteoarthritis of lumbar spine, unspecified spinal osteoarthritis complication status  -     Ambulatory referral/consult to [facility] Sp[facility]; Future    Tremor  -     carbidopa-levodopa  mg (SINEMET)  mg per tablet; Take 1 tablet by mouth 3 (three) times daily.            Glioblastoma Multiforme  - S/p subtotal resection with NSGY and adjuvant radiation therapy in 2023  - Follows with Dr. Macias, on tezolomide  - Repeat MRI q2mo  - Discussed that cancer likely to recur or worsen at some point, though we hope his current period of relatively minimal symptoms continues for quite some time    Assessment & Plan    PARKINSON'S  DISEASE:  - Ordered a refill of Carbidopa-Levodopa for the patient's Parkinson's disease.    FATIGUE MANAGEMENT:  - Discussed the potential side effects and benefits of stimulant medications in managing fatigue.  - Advised the patient that these may be prescribed if his fatigue worsens.    PAIN MANAGEMENT:  - Prescribed hydrocodone tablets for pain management.  - Instructed the patient on safe usage and explained the potential risks of opioids.    LOWER BACK PAIN:  - Referred the patient to a back and spine specialist for his lower back pain.    MENTAL HEALTH:  - Mentioned the possibility of involving a counselor or therapist if the patient's mood worsens.  - Explained the role of caregivers and the importance of seeking help when needed.    FOLLOW-UP AND COMMUNICATION:  - Planned to see the patient again in 2 months.  - Advised the patient to reach out if there are any changes in his condition or if he needs anything in the meantime.              Advance Care Planning   Advance Directives:   Agent's Name:  Esperanza Gómez   Agent's Contact Number:  [phone]    Decision Making:  Patient answered questions  Goals of Care: The patient endorses that what is most important right now is to focus on remaining as independent as possible and symptom/pain control    Accordingly, we have decided that the best plan to meet the patient's goals includes continuing with treatment                   Follow up: 2 months     Plan discussed with: IDYAZAN             SUBJECTIVE:          History of Present Illness / Interval History:  Chacorta Gómez is 78 y.o. year old male presenting with Glioblastoma Multiforme s/p subtotal resection with NSGY and adjuvant radiation therapy.  Referred to Palliative Care for physical symptoms, advance care planning,, and additional support..    04/18/2024: The patient with Parkinson's disease and lymphedema has been attending rehab almost daily for the past month to improve leg strength and balance.  The patient reports feeling uneasy while walking but acknowledges some improvement in the condition due to rehab. The rehab also included lymphatic drainage techniques for swollen feet, which have helped reduce the swelling significantly, although ankles remain swollen. . Dr. Boswell recently adjusted the Sinemet (Carbidopa Levodopa) dosage, a medication for Parkinson's, to help the patient feel less stiff and move more. The patient also mentioned restarting chemotherapy medication but had an issue with the number of capsules received. The patient is currently out of chemotherapy medication and is unsure if more should have been received. . The patient also ran out of Divalproex (Depakote) medication, which is used to prevent seizures. The patient reports feeling fatigued, which interferes with activities to some extent. The patient gets short of breath after a lot of walking and needs to take a nap afterward. Despite this, the patient was able to go shopping at [employer], pushing a basket around the store. . The patient also reports a persistent tremor in the hands, which has not improved and affects the ability to paint, an activity previously enjoyed. 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 The patient also mentioned depending on the spouse for many tasks that were previously done independently.       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 He was hospitalized in [place] for 10 days. After returning to the [place], an MRI revealed a brain tumor, which was diagnosed as glioblastoma. His main concerns are the tremors in his hands, which make it difficult for him to paint and write, and  "worsening lower back pain. He has tried Parkinson's medication with minimal improvement in the tremors. He has a history of degenerative spine issues in the lower back. He is currently undergoing rehab and has recently started treatment for lymphedema. He denies any chest pain, shortness of breath, or left leg numbness. He denies any history of diabetes, hypertension, or prostate cancer.    GOALS OF CARE/ADVANCED DIRECTIVES:  - He is primarily concerned about his hand tremors and back pain, which are currently inhibiting his ability to paint, a significant source of celeste and purpose in his life.  - His wife is his primary caregiver and the person he would want to make medical decisions for him if he were unable to do so himself.  - He has other relatives who could potentially provide support if needed, but they live in [place], which is a distance away from his home.  - He expressed concern about needing home care in the future, particularly in relation to managing his medications.    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 He reports doing "pretty good" overall and confirms he will be restarting Temodar (temozolomide) capsules for his cancer treatment. The patient previously experienced some nausea with the first round of Temodar but was able to manage it by taking an anti-nausea pill before the chemotherapy. He denies any other significant side effects from " "the Temodar. The patient reports experiencing lower back pain, particularly when walking, which makes him move slowly and interferes with his ability to do things he wants to do around the house. However, he notes that the pain is absent when sitting down. The patient has been taking hydrocodone tablets and Aleve (naproxen) as needed for pain management, but hesitates to take the hydrocodone often due to concerns about addiction. The patient expresses that fatigue is bothersome, causing him to feel sleepy all the time. He has been relying on [employer] energy drinks to help combat the fatigue and frequently takes naps. The patient's tremor has been impacting his ability to engage in his usual art activities, but he has been trying to work around the limitations as best he can. Regarding his mood, the patient reports feeling "a little depressed" about not being able to do everything he could before, but he does not feel it's significantly depressing him and does not feel the need for additional counseling or medication for his mood at this time. The patient lives at home with his wife, Esperanza, who has taken on more responsibilities since his health issues began. They have discussed the potential need for home healthcare if his condition worsens, but they are unsure about the resources available and how to pay for it, given their current reliance on Social Security and income from the patient's paintings. The patient expresses a desire to return to work and his usual painting schedule, as he was accustomed to having a lot of energy before his illness. The patient denies any pain or trouble breathing. He denies experiencing chest pain or fever in relation to his current respiratory symptoms.    GOALS OF CARE/ADVANCED DIRECTIVES:  - The patient lives at home with his wife, Esperanza. They have discussed the potential need for home healthcare if his wife becomes unable to handle all of his care needs alone. However, they have " "not yet identified a clear plan or resources for obtaining this additional support.  - Brother is currently hospitalized and unable to provide assistance. He has a sister who could potentially offer some help. His nephews and nieces live in [place] or elsewhere, limiting their ability to provide regular support.  - The patient's income has been affected by his inability to work, as they are primarily relying on Social Security and income from his   paintings. Financial considerations are a concern when planning for potential home healthcare needs.  - The patient and his wife have not yet established a clear plan or identified resources for obtaining additional caregiving support if needed in the future.    ACTIVITIES OF DAILY LIVING:  - Patient is able to drive now after previously being unable to drive following a stroke and seizure. During that time, he received assistance from friends for transportation.  - Patient experiences significant fatigue, stating "I feel sleepy all the time." He takes frequent naps and uses [employer] energy drinks to help combat fatigue.  - Patient has had to make adjustments to his daily activities and routines due to decreased energy levels compared to before.  - Patient is eager to return to his regular work and painting schedule, which has been impacted by his current health status.  - Patient experiences lower back pain that interferes with his mobility. He reports no pain while sitting, but experiences significant pain when walking that causes him to move slowly.    MEDICATIONS:  - Temodar, capsules, for cancer treatment  - Temozolamide, capsules, for cancer treatment  - Nausea medicine, taken before Temodar/Temozolamide, for nausea  - Carbidopa/levodopa, prescribed by Dr. Boswell, for Parkinson's  - Hydrocodone, tablets, taken as needed, oral, for pain  - Aleve, taken as needed, for pain    MEDICAL HISTORY:  - Glioblastoma of the brain  - Parkinson's disease  - Stroke  - " Seizure    FAMILY HISTORY:  - Brother: Currently in the hospital    SOCIAL HISTORY:  -  and lives at home with his wife Esperanza Decker artist who paints and sells his work  - Eager to get back to painting  - Income has been affected due to not working now due to his health condition           ROS:  Review of Systems    Review of Symptoms      Symptom Assessment (ESAS 0-10 Scale)  Pain:  0  Dyspnea:  0  Anxiety:  0  Nausea:  0  Depression:  0  Anorexia:  0  Fatigue:  6  Insomnia:  0  Restlessness:  0  Agitation:  0     Constipation:  Negative  Diarrhea:  Negative      Pain Assessment:    Location(s): back    Back       Location: lower and bilateral        Quality: Dull and sharp        Quantity: 6/10 in intensity        Chronicity: Onset 2 year(s) ago, gradually worsening        Aggravating Factors: Movement        Alleviating Factors: Acetaminophen and NSAIDs       Associated Symptoms: None    Performance Status:  70    Living Arrangements:  Lives with spouse and Lives in home    Psychosocial/Cultural:   See Palliative Psychosocial Note: Yes  -  and lives at home with his wife Esperanza Decker artbi who paints and sells his work; 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- Eager to get back to [employer] - Income has been affected due to not working now due to his health condition     **Primary  to Follow**  Palliative Care  Consult: Yes          External  database queried on 06/20/2024  by Shon Jett     Medications:    Current Outpatient Medications:     chlorpheniramine (CHLOR-TRIMETON) 4 mg tablet, Take 4 mg by mouth daily as needed (sneezing attacks)., Disp: , Rfl:     divalproex (DEPAKOTE) 500 MG TbEC, Take 2 tablets (1,000 mg total) by mouth every 12 (twelve) hours., Disp: 120 tablet, Rfl: 2    dorzolamide-timolol 2-0.5% (COSOPT) 22.3-6.8 mg/mL ophthalmic solution, Place 1 drop into both eyes 2 (two) times daily., Disp: , Rfl:     famotidine (PEPCID) 20 MG tablet, Take  20 mg by mouth daily as needed for Heartburn., Disp: , Rfl:     folic acid (FOLVITE) 1 MG tablet, Take 1 tablet (1 mg total) by mouth once daily., Disp: 90 tablet, Rfl: 3    furosemide (LASIX) 20 MG tablet, Take 1 tablet (20 mg total) by mouth daily as needed (swelling)., Disp: 90 tablet, Rfl: 1    hydroCHLOROthiazide (HYDRODIURIL) 12.5 MG Tab, Take 1 tablet (12.5 mg total) by mouth once daily., Disp: 90 tablet, Rfl: 3    HYDROcodone-acetaminophen (NORCO) 5-325 mg per tablet, Take 1 tablet by mouth every 6 (six) hours as needed for Pain., Disp: 120 tablet, Rfl: 0    latanoprost 0.005 % ophthalmic solution, Place 1 drop into both eyes every evening., Disp: , Rfl:     loratadine (CLARITIN) 10 mg tablet, Take 10 mg by mouth once daily., Disp: , Rfl:     multivitamin Tab, Take 1 tablet by mouth once daily., Disp: 30 tablet, Rfl: 0    naproxen sodium (ALEVE) 220 mg Cap, Take by mouth., Disp: , Rfl:     omega 3-dha-epa-fish oil (FISH OIL) 1,000 mg (120 mg-180 mg) Cap, Take 1 capsule by mouth Daily., Disp: , Rfl:     ondansetron (ZOFRAN) 8 MG tablet, Take 8mg (1 tab) 30-45 minutes prior to chemotherapy dose and every 8 hours as needed, Disp: 30 tablet, Rfl: 3    temozolomide (TEMODAR) 180 MG capsule, Take 2 capsules (360 mg total) by mouth once daily on days 1-5 of a 28-day chemotherapy cycle., Disp: 10 capsule, Rfl: 0    thiamine 100 MG tablet, Take 100 mg by mouth once daily., Disp: , Rfl:     carbidopa-levodopa  mg (SINEMET)  mg per tablet, Take 1 tablet by mouth 3 (three) times daily., Disp: 270 tablet, Rfl: 3    Review of patient's allergies indicates:  No Known Allergies        OBJECTIVE:         Physical Exam:  Vitals:    Physical ExamPhysical Exam            Unable to perform full physical exam due to telemedicine visit.  Limited exam:  Gen: NAD; pleasant and engaged conversation; no signs of discomfort  Non diaphoretic  Speech normal  No increased work of breathing  No cyanosis  Able to walk around  living room without difficulty        I spent a total of 40 minutes on the day of the visit. This includes face to face time in discussion of goals of care, symptom assessment, coordination of care and emotional support.  This also includes non-face to face time preparing to see the patient (eg, review of tests/imaging), obtaining and/or reviewing separately obtained history, documenting clinical information in the electronic or other health record, independently interpreting results and communicating results to the patient/family/caregiver, or care coordinator.       Additional 5 min time spent on a voluntary advance care planning and /or goals of care discussion, providing emotional support, formulating and communicating prognosis and exploring burden/benefit of various approaches of treatment.     This note was generated with the assistance of ambient listening technology. Verbal consent was obtained by the patient and accompanying visitor(s) for the recording of patient appointment to facilitate this note. I attest to having reviewed and edited the generated note for accuracy, though some syntax or spelling errors may persist. Please contact the author of this note for any clarification.

## 2024-06-20 NOTE — Clinical Note
Please email him some of the list of caregiver vetting services that we have. He is aware that these are generally out of pocket. He is medicare, so not sure if there is any services from [employer] on [employer] that could be helpful if his wife needs help with caregiving.

## 2024-06-24 ENCOUNTER — TELEPHONE (OUTPATIENT)
Dept: NEUROSURGERY | Facility: CLINIC | Age: 79
End: 2024-06-24
Payer: MEDICARE

## 2024-06-24 NOTE — TELEPHONE ENCOUNTER
----- Message from Yolande Peterson LPN sent at 6/24/2024 11:15 AM CDT -----    ----- Message -----  From: Emma Alexis  Sent: 6/21/2024   4:28 PM CDT  To: Yolande Peterson LPN      ----- Message -----  From: Kala Belle  Sent: 6/20/2024   5:01 PM CDT  To: Nena Gauthier Staff    Type:  Patient Returning Call    Who Called:Pt   Who Left Message for Patient: Yolande   Does the patient know what this is regarding?:returning missed call   Would the patient rather a call back or a response via [facility]? Call   Best Call Back Number: [phone]  Additional Information:

## 2024-06-24 NOTE — TELEPHONE ENCOUNTER
Patient having back pain. Patient advised he can have back pain addressed with Dr English on appointment 7/3

## 2024-06-28 ENCOUNTER — HOSPITAL ENCOUNTER (OUTPATIENT)
Dept: RADIOLOGY | Facility: HOSPITAL | Age: 79
Discharge: HOME | End: 2024-06-28
Attending: NEUROLOGICAL SURGERY
Payer: MEDICARE

## 2024-06-28 ENCOUNTER — TELEPHONE (OUTPATIENT)
Dept: NEUROSURGERY | Facility: CLINIC | Age: 79
End: 2024-06-28
Payer: MEDICARE

## 2024-06-28 DIAGNOSIS — G93.9 BRAIN LESION: ICD-10-CM

## 2024-06-28 PROCEDURE — 25500020 PHARM REV CODE 255: Mod: PO | Performed by: NEUROLOGICAL SURGERY

## 2024-06-28 PROCEDURE — 70553 MRI BRAIN STEM W/O & W/DYE: CPT | Mod: 26,,, | Performed by: RADIOLOGY

## 2024-06-28 PROCEDURE — 70553 MRI BRAIN STEM W/O & W/DYE: CPT | Mod: TC,PO

## 2024-06-28 PROCEDURE — A9585 GADOBUTROL INJECTION: HCPCS | Mod: PO | Performed by: NEUROLOGICAL SURGERY

## 2024-06-28 RX ORDER — GADOBUTROL 604.72 MG/ML
7 INJECTION INTRAVENOUS
Status: COMPLETED | OUTPATIENT
Start: 2024-06-28 | End: 2024-06-28

## 2024-06-28 RX ADMIN — GADOBUTROL 7 ML: 604.72 INJECTION INTRAVENOUS at 02:06

## 2024-07-24 ENCOUNTER — LAB VISIT (OUTPATIENT)
Dept: LAB | Facility: HOSPITAL | Age: 79
End: 2024-07-24
Attending: PSYCHIATRY & NEUROLOGY
Payer: MEDICARE

## 2024-07-24 DIAGNOSIS — C71.9 GLIOBLASTOMA MULTIFORME OF BRAIN: ICD-10-CM

## 2024-07-24 LAB
ALBUMIN SERPL BCP-MCNC: 3.7 G/DL (ref 3.5–5.2)
ALP SERPL-CCNC: 31 U/L (ref 55–135)
ALT SERPL W/O P-5'-P-CCNC: <5 U/L (ref 10–44)
ANION GAP SERPL CALC-SCNC: 7 MMOL/L (ref 8–16)
AST SERPL-CCNC: 22 U/L (ref 10–40)
BASOPHILS # BLD AUTO: 0.01 K/UL (ref 0–0.2)
BASOPHILS NFR BLD: 0.2 % (ref 0–1.9)
BILIRUB SERPL-MCNC: 0.5 MG/DL (ref 0.1–1)
BUN SERPL-MCNC: 22 MG/DL (ref 8–23)
CALCIUM SERPL-MCNC: 9.9 MG/DL (ref 8.7–10.5)
CHLORIDE SERPL-SCNC: 102 MMOL/L (ref 95–110)
CO2 SERPL-SCNC: 30 MMOL/L (ref 23–29)
CREAT SERPL-MCNC: 0.8 MG/DL (ref 0.5–1.4)
DIFFERENTIAL METHOD BLD: ABNORMAL
EOSINOPHIL # BLD AUTO: 0.1 K/UL (ref 0–0.5)
EOSINOPHIL NFR BLD: 2.5 % (ref 0–8)
ERYTHROCYTE [DISTWIDTH] IN BLOOD BY AUTOMATED COUNT: 16.3 % (ref 11.5–14.5)
EST. GFR  (NO RACE VARIABLE): >60 ML/MIN/1.73 M^2
GLUCOSE SERPL-MCNC: 86 MG/DL (ref 70–110)
HCT VFR BLD AUTO: 36.9 % (ref 40–54)
HGB BLD-MCNC: 12.2 G/DL (ref 14–18)
IMM GRANULOCYTES # BLD AUTO: 0.03 K/UL (ref 0–0.04)
IMM GRANULOCYTES NFR BLD AUTO: 0.6 % (ref 0–0.5)
LYMPHOCYTES # BLD AUTO: 1.8 K/UL (ref 1–4.8)
LYMPHOCYTES NFR BLD: 35.9 % (ref 18–48)
MCH RBC QN AUTO: 33.5 PG (ref 27–31)
MCHC RBC AUTO-ENTMCNC: 33.1 G/DL (ref 32–36)
MCV RBC AUTO: 101 FL (ref 82–98)
MONOCYTES # BLD AUTO: 0.8 K/UL (ref 0.3–1)
MONOCYTES NFR BLD: 15.4 % (ref 4–15)
NEUTROPHILS # BLD AUTO: 2.3 K/UL (ref 1.8–7.7)
NEUTROPHILS NFR BLD: 45.4 % (ref 38–73)
NRBC BLD-RTO: 0 /100 WBC
PLATELET # BLD AUTO: 97 K/UL (ref 150–450)
PMV BLD AUTO: 10.5 FL (ref 9.2–12.9)
POTASSIUM SERPL-SCNC: 4 MMOL/L (ref 3.5–5.1)
PROT SERPL-MCNC: 6.2 G/DL (ref 6–8.4)
RBC # BLD AUTO: 3.64 M/UL (ref 4.6–6.2)
SODIUM SERPL-SCNC: 139 MMOL/L (ref 136–145)
WBC # BLD AUTO: 5.12 K/UL (ref 3.9–12.7)

## 2024-07-24 PROCEDURE — 85025 COMPLETE CBC W/AUTO DIFF WBC: CPT | Performed by: PSYCHIATRY & NEUROLOGY

## 2024-07-24 PROCEDURE — 80053 COMPREHEN METABOLIC PANEL: CPT | Performed by: PSYCHIATRY & NEUROLOGY

## 2024-07-24 PROCEDURE — 36415 COLL VENOUS BLD VENIPUNCTURE: CPT | Mod: PO | Performed by: PSYCHIATRY & NEUROLOGY

## 2024-07-25 ENCOUNTER — TELEPHONE (OUTPATIENT)
Dept: NEUROLOGY | Facility: CLINIC | Age: 79
End: 2024-07-25

## 2024-07-25 ENCOUNTER — OFFICE VISIT (OUTPATIENT)
Dept: NEUROLOGY | Facility: CLINIC | Age: 79
End: 2024-07-25
Payer: MEDICARE

## 2024-07-25 DIAGNOSIS — C71.9 GLIOBLASTOMA MULTIFORME OF BRAIN: Primary | ICD-10-CM

## 2024-07-25 DIAGNOSIS — G20.C PARKINSONISM, UNSPECIFIED PARKINSONISM TYPE: ICD-10-CM

## 2024-07-25 DIAGNOSIS — R25.1 TREMOR: ICD-10-CM

## 2024-07-25 DIAGNOSIS — R56.9 SEIZURE: ICD-10-CM

## 2024-07-25 PROCEDURE — G2211 COMPLEX E/M VISIT ADD ON: HCPCS | Mod: 95,,, | Performed by: PSYCHIATRY & NEUROLOGY

## 2024-07-25 PROCEDURE — 99215 OFFICE O/P EST HI 40 MIN: CPT | Mod: 95,,, | Performed by: PSYCHIATRY & NEUROLOGY

## 2024-07-25 NOTE — Clinical Note
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 Could you reach out to him to see if we have any financial assistance or anything else that could help him? He reports he's getting social security. Thanks!

## 2024-07-25 NOTE — PROGRESS NOTES
Subjective:       Patient ID: Chacorta Gómez is a 78 y.o. male.    Chief Complaint: glioblastoma    HPI  Oncologic History:   2/2023: admitted to outside hospital for new onset seizure, he was started on keppra. MRI brain was done and was reportedly suspicious for subacute infarct.   4-5/2023: was in [place] for a work trip, had recurrent seizures, was hospitalized there for 10 days.   6/2023: hospitalized for breakthrough seizures, lacosamide was added, underwent repeat MRI brain which showed 4.2 x 2.6 x 4.1 cm heterogeneous enhancing hemorrhagic medial right frontal lesion. A 2nd lesion along the planum sphenoidale has a dural base but appears to also involve the inferior frontal lobe parenchyma. Patient was seen by neurosurgery and underwent subtotal resection. Pathology consistent with GBM.   8/8-28/2023: chemoradiation to 40Gy over 15fx (Alexa Lisa)  10/2023: C1 TMZ @150mg/m2  12/2023: C2 TMZ @200mg/m2  2/7-11/2024: C3 TMZ @200mg/m2  3/28-4/1/2024: C4 TMZ (reportedly did not get enough of one of the pill strengths from the pharmacy, so took his full dose of 390mg days 1-2 and only 140mg days 3-5)  5/15-19/2024: C5 TMZ @200mg/m2  6/2024: C6 TMZ @200mg/m2    Chacorta is feeling ok. He tolerated the most recent cycle of TMZ well. His tremor is still bothersome; he is taking Sinemet 25-100mg 2 tabs in the AM and 1 in the PM. He has difficulty taking the medication 3x daily.    Past Medical History:   Diagnosis Date    Benign localized hyperplasia of prostate with urinary obstruction and other lower urinary tract symptoms (LUTS)(600.21)     Brain lesion     CVA (cerebral vascular accident)     [facility]    Essential hypertension 07/12/2016    Glaucoma     bilat    Multinodular goiter     Seizures     Spontaneous ecchymoses     Unspecified essential hypertension       Current Outpatient Medications   Medication Sig Dispense Refill    carbidopa-levodopa  mg (SINEMET)  mg per tablet Take 1 tablet  by mouth 3 (three) times daily. 270 tablet 3    chlorpheniramine (CHLOR-TRIMETON) 4 mg tablet Take 4 mg by mouth daily as needed (sneezing attacks).      divalproex (DEPAKOTE) 500 MG TbEC Take 2 tablets (1,000 mg total) by mouth every 12 (twelve) hours. 120 tablet 2    dorzolamide-timolol 2-0.5% (COSOPT) 22.3-6.8 mg/mL ophthalmic solution Place 1 drop into both eyes 2 (two) times daily.      famotidine (PEPCID) 20 MG tablet Take 20 mg by mouth daily as needed for Heartburn.      folic acid (FOLVITE) 1 MG tablet Take 1 tablet (1 mg total) by mouth once daily. 90 tablet 3    furosemide (LASIX) 20 MG tablet Take 1 tablet (20 mg total) by mouth daily as needed (swelling). 90 tablet 1    hydroCHLOROthiazide (HYDRODIURIL) 12.5 MG Tab Take 1 tablet (12.5 mg total) by mouth once daily. 90 tablet 3    HYDROcodone-acetaminophen (NORCO) 5-325 mg per tablet Take 1 tablet by mouth every 6 (six) hours as needed for Pain. 120 tablet 0    latanoprost 0.005 % ophthalmic solution Place 1 drop into both eyes every evening.      loratadine (CLARITIN) 10 mg tablet Take 10 mg by mouth once daily.      multivitamin Tab Take 1 tablet by mouth once daily. 30 tablet 0    naproxen sodium (ALEVE) 220 mg Cap Take by mouth.      omega 3-dha-epa-fish oil (FISH OIL) 1,000 mg (120 mg-180 mg) Cap Take 1 capsule by mouth Daily.      ondansetron (ZOFRAN) 8 MG tablet Take 8mg (1 tab) 30-45 minutes prior to chemotherapy dose and every 8 hours as needed 30 tablet 3    thiamine 100 MG tablet Take 100 mg by mouth once daily.       No current facility-administered medications for this visit.      Past Surgical History:   Procedure Laterality Date    COLONOSCOPY  08/03/2009    Dr. Askew    CRANIOTOMY FOR EXCISION OF INTRACRANIAL TUMOR      CRANIOTOMY, WITH NEOPLASM EXCISION USING COMPUTER-ASSISTED NAVIGATION Right 6/13/2023    Procedure: CRANIOTOMY, WITH NEOPLASM EXCISION USING COMPUTER-ASSISTED NAVIGATION -     RIGHT FRONTAL;  Surgeon: Joao English,  MD;  Location: [facility];  Service: Neurosurgery;  Laterality: Right;    NASAL SEPTUM SURGERY      TONSILLECTOMY        Family History   Problem Relation Name Age of Onset    Heart attack Mother      Hypertension Mother      Cancer Father          prostate     Hypertension Brother      Stroke Brother          mild       Social History     Tobacco Use    Smoking status: Never     Passive exposure: Never    Smokeless tobacco: Never   Substance Use Topics    Alcohol use: Not Currently     Alcohol/week: 14.0 standard drinks of alcohol     Types: 7 Glasses of wine, 7 Cans of beer per week    Drug use: Never      Review of Systems  KPS: 80        Objective:      There were no vitals filed for this visit.  Telehealth visit    (Prior)  NEUROLOGICAL EXAMINATION:     MENTAL STATUS   Attention: normal. Concentration: normal.   Speech: speech is normal   Level of consciousness: alert    CRANIAL NERVES   Cranial nerves II through XII intact.     MOTOR EXAM   Muscle bulk: normal  Right arm tone: increased    Strength   Strength 5/5 throughout.        Rigidity and bradykinesia R>L  Medium-frequency, low to medium-amplitude tremor of the right hand>left, more at rest than with posture or action     SENSORY EXAM   Light touch normal.     GAIT AND COORDINATION      Coordination   Finger to nose coordination: normal       Stooped posture with mild shuffling. En bloc, multistep turn.        MRI brain from 6/28/2024 was personally visualized and compared to prior.  Per my read, this is an essentially stable study with no definitive evidence of tumor progression.    Assessment:       Problem List Items Addressed This Visit          Oncology    Glioblastoma multiforme of brain - Primary     Other Visit Diagnoses       Parkinsonism, unspecified Parkinsonism type        Seizure        Tremor                        Plan:       Chacorta Gómez is a 78 y.o. male with a glioblastoma, MGMT-methylated, IDH-wt, WHO grade 4 s/p subtotal resection  and chemoradiation to 40Gy over 15fx and adjuvant TMZ x6 cycles completed 6/2024 presenting for treatment recommendations. Tumor diagnosis was heralded by seizure.     Glioblastoma  Most recent MRI brain is stable. End of chemo labs are unremarkable. F/u in 4 weeks with surveillance MRI brain.    Tremor/parkinsonism  Pill rolling tremor, bradykinesia, and rigidity R>L along with stooped posture and shuffling consistent with parkinsonism. It is odd that he and wife feel this started suddenly after his surgery as he is at least a H&Y stage 2. Symptoms mildly improved on Sinemet 25-100mg 1.5 tab tid. Advised taking the medication at 8-9AM (upon awakening), noon, and 4pm, rather than morning/mid-day/bedtime to maximize coverage during the day. He has trouble taking his mid-day dose. We discussed that the medication is short-acting. He denies significant breakthrough symptoms. Will continue to tweak medicine as needed.    He would like to try an OTC supplement called Tremodone. Based on the ingredients I can see, this should be safe to try.    Seizures  Depakote 1000mg bid. Stopped Vimpat inadvertently; unclear when, but has not had additional seizures. CTM.          The patient location is: home in [place]  The chief complaint leading to consultation is: glioblastoma    Visit type: audiovisual    Face to Face time with patient: 10 min    Visit today included increased complexity associated with the care of the episodic problem seizures, glioblastoma, tremor/parkinsonism addressed and managing the longitudinal care of the patient due to the serious and/or complex managed problem(s) glioblastoma/seizures/tremor/parkinsonism.     Lucita Macias MD  Department of Neurology  Neuro-Oncology, Movement Disorders

## 2024-07-25 NOTE — TELEPHONE ENCOUNTER
SW called patient to provide information for [facility]'s Financial Assistance program. However, SW was sent to  and left message with callback number.

## 2024-07-30 ENCOUNTER — DOCUMENTATION ONLY (OUTPATIENT)
Dept: HEMATOLOGY/ONCOLOGY | Facility: CLINIC | Age: 79
End: 2024-07-30
Payer: MEDICARE

## 2024-07-30 NOTE — PROGRESS NOTES
Received referral from the clinic that the patient is having difficulty with medical bills. Attempted to reach to discuss resources. There was no answer so I left a detailed message and my contact information.

## 2024-08-07 DIAGNOSIS — G40.909 SEIZURE DISORDER: ICD-10-CM

## 2024-08-08 RX ORDER — DIVALPROEX SODIUM 500 MG/1
1000 TABLET, DELAYED RELEASE ORAL EVERY 12 HOURS
Qty: 360 TABLET | Refills: 3 | Status: SHIPPED | OUTPATIENT
Start: 2024-08-08

## 2024-08-28 ENCOUNTER — HOSPITAL ENCOUNTER (OUTPATIENT)
Dept: RADIOLOGY | Facility: HOSPITAL | Age: 79
Discharge: HOME | End: 2024-08-28
Attending: PSYCHIATRY & NEUROLOGY
Payer: MEDICARE

## 2024-08-28 DIAGNOSIS — C71.9 GLIOBLASTOMA MULTIFORME OF BRAIN: ICD-10-CM

## 2024-08-28 PROCEDURE — 70553 MRI BRAIN STEM W/O & W/DYE: CPT | Mod: 26,,, | Performed by: RADIOLOGY

## 2024-08-28 PROCEDURE — A9585 GADOBUTROL INJECTION: HCPCS | Mod: PO | Performed by: PSYCHIATRY & NEUROLOGY

## 2024-08-28 PROCEDURE — 25500020 PHARM REV CODE 255: Mod: PO | Performed by: PSYCHIATRY & NEUROLOGY

## 2024-08-28 PROCEDURE — 70553 MRI BRAIN STEM W/O & W/DYE: CPT | Mod: TC,PO

## 2024-08-28 RX ORDER — GADOBUTROL 604.72 MG/ML
6 INJECTION INTRAVENOUS
Status: COMPLETED | OUTPATIENT
Start: 2024-08-28 | End: 2024-08-28

## 2024-08-28 RX ADMIN — GADOBUTROL 6 ML: 604.72 INJECTION INTRAVENOUS at 02:08

## 2024-08-29 ENCOUNTER — TELEPHONE (OUTPATIENT)
Dept: NEUROLOGY | Facility: CLINIC | Age: 79
End: 2024-08-29
Payer: MEDICARE

## 2024-09-10 ENCOUNTER — TELEPHONE (OUTPATIENT)
Dept: NEUROLOGY | Facility: CLINIC | Age: 79
End: 2024-09-10
Payer: MEDICARE

## 2024-09-18 ENCOUNTER — OFFICE VISIT (OUTPATIENT)
Dept: NEUROLOGY | Facility: CLINIC | Age: 79
End: 2024-09-18
Payer: MEDICARE

## 2024-09-18 DIAGNOSIS — D69.6 THROMBOCYTOPENIA: ICD-10-CM

## 2024-09-18 DIAGNOSIS — R25.1 TREMOR: ICD-10-CM

## 2024-09-18 DIAGNOSIS — G20.C PARKINSONISM, UNSPECIFIED PARKINSONISM TYPE: ICD-10-CM

## 2024-09-18 DIAGNOSIS — C71.9 GLIOBLASTOMA MULTIFORME OF BRAIN: Primary | ICD-10-CM

## 2024-09-18 DIAGNOSIS — G40.909 SEIZURE DISORDER: ICD-10-CM

## 2024-09-18 PROCEDURE — 99214 OFFICE O/P EST MOD 30 MIN: CPT | Mod: 95,,, | Performed by: PSYCHIATRY & NEUROLOGY

## 2024-09-18 PROCEDURE — G2211 COMPLEX E/M VISIT ADD ON: HCPCS | Mod: 95,,, | Performed by: PSYCHIATRY & NEUROLOGY

## 2024-09-18 RX ORDER — DIVALPROEX SODIUM 500 MG/1
1000 TABLET, DELAYED RELEASE ORAL EVERY 12 HOURS
Qty: 360 TABLET | Refills: 3 | Status: SHIPPED | OUTPATIENT
Start: 2024-09-18

## 2024-09-18 NOTE — PROGRESS NOTES
Subjective:       Patient ID: Chacorta Gómez is a 79 y.o. male.    Chief Complaint: glioblastoma    HPI  Oncologic History:   2/2023: admitted to outside hospital for new onset seizure, he was started on keppra. MRI brain was done and was reportedly suspicious for subacute infarct.   4-5/2023: was in [place] for a work trip, had recurrent seizures, was hospitalized there for 10 days.   6/2023: hospitalized for breakthrough seizures, lacosamide was added, underwent repeat MRI brain which showed 4.2 x 2.6 x 4.1 cm heterogeneous enhancing hemorrhagic medial right frontal lesion. A 2nd lesion along the planum sphenoidale has a dural base but appears to also involve the inferior frontal lobe parenchyma. Patient was seen by neurosurgery and underwent subtotal resection. Pathology consistent with GBM.   8/8-28/2023: chemoradiation to 40Gy over 15fx (Alexa Lisa)  10/2023: C1 TMZ @150mg/m2  12/2023: C2 TMZ @200mg/m2  2/7-11/2024: C3 TMZ @200mg/m2  3/28-4/1/2024: C4 TMZ (reportedly did not get enough of one of the pill strengths from the pharmacy, so took his full dose of 390mg days 1-2 and only 140mg days 3-5)  5/15-19/2024: C5 TMZ @200mg/m2  6/2024: C6 TMZ @200mg/m2    Chacorta is feeling well. His tremor is somewhat improved, and he has been making some art. No new neurologic symptoms.    Past Medical History:   Diagnosis Date    Benign localized hyperplasia of prostate with urinary obstruction and other lower urinary tract symptoms (LUTS)(600.21)     Brain lesion     CVA (cerebral vascular accident)     [facility]    Essential hypertension 07/12/2016    Glaucoma     bilat    Multinodular goiter     Seizures     Spontaneous ecchymoses     Unspecified essential hypertension       Current Outpatient Medications   Medication Sig Dispense Refill    carbidopa-levodopa  mg (SINEMET)  mg per tablet Take 1 tablet by mouth 3 (three) times daily. 270 tablet 3    chlorpheniramine (CHLOR-TRIMETON) 4 mg tablet Take  4 mg by mouth daily as needed (sneezing attacks).      divalproex (DEPAKOTE) 500 MG TbEC Take 2 tablets (1,000 mg total) by mouth every 12 (twelve) hours. 360 tablet 3    dorzolamide-timolol 2-0.5% (COSOPT) 22.3-6.8 mg/mL ophthalmic solution Place 1 drop into both eyes 2 (two) times daily.      famotidine (PEPCID) 20 MG tablet Take 20 mg by mouth daily as needed for Heartburn.      folic acid (FOLVITE) 1 MG tablet Take 1 tablet (1 mg total) by mouth once daily. 90 tablet 3    furosemide (LASIX) 20 MG tablet Take 1 tablet (20 mg total) by mouth daily as needed (swelling). 90 tablet 1    hydroCHLOROthiazide (HYDRODIURIL) 12.5 MG Tab Take 1 tablet (12.5 mg total) by mouth once daily. 90 tablet 3    HYDROcodone-acetaminophen (NORCO) 5-325 mg per tablet Take 1 tablet by mouth every 6 (six) hours as needed for Pain. 120 tablet 0    latanoprost 0.005 % ophthalmic solution Place 1 drop into both eyes every evening.      loratadine (CLARITIN) 10 mg tablet Take 10 mg by mouth once daily.      multivitamin Tab Take 1 tablet by mouth once daily. 30 tablet 0    naproxen sodium (ALEVE) 220 mg Cap Take by mouth.      omega 3-dha-epa-fish oil (FISH OIL) 1,000 mg (120 mg-180 mg) Cap Take 1 capsule by mouth Daily.      ondansetron (ZOFRAN) 8 MG tablet Take 8mg (1 tab) 30-45 minutes prior to chemotherapy dose and every 8 hours as needed 30 tablet 3    thiamine 100 MG tablet Take 100 mg by mouth once daily.       No current facility-administered medications for this visit.      Past Surgical History:   Procedure Laterality Date    COLONOSCOPY  08/03/2009    Dr. Askew    CRANIOTOMY FOR EXCISION OF INTRACRANIAL TUMOR      CRANIOTOMY, WITH NEOPLASM EXCISION USING COMPUTER-ASSISTED NAVIGATION Right 6/13/2023    Procedure: CRANIOTOMY, WITH NEOPLASM EXCISION USING COMPUTER-ASSISTED NAVIGATION -     RIGHT FRONTAL;  Surgeon: Joao English MD;  Location: [facility];  Service: Neurosurgery;  Laterality: Right;    NASAL SEPTUM SURGERY       TONSILLECTOMY        Family History   Problem Relation Name Age of Onset    Heart attack Mother      Hypertension Mother      Cancer Father          prostate     Hypertension Brother      Stroke Brother          mild       Social History     Tobacco Use    Smoking status: Never     Passive exposure: Never    Smokeless tobacco: Never   Substance Use Topics    Alcohol use: Not Currently     Alcohol/week: 14.0 standard drinks of alcohol     Types: 7 Glasses of wine, 7 Cans of beer per week    Drug use: Never      Review of Systems  KPS: 80        Objective:      There were no vitals filed for this visit.  Telehealth visit    (Prior)  NEUROLOGICAL EXAMINATION:     MENTAL STATUS   Attention: normal. Concentration: normal.   Speech: speech is normal   Level of consciousness: alert    CRANIAL NERVES   Cranial nerves II through XII intact.     MOTOR EXAM   Muscle bulk: normal  Right arm tone: increased    Strength   Strength 5/5 throughout.        Rigidity and bradykinesia R>L  Medium-frequency, low to medium-amplitude tremor of the right hand>left, more at rest than with posture or action     SENSORY EXAM   Light touch normal.     GAIT AND COORDINATION      Coordination   Finger to nose coordination: normal       Stooped posture with mild shuffling. En bloc, multistep turn.        MRI brain from 8/28/2024 was personally visualized and compared to prior.  Per my read, this is an essentially stable study with no definitive evidence of tumor progression.    Assessment:       Problem List Items Addressed This Visit          Neuro    Seizure disorder    Relevant Medications    divalproex (DEPAKOTE) 500 MG TbEC       Hematology    Thrombocytopenia    Relevant Orders    CBC W/ AUTO DIFFERENTIAL       Oncology    Glioblastoma multiforme of brain - Primary    Relevant Orders    CBC W/ AUTO DIFFERENTIAL     Other Visit Diagnoses       Tremor        Parkinsonism, unspecified Parkinsonism type                    Plan:       Chacorta MELENDEZ  Dalia is a 79 y.o. male with a glioblastoma, MGMT-methylated, IDH-wt, WHO grade 4 s/p subtotal resection and chemoradiation to 40Gy over 15fx and adjuvant TMZ x6 cycles completed 6/2024 presenting for treatment recommendations. Tumor diagnosis was heralded by seizure.     Glioblastoma  Most recent MRI brain is stable.F/u in 2 months with surveillance MRI brain.    Tremor/parkinsonism  Pill rolling tremor, bradykinesia, and rigidity R>L along with stooped posture and shuffling consistent with parkinsonism. It is odd that he and wife feel this started suddenly after his surgery as he is at least a H&Y stage 2. Symptoms mildly improved on Sinemet 25-100mg 1.5 tab tid. HE is also taking a supplement called Tremodone. He feels his tremor is somewhat improved with this treatment.    Seizures  Continue Depakote 1000mg bid    Thrombocytopenia  Related to temozolomide; he has been off of this since July. Will recheck CBC given upcoming need for dental work.          The patient location is: home in [place]  The chief complaint leading to consultation is: glioblastoma    Visit type: audiovisual    Face to Face time with patient: 9 min    Visit today included increased complexity associated with the care of the episodic problem seizures, glioblastoma, tremor/parkinsonism addressed and managing the longitudinal care of the patient due to the serious and/or complex managed problem(s) glioblastoma/seizures/tremor/parkinsonism.     Lucita Macias MD  Department of Neurology  Neuro-Oncology, Movement Disorders

## 2024-09-26 ENCOUNTER — OFFICE VISIT (OUTPATIENT)
Dept: PALLIATIVE MEDICINE | Facility: CLINIC | Age: 79
End: 2024-09-26
Payer: MEDICARE

## 2024-09-26 DIAGNOSIS — C71.9 GLIOBLASTOMA DETERMINED BY BIOPSY OF BRAIN: ICD-10-CM

## 2024-09-26 PROCEDURE — 1101F PT FALLS ASSESS-DOCD LE1/YR: CPT | Mod: CPTII,95,, | Performed by: STUDENT IN AN ORGANIZED HEALTH CARE EDUCATION/TRAINING PROGRAM

## 2024-09-26 PROCEDURE — 1159F MED LIST DOCD IN RCRD: CPT | Mod: CPTII,95,, | Performed by: STUDENT IN AN ORGANIZED HEALTH CARE EDUCATION/TRAINING PROGRAM

## 2024-09-26 PROCEDURE — 3288F FALL RISK ASSESSMENT DOCD: CPT | Mod: CPTII,95,, | Performed by: STUDENT IN AN ORGANIZED HEALTH CARE EDUCATION/TRAINING PROGRAM

## 2024-09-26 PROCEDURE — 99215 OFFICE O/P EST HI 40 MIN: CPT | Mod: 95,,, | Performed by: STUDENT IN AN ORGANIZED HEALTH CARE EDUCATION/TRAINING PROGRAM

## 2024-09-26 RX ORDER — HYDROCODONE BITARTRATE AND ACETAMINOPHEN 5; 325 MG/1; MG/1
1 TABLET ORAL EVERY 6 HOURS PRN
Qty: 120 TABLET | Refills: 0 | Status: SHIPPED | OUTPATIENT
Start: 2024-09-26

## 2024-09-26 NOTE — PROGRESS NOTES
Palliative Medicine Clinic Note        Consult Requested By: No ref. provider found      Reason for Consult/Chief Complaint: Glibolastoma multiforme, worsening back pain      The patient location is: [place], [place]  The chief complaint leading to consultation is: Fatigue    Visit type: audiovisual    Face to Face time with patient: 11  40 minutes of total time spent on the encounter, which includes face to face time and non-face to face time preparing to see the patient (eg, review of tests), Obtaining and/or reviewing separately obtained history, Documenting clinical information in the electronic or other health record, Independently interpreting results (not separately reported) and communicating results to the patient/family/caregiver, or Care coordination (not separately reported).       Each patient provided with medical services by telemedicine is:  (1) informed of the relationship between the physician and patient and the respective role of any other health care provider with respect to management of the patient; and (2) notified that he or she may decline to receive medical services by telemedicine and may withdraw from such care at any time.           ASSESSMENT/PLAN:      Plan/Recommendations:  Chacorta was seen today for follow-up.    Diagnoses and all orders for this visit:    Glioblastoma determined by biopsy of brain  -     HYDROcodone-acetaminophen (NORCO) 5-325 mg per tablet; Take 1 tablet by mouth every 6 (six) hours as needed for Pain.            Glioblastoma Multiforme  - S/p subtotal resection with NSGY and adjuvant radiation therapy in 2023  - Follows with Dr. Macias, on tezolomide  - Repeat MRI q2mo  - Discussed that cancer likely to recur or worsen at some point, though we hope his current period of relatively minimal symptoms continues for quite some time    Assessment & Plan    BRAIN TUMOR:  - Reviewed recent MRI brain showing stable disease with no definitive evidence of tumor  progression.    TREMORS:  - Assessed patient's tremors, noting improvement possibly due to Parkinson's medication or homeopathic supplement.  - Patient to consider pausing homeopathic tremor supplement to assess its effectiveness.    KIDNEY FUNCTION:  - Evaluated kidney function in context of NSAID use, finding it currently acceptable.  - Discussed the potential risks associated with long-term NSAID use, particularly regarding kidney function.    THROMBOCYTOPENIA:  - Considered platelet levels, acknowledging they are low but not critically so for spontaneous bleeding.  - Patient to avoid aspirin due to increased bleeding risk compared to Aleve.    PAIN MANAGEMENT:  - Discussed pain management strategy, deciding to maintain current regimen with Norco and Aleve.  - Explained the difference between NSAIDs (like Aleve) and aspirin in terms of platelet inhibition and bleeding risk.  - Clarified that Norco contains Tylenol (acetaminophen), cautioning against unintentional overdose when combining with other acetaminophen-containing products.  - Refilled Norco 5 mg tablets.  - Continued Aleve as needed for mild headaches and pain.    FOLLOW UP:  - Follow up virtually in December.  - Follow up in-person in March to comply with [facility] requirements for pain medication prescribing.  - Contact the office if need to reschedule or if any issues arise before next appointment.              Advance Care Planning   Advance Directives:   Agent's Name:  Esperanza Gómez   Agent's Contact Number:  [phone]    Decision Making:  Patient answered questions  Goals of Care: The patient endorses that what is most important right now is to focus on remaining as independent as possible and symptom/pain control    Accordingly, we have decided that the best plan to meet the patient's goals includes continuing with treatment                   Follow up: 2 months     Plan discussed with: RAFA             SUBJECTIVE:          History of Present  "Illness / Interval History:  Chacorta Gómez is 79 y.o. year old male presenting with Glioblastoma Multiforme s/p subtotal resection with NSGY and adjuvant radiation therapy.  Referred to Palliative Care for physical symptoms, advance care planning,, and additional support..    9/26/24 History of Present Illness    CHIEF COMPLAINT:  - The patient presents virtually today for a follow-up of glioblastoma.    HISTORY OBTAINED FROM:  - Patient    HPI:  The patient is a 79-year-old gentleman with a history of glioblastoma, status post subtotal resection and adjuvant radiation therapy in 2023. He was last seen by the current provider in June 2024 and by Dr. Boswell in neuro-oncology on September 18, 2024. Dr. Boswell noted a stable MRI brain on his most recent scans with no definitive evidence of tumor progression. He is "doing pretty good" and is still getting around, albeit slowly. His tremors have improved somewhat, which he attributes to either his Parkinson's medication or a homeopathic supplement he started taking a few months ago. He experiences mild headaches, which he manages with occasional use of Aleve. The patient reports only taking his prescribed Norco (5 mg) occasionally, preferring to use Aleve more frequently. 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 The patient mentions having low platelet counts, which has complicated a planned tooth extraction.    The patient denies experiencing severe headaches or significant pain requiring regular use of opioid medication.    GOALS OF CARE/ADVANCED DIRECTIVES:  - Desires to continue artistic pursuits  - Has been a full-time artist for 50 years  - Trying to get back into painting as condition allows  - Maintaining ability to create art is a significant quality of life factor  - Lives with wife, who is experiencing her own health issues  - Demonstrates understanding of his condition  - " Acknowledges ongoing nature of illness    ACTIVITIES OF DAILY LIVING:  - Getting around slowly but independently  - Experiencing tremors, which have improved somewhat  - Able to engage in painting activities, though with some difficulty due to tremors  - Able to take medications independently    MEDICATIONS:  - Norco, 5 mg tablets, taken occasionally, oral, for pain  - Aleve, taken for mild headaches and pain  - Parkinson's medication  - Homeopathic supplement, for tremors, taken for a few months    MEDICAL HISTORY:  - Glioblastoma, diagnosed in 2023  - Parkinson's disease, ongoing    SURGICAL HISTORY:  - Subtotal resection: Performed in 2023 for glioblastoma  - Adjuvant radiation therapy: Performed in 2023 for glioblastoma    SOCIAL HISTORY:  -   -  at [employer] (part-time)  - Full-time artist for 50 years, main income from selling paintings          04/18/2024: The patient with Parkinson's disease and lymphedema has been attending rehab almost daily for the past month to improve leg strength and balance. The patient reports feeling uneasy while walking but acknowledges some improvement in the condition due to rehab. The rehab also included lymphatic drainage techniques for swollen feet, which have helped reduce the swelling significantly, although ankles remain swollen. . Dr. Boswell recently adjusted the Sinemet (Carbidopa Levodopa) dosage, a medication for Parkinson's, to help the patient feel less stiff and move more. The patient also mentioned restarting chemotherapy medication but had an issue with the number of capsules received. The patient is currently out of chemotherapy medication and is unsure if more should have been received. . The patient also ran out of Divalproex (Depakote) medication, which is used to prevent seizures. The patient reports feeling fatigued, which interferes with activities to some extent. The patient gets short of breath after a lot of walking and needs to  take a nap afterward. Despite this, the patient was able to go shopping at [employer], pushing a basket around the store. . The patient also reports a persistent tremor in the hands, which has not improved and affects the ability to paint, an activity previously enjoyed. 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 The patient also mentioned depending on the spouse for many tasks that were previously done independently.       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 He was hospitalized in [place] for 10 days. After returning to the [place], an MRI revealed a brain tumor, which was diagnosed as glioblastoma. His main concerns are the tremors in his hands, which make it difficult for him to paint and write, and worsening lower back pain. He has tried Parkinson's medication with minimal improvement in the tremors. He has a history of degenerative spine issues in the lower back. He is currently undergoing rehab and has recently started treatment for lymphedema. He denies any chest pain, shortness of breath, or left leg numbness. He denies any history of diabetes, hypertension, or prostate cancer.    GOALS OF CARE/ADVANCED DIRECTIVES:  - He is primarily concerned about his hand tremors and back pain, which are currently inhibiting his ability to paint, a significant source of celeste and purpose in his life.  - His wife is his primary caregiver and the person he would want to make medical decisions for him if he were unable to do so himself.  - He has other relatives who could potentially provide support if needed, but they live in [place], which is a distance away from his home.  - He expressed  concern about needing home care in the future, particularly in relation to managing his medications.    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                 ROS:  Review of Systems    Review of Symptoms      Symptom Assessment (ESAS 0-10 Scale)  Pain:  0  Dyspnea:  0  Anxiety:  0  Nausea:  0  Depression:  0  Anorexia:  0  Fatigue:  3  Insomnia:  0  Restlessness:  0  Agitation:  0     Constipation:  Negative  Diarrhea:  Negative      Pain Assessment:    Location(s): back    Back       Location: lower and bilateral        Quality: Dull and sharp        Quantity: 6/10 in intensity        Chronicity: Onset 2 year(s) ago, gradually worsening        Aggravating Factors: Movement        Alleviating Factors: Acetaminophen and NSAIDs       Associated Symptoms: None    Performance Status:  70    Living Arrangements:  Lives with spouse and Lives in home    Psychosocial/Cultural:   See Palliative Psychosocial Note: Yes  -  and lives at home with his wife Esperanza - An artist who paints and sells his work; previously [employer] - Eager to get back to painting - Income has been affected due to not working now due to his health condition     **Primary  to Follow**  Palliative Care  Consult: Yes        External  database queried on 09/26/2024  by Shon Jett     Medications:    Current Outpatient Medications:     carbidopa-levodopa  mg (SINEMET)  mg per tablet, Take 1 tablet by mouth 3 (three) times daily., Disp: 270 tablet, Rfl: 3    chlorpheniramine (CHLOR-TRIMETON) 4 mg tablet, Take 4 mg by mouth daily as needed (sneezing attacks)., Disp: , Rfl:      divalproex (DEPAKOTE) 500 MG TbEC, Take 2 tablets (1,000 mg total) by mouth every 12 (twelve) hours., Disp: 360 tablet, Rfl: 3    dorzolamide-timolol 2-0.5% (COSOPT) 22.3-6.8 mg/mL ophthalmic solution, Place 1 drop into both eyes 2 (two) times daily., Disp: , Rfl:     famotidine (PEPCID) 20 MG tablet, Take 20 mg by mouth daily as needed for Heartburn., Disp: , Rfl:     folic acid (FOLVITE) 1 MG tablet, Take 1 tablet (1 mg total) by mouth once daily., Disp: 90 tablet, Rfl: 3    furosemide (LASIX) 20 MG tablet, Take 1 tablet (20 mg total) by mouth daily as needed (swelling)., Disp: 90 tablet, Rfl: 1    hydroCHLOROthiazide (HYDRODIURIL) 12.5 MG Tab, Take 1 tablet (12.5 mg total) by mouth once daily., Disp: 90 tablet, Rfl: 3    latanoprost 0.005 % ophthalmic solution, Place 1 drop into both eyes every evening., Disp: , Rfl:     loratadine (CLARITIN) 10 mg tablet, Take 10 mg by mouth once daily., Disp: , Rfl:     multivitamin Tab, Take 1 tablet by mouth once daily., Disp: 30 tablet, Rfl: 0    naproxen sodium (ALEVE) 220 mg Cap, Take by mouth., Disp: , Rfl:     omega 3-dha-epa-fish oil (FISH OIL) 1,000 mg (120 mg-180 mg) Cap, Take 1 capsule by mouth Daily., Disp: , Rfl:     ondansetron (ZOFRAN) 8 MG tablet, Take 8mg (1 tab) 30-45 minutes prior to chemotherapy dose and every 8 hours as needed, Disp: 30 tablet, Rfl: 3    thiamine 100 MG tablet, Take 100 mg by mouth once daily., Disp: , Rfl:     HYDROcodone-acetaminophen (NORCO) 5-325 mg per tablet, Take 1 tablet by mouth every 6 (six) hours as needed for Pain., Disp: 120 tablet, Rfl: 0    Review of patient's allergies indicates:  No Known Allergies        OBJECTIVE:         Physical Exam:  Vitals: BP:  (virtual) (09/26/24 1521)  Physical ExamPhysical Exam            Unable to perform full physical exam due to telemedicine visit.  Limited exam:  Gen: NAD; pleasant and engaged conversation; no signs of discomfort  Non diaphoretic  Speech normal  No increased work  of breathing  No cyanosis  Able to walk around living room without difficulty        I spent a total of 40 minutes on the day of the visit. This includes face to face time in discussion of goals of care, symptom assessment, coordination of care and emotional support.  This also includes non-face to face time preparing to see the patient (eg, review of tests/imaging), obtaining and/or reviewing separately obtained history, documenting clinical information in the electronic or other health record, independently interpreting results and communicating results to the patient/family/caregiver, or care coordinator.       Additional 5 min time spent on a voluntary advance care planning and /or goals of care discussion, providing emotional support, formulating and communicating prognosis and exploring burden/benefit of various approaches of treatment.     This note was generated with the assistance of ambient listening technology. Verbal consent was obtained by the patient and accompanying visitor(s) for the recording of patient appointment to facilitate this note. I attest to having reviewed and edited the generated note for accuracy, though some syntax or spelling errors may persist. Please contact the author of this note for any clarification.

## 2024-09-27 ENCOUNTER — PATIENT MESSAGE (OUTPATIENT)
Dept: NEUROLOGY | Facility: CLINIC | Age: 79
End: 2024-09-27
Payer: MEDICARE

## 2024-09-27 DIAGNOSIS — D69.6 THROMBOCYTOPENIA: Primary | ICD-10-CM

## 2024-09-30 ENCOUNTER — LAB VISIT (OUTPATIENT)
Dept: LAB | Facility: HOSPITAL | Age: 79
End: 2024-09-30
Attending: PSYCHIATRY & NEUROLOGY
Payer: MEDICARE

## 2024-09-30 DIAGNOSIS — D69.6 THROMBOCYTOPENIA: ICD-10-CM

## 2024-09-30 PROCEDURE — 36415 COLL VENOUS BLD VENIPUNCTURE: CPT | Mod: PO | Performed by: PSYCHIATRY & NEUROLOGY

## 2024-09-30 PROCEDURE — 85025 COMPLETE CBC W/AUTO DIFF WBC: CPT | Performed by: PSYCHIATRY & NEUROLOGY

## 2024-10-01 LAB
BASOPHILS # BLD AUTO: 0.02 K/UL (ref 0–0.2)
BASOPHILS NFR BLD: 0.4 % (ref 0–1.9)
DIFFERENTIAL METHOD BLD: ABNORMAL
EOSINOPHIL # BLD AUTO: 0.2 K/UL (ref 0–0.5)
EOSINOPHIL NFR BLD: 4.1 % (ref 0–8)
ERYTHROCYTE [DISTWIDTH] IN BLOOD BY AUTOMATED COUNT: 12.6 % (ref 11.5–14.5)
HCT VFR BLD AUTO: 41.5 % (ref 40–54)
HGB BLD-MCNC: 12.9 G/DL (ref 14–18)
IMM GRANULOCYTES # BLD AUTO: 0.02 K/UL (ref 0–0.04)
IMM GRANULOCYTES NFR BLD AUTO: 0.4 % (ref 0–0.5)
LYMPHOCYTES # BLD AUTO: 2 K/UL (ref 1–4.8)
LYMPHOCYTES NFR BLD: 36.6 % (ref 18–48)
MCH RBC QN AUTO: 31.9 PG (ref 27–31)
MCHC RBC AUTO-ENTMCNC: 31.1 G/DL (ref 32–36)
MCV RBC AUTO: 103 FL (ref 82–98)
MONOCYTES # BLD AUTO: 0.5 K/UL (ref 0.3–1)
MONOCYTES NFR BLD: 9.5 % (ref 4–15)
NEUTROPHILS # BLD AUTO: 2.6 K/UL (ref 1.8–7.7)
NEUTROPHILS NFR BLD: 49 % (ref 38–73)
NRBC BLD-RTO: 0 /100 WBC
PLATELET # BLD AUTO: 187 K/UL (ref 150–450)
PMV BLD AUTO: 10.1 FL (ref 9.2–12.9)
RBC # BLD AUTO: 4.05 M/UL (ref 4.6–6.2)
WBC # BLD AUTO: 5.36 K/UL (ref 3.9–12.7)

## 2024-11-12 ENCOUNTER — TELEPHONE (OUTPATIENT)
Dept: PAIN MEDICINE | Facility: CLINIC | Age: 79
End: 2024-11-12
Payer: MEDICARE

## 2024-11-18 ENCOUNTER — TELEPHONE (OUTPATIENT)
Dept: NEUROLOGY | Facility: CLINIC | Age: 79
End: 2024-11-18
Payer: MEDICARE

## 2024-11-18 NOTE — TELEPHONE ENCOUNTER
Called patient to reschedule follow up and MRI appointment with Dr. Macias, appointment scheduled for 12/17/24.

## 2024-11-21 ENCOUNTER — TELEPHONE (OUTPATIENT)
Dept: NEUROLOGY | Facility: CLINIC | Age: 79
End: 2024-11-21
Payer: MEDICARE

## 2024-12-16 ENCOUNTER — TELEPHONE (OUTPATIENT)
Dept: NEUROLOGY | Facility: CLINIC | Age: 79
End: 2024-12-16
Payer: MEDICARE

## 2024-12-16 NOTE — TELEPHONE ENCOUNTER
Patient states that staff from imaging center rescheduled MRI to 2:15 tomorrow and he will be unable to follow up after appointment. Informed patient to still go for his MRI and that I will schedule him virtually the next day.

## 2024-12-17 ENCOUNTER — HOSPITAL ENCOUNTER (OUTPATIENT)
Dept: RADIOLOGY | Facility: HOSPITAL | Age: 79
Discharge: HOME | End: 2024-12-17
Attending: PSYCHIATRY & NEUROLOGY
Payer: MEDICARE

## 2024-12-17 DIAGNOSIS — C71.9 GLIOBLASTOMA MULTIFORME OF BRAIN: ICD-10-CM

## 2024-12-17 PROCEDURE — 70553 MRI BRAIN STEM W/O & W/DYE: CPT | Mod: TC

## 2024-12-17 PROCEDURE — A9585 GADOBUTROL INJECTION: HCPCS | Performed by: PSYCHIATRY & NEUROLOGY

## 2024-12-17 PROCEDURE — 25500020 PHARM REV CODE 255: Performed by: PSYCHIATRY & NEUROLOGY

## 2024-12-17 PROCEDURE — 70553 MRI BRAIN STEM W/O & W/DYE: CPT | Mod: 26,,, | Performed by: RADIOLOGY

## 2024-12-17 RX ORDER — GADOBUTROL 604.72 MG/ML
8 INJECTION INTRAVENOUS
Status: COMPLETED | OUTPATIENT
Start: 2024-12-17 | End: 2024-12-17

## 2024-12-17 RX ADMIN — GADOBUTROL 8 ML: 604.72 INJECTION INTRAVENOUS at 04:12

## 2024-12-18 ENCOUNTER — TELEPHONE (OUTPATIENT)
Dept: NEUROLOGY | Facility: CLINIC | Age: 79
End: 2024-12-18

## 2024-12-18 ENCOUNTER — OFFICE VISIT (OUTPATIENT)
Dept: NEUROLOGY | Facility: CLINIC | Age: 79
End: 2024-12-18
Payer: MEDICARE

## 2024-12-18 DIAGNOSIS — R25.1 TREMOR: ICD-10-CM

## 2024-12-18 DIAGNOSIS — G20.C PARKINSONISM, UNSPECIFIED PARKINSONISM TYPE: ICD-10-CM

## 2024-12-18 DIAGNOSIS — C71.9 GLIOBLASTOMA MULTIFORME OF BRAIN: Primary | ICD-10-CM

## 2024-12-18 DIAGNOSIS — G40.909 SEIZURE DISORDER: ICD-10-CM

## 2024-12-18 RX ORDER — CARBIDOPA AND LEVODOPA 25; 100 MG/1; MG/1
1 TABLET ORAL 3 TIMES DAILY
Qty: 270 TABLET | Refills: 3 | Status: SHIPPED | OUTPATIENT
Start: 2024-12-18 | End: 2025-12-18

## 2024-12-18 NOTE — PROGRESS NOTES
Subjective:       Patient ID: Chacorta Gómez is a 79 y.o. male.    Chief Complaint: glioblastoma    HPI  Oncologic History:   2/2023: admitted to outside hospital for new onset seizure, he was started on keppra. MRI brain was done and was reportedly suspicious for subacute infarct.   4-5/2023: was in [place] for a work trip, had recurrent seizures, was hospitalized there for 10 days.   6/2023: hospitalized for breakthrough seizures, lacosamide was added, underwent repeat MRI brain which showed 4.2 x 2.6 x 4.1 cm heterogeneous enhancing hemorrhagic medial right frontal lesion. A 2nd lesion along the planum sphenoidale has a dural base but appears to also involve the inferior frontal lobe parenchyma. Patient was seen by neurosurgery and underwent subtotal resection. Pathology consistent with GBM.   8/8-28/2023: chemoradiation to 40Gy over 15fx (Alexa Lisa)  10/2023: C1 TMZ @150mg/m2  12/2023: C2 TMZ @200mg/m2  2/7-11/2024: C3 TMZ @200mg/m2  3/28-4/1/2024: C4 TMZ (reportedly did not get enough of one of the pill strengths from the pharmacy, so took his full dose of 390mg days 1-2 and only 140mg days 3-5)  5/15-19/2024: C5 TMZ @200mg/m2  6/2024: C6 TMZ @200mg/m2  11/11/2024: ED visit for weakness and worsening tremor.  He is unsure if this is the time that he ran out of his carbidopa levodopa, although these symptoms would be consistent with levodopa withdrawal.  12/17/2024: MRI brain concerning for POD    Chacorta is feeling overall fine.  He denies new neurologic symptoms or breakthrough seizures.  His tremors are worse, and he has been out of carbidopa levodopa for at least a few weeks.  He is having trouble keeping his medications straight.  He is also feeling financial strain from his medical bills.      Past Medical History:   Diagnosis Date    Benign localized hyperplasia of prostate with urinary obstruction and other lower urinary tract symptoms (LUTS)(600.21)     Brain lesion     CVA (cerebral vascular  accident)     [facility]    Essential hypertension 07/12/2016    Glaucoma     bilat    Multinodular goiter     Seizures     Spontaneous ecchymoses     Unspecified essential hypertension       Current Outpatient Medications   Medication Sig Dispense Refill    carbidopa-levodopa  mg (SINEMET)  mg per tablet Take 1 tablet by mouth 3 (three) times daily. 270 tablet 3    chlorpheniramine (CHLOR-TRIMETON) 4 mg tablet Take 4 mg by mouth daily as needed (sneezing attacks).      divalproex (DEPAKOTE) 500 MG TbEC Take 2 tablets (1,000 mg total) by mouth every 12 (twelve) hours. 360 tablet 3    dorzolamide-timolol 2-0.5% (COSOPT) 22.3-6.8 mg/mL ophthalmic solution Place 1 drop into both eyes 2 (two) times daily.      famotidine (PEPCID) 20 MG tablet Take 20 mg by mouth daily as needed for Heartburn.      folic acid (FOLVITE) 1 MG tablet Take 1 tablet (1 mg total) by mouth once daily. 90 tablet 3    furosemide (LASIX) 20 MG tablet TAKE 1 TABLET EVERY DAY AS NEEDED FOR SWELLING 90 tablet 3    hydroCHLOROthiazide (HYDRODIURIL) 12.5 MG Tab Take 1 tablet (12.5 mg total) by mouth once daily. 90 tablet 3    HYDROcodone-acetaminophen (NORCO) 5-325 mg per tablet Take 1 tablet by mouth every 6 (six) hours as needed for Pain. 120 tablet 0    latanoprost 0.005 % ophthalmic solution Place 1 drop into both eyes every evening.      lomustine 100 MG capsule Take 2 capsules (200 mg total) by mouth every 6 weeks. 2 capsule 0    loratadine (CLARITIN) 10 mg tablet Take 10 mg by mouth once daily.      multivitamin Tab Take 1 tablet by mouth once daily. 30 tablet 0    naproxen sodium (ALEVE) 220 mg Cap Take by mouth.      ondansetron (ZOFRAN) 8 MG tablet Take 8mg (1 tab) 30-45 minutes prior to chemotherapy dose and every 8 hours as needed 30 tablet 3    thiamine 100 MG tablet Take 100 mg by mouth once daily.       No current facility-administered medications for this visit.      Past Surgical History:   Procedure Laterality Date     COLONOSCOPY  08/03/2009    Dr. Askew    CRANIOTOMY FOR EXCISION OF INTRACRANIAL TUMOR      CRANIOTOMY, WITH NEOPLASM EXCISION USING COMPUTER-ASSISTED NAVIGATION Right 6/13/2023    Procedure: CRANIOTOMY, WITH NEOPLASM EXCISION USING COMPUTER-ASSISTED NAVIGATION -     RIGHT FRONTAL;  Surgeon: Joao English MD;  Location[facility];  Service: Neurosurgery;  Laterality: Right;    NASAL SEPTUM SURGERY      TONSILLECTOMY        Family History   Problem Relation Name Age of Onset    Heart attack Mother      Hypertension Mother      Cancer Father          prostate     Hypertension Brother      Stroke Brother          mild       Social History     Tobacco Use    Smoking status: Never     Passive exposure: Never    Smokeless tobacco: Never   Substance Use Topics    Alcohol use: Not Currently     Alcohol/week: 14.0 standard drinks of alcohol     Types: 7 Glasses of wine, 7 Cans of beer per week    Drug use: Never      Review of Systems  KPS: 80        Objective:      There were no vitals filed for this visit.  Telehealth visit    (Prior)  NEUROLOGICAL EXAMINATION:     MENTAL STATUS   Attention: normal. Concentration: normal.   Speech: speech is normal   Level of consciousness: alert    CRANIAL NERVES   Cranial nerves II through XII intact.     MOTOR EXAM   Muscle bulk: normal  Right arm tone: increased    Strength   Strength 5/5 throughout.        Rigidity and bradykinesia R>L  Medium-frequency, low to medium-amplitude tremor of the right hand>left, more at rest than with posture or action     SENSORY EXAM   Light touch normal.     GAIT AND COORDINATION      Coordination   Finger to nose coordination: normal       Stooped posture with mild shuffling. En bloc, multistep turn.        MRI brain from 12/18/2024 was personally visualized and compared to prior.  Per my read, this is consistent with early multifocal progression of disease.    Assessment:       Problem List Items Addressed This Visit          Neuro    Seizure  disorder       Oncology    Glioblastoma multiforme of brain - Primary    Relevant Medications    lomustine 100 MG capsule    Other Relevant Orders    Physician communication order    Physician communication order    Physician communication order    Physician communication order    Physician communication order    Physician communication order    CBC Auto Differential    Comprehensive Metabolic Panel    Ambulatory referral/consult to Home Health     Other Visit Diagnoses       Tremor        Relevant Medications    carbidopa-levodopa  mg (SINEMET)  mg per tablet    Other Relevant Orders    Ambulatory referral/consult to Home Health    Parkinsonism, unspecified Parkinsonism type        Relevant Orders    Ambulatory referral/consult to Home Health                  Plan:       Chacorta Gómez is a 79 y.o. male with a glioblastoma, MGMT-methylated, IDH-wt, WHO grade 4 s/p subtotal resection and chemoradiation to 40Gy over 15fx and adjuvant TMZ x6 cycles completed 6/2024 presenting for treatment recommendations. Tumor diagnosis was heralded by seizure.     Glioblastoma  Unfortunately, his MRI brain from yesterday shows multifocal progression of disease with some changes in diffusion weighted imaging, increased CBV, and new punctate areas of enhancement.  I discussed his case with his neurosurgeon Dr. English and radiation oncologist Dr. Lisa, and we believe that systemic therapy is the best next step.  I have prescribed lomustine 110mg/m2 = 200mg q6 weeks.  We will monitor with CBC at weeks 4 and 5, and CBC/CMP with follow-up at week 6.    Tremor/parkinsonism  Pill rolling tremor, bradykinesia, and rigidity R>L along with stooped posture and shuffling consistent with parkinsonism. Symptoms mildly improved on Sinemet 25-100mg 1.5 tab tid. He is also taking a supplement called Tremodone. He feels his tremor is somewhat improved with this treatment.    Represcribed Sinemet today and discussed the danger of  stopping this medication suddenly.  I have also placed a referral for home health for PT, OT, and skilled nursing to help with physical symptoms and medication mismanagement.    Seizures  Continue Depakote 1000mg bid              The patient location is: home in [place]  The chief complaint leading to consultation is: glioblastoma    Visit type: audiovisual    Face to Face time with patient: 13 min    Visit today included increased complexity associated with the care of the episodic problem seizures, glioblastoma, tremor/parkinsonism addressed and managing the longitudinal care of the patient due to the serious and/or complex managed problem(s) glioblastoma/seizures/tremor/parkinsonism.     Lucita Macias MD  Department of Neurology  Neuro-Oncology, Movement Disorders

## 2024-12-18 NOTE — TELEPHONE ENCOUNTER
SW called patient to discuss financial assistance resources. He states that he is looking for financial support. He states that he doesn't receive SSDI benefits. SW urged him to apply for SSDI and will send further information after call.    Patient states that he has huge medical bills- SW inquired if he has applied for [facility] financial assistance; patient denied knowing about these services. ERIN explained that [facility] provides financial assistance for emergency and medical care for patients who are residents of [place] and [place] but are unable to pay.  ERIN will also provide application information to him there.     ERIN also detailed a few resources that may be helpful, such as the American Cancer Society, the [employer] Foundation, the American Brain Tumor Association (ABTA), and more. ERIN will send resource information through the portal. Patient requested that ERIN also send to his email at [email or web address].     ERIN will also include contact information, and encouraged him to reach out with any questions or concerns.     ERIN will also follow up with [facility] to screen for [facility] patient assistance fund.

## 2024-12-19 NOTE — TELEPHONE ENCOUNTER
SW called patient to inform him that message was sent through the portal including instructions on how to apply for SSA and Dr. Macias's physician statement supporting his eligibility for expedited approval for disability. He expressed appreciation for the information and assistance. SW encouraged him to reach out with any questions or concerns.

## 2024-12-20 PROCEDURE — G0180 MD CERTIFICATION HHA PATIENT: HCPCS | Mod: ,,, | Performed by: PSYCHIATRY & NEUROLOGY

## 2024-12-27 ENCOUNTER — DOCUMENTATION ONLY (OUTPATIENT)
Dept: HEMATOLOGY/ONCOLOGY | Facility: CLINIC | Age: 79
End: 2024-12-27
Payer: MEDICARE

## 2024-12-27 NOTE — PROGRESS NOTES
Received referral from the clinic that the patient needed financial resources. Attempted to contact the patient to discuss. There was no answer so I left a detailed message along with my contact information.

## 2024-12-30 DIAGNOSIS — C71.9 GLIOBLASTOMA MULTIFORME OF BRAIN: Primary | ICD-10-CM

## 2025-01-01 ENCOUNTER — TELEPHONE (OUTPATIENT)
Dept: ADMINISTRATIVE | Facility: OTHER | Age: 80
End: 2025-01-01

## 2025-01-02 DIAGNOSIS — C71.9 GLIOBLASTOMA MULTIFORME OF BRAIN: Primary | ICD-10-CM

## 2025-01-09 DIAGNOSIS — G20.C PARKINSONISM, UNSPECIFIED PARKINSONISM TYPE: ICD-10-CM

## 2025-01-09 RX ORDER — FOLIC ACID 1 MG/1
1000 TABLET ORAL
Qty: 90 TABLET | Refills: 3 | Status: SHIPPED | OUTPATIENT
Start: 2025-01-09

## 2025-01-13 ENCOUNTER — TELEPHONE (OUTPATIENT)
Facility: CLINIC | Age: 80
End: 2025-01-13
Payer: MEDICARE

## 2025-01-13 NOTE — TELEPHONE ENCOUNTER
Called patient to follow up on lomustine medication, patient states that he will take his first dose tonight.

## 2025-01-31 ENCOUNTER — DOCUMENT SCAN (OUTPATIENT)
Dept: HOME HEALTH SERVICES | Facility: HOSPITAL | Age: 80
End: 2025-01-31
Payer: MEDICARE

## 2025-02-07 ENCOUNTER — EXTERNAL HOME HEALTH (OUTPATIENT)
Dept: HOME HEALTH SERVICES | Facility: HOSPITAL | Age: 80
End: 2025-02-07
Payer: MEDICARE

## 2025-02-12 DIAGNOSIS — I10 ESSENTIAL HYPERTENSION: ICD-10-CM

## 2025-02-12 RX ORDER — HYDROCHLOROTHIAZIDE 12.5 MG/1
12.5 TABLET ORAL
Qty: 90 TABLET | Refills: 3 | Status: SHIPPED | OUTPATIENT
Start: 2025-02-12

## 2025-02-12 NOTE — TELEPHONE ENCOUNTER
LOV 11/21/2024 with SHAQ Conner Dr. out of the office 02/18/2025  Forwarded to ABEL Morales NP for review

## 2025-02-17 ENCOUNTER — LAB VISIT (OUTPATIENT)
Dept: LAB | Facility: HOSPITAL | Age: 80
End: 2025-02-17
Attending: PSYCHIATRY & NEUROLOGY
Payer: MEDICARE

## 2025-02-17 DIAGNOSIS — C71.9 GLIOBLASTOMA MULTIFORME OF BRAIN: ICD-10-CM

## 2025-02-17 LAB
BASOPHILS # BLD AUTO: 0.02 K/UL (ref 0–0.2)
BASOPHILS NFR BLD: 0.4 % (ref 0–1.9)
DIFFERENTIAL METHOD BLD: ABNORMAL
EOSINOPHIL # BLD AUTO: 0 K/UL (ref 0–0.5)
EOSINOPHIL NFR BLD: 0.8 % (ref 0–8)
ERYTHROCYTE [DISTWIDTH] IN BLOOD BY AUTOMATED COUNT: 16.2 % (ref 11.5–14.5)
HCT VFR BLD AUTO: 37.6 % (ref 40–54)
HGB BLD-MCNC: 12.4 G/DL (ref 14–18)
IMM GRANULOCYTES # BLD AUTO: 0.01 K/UL (ref 0–0.04)
IMM GRANULOCYTES NFR BLD AUTO: 0.2 % (ref 0–0.5)
LYMPHOCYTES # BLD AUTO: 1.2 K/UL (ref 1–4.8)
LYMPHOCYTES NFR BLD: 24.9 % (ref 18–48)
MCH RBC QN AUTO: 33.5 PG (ref 27–31)
MCHC RBC AUTO-ENTMCNC: 33 G/DL (ref 32–36)
MCV RBC AUTO: 102 FL (ref 82–98)
MONOCYTES # BLD AUTO: 0.6 K/UL (ref 0.3–1)
MONOCYTES NFR BLD: 12.3 % (ref 4–15)
NEUTROPHILS # BLD AUTO: 3.1 K/UL (ref 1.8–7.7)
NEUTROPHILS NFR BLD: 61.4 % (ref 38–73)
NRBC BLD-RTO: 0 /100 WBC
PLATELET # BLD AUTO: 96 K/UL (ref 150–450)
PMV BLD AUTO: 9.8 FL (ref 9.2–12.9)
RBC # BLD AUTO: 3.7 M/UL (ref 4.6–6.2)
WBC # BLD AUTO: 4.97 K/UL (ref 3.9–12.7)

## 2025-02-17 PROCEDURE — 36415 COLL VENOUS BLD VENIPUNCTURE: CPT | Mod: PO | Performed by: PSYCHIATRY & NEUROLOGY

## 2025-02-17 PROCEDURE — 85025 COMPLETE CBC W/AUTO DIFF WBC: CPT | Performed by: PSYCHIATRY & NEUROLOGY

## 2025-02-26 ENCOUNTER — LAB VISIT (OUTPATIENT)
Dept: LAB | Facility: HOSPITAL | Age: 80
End: 2025-02-26
Attending: PSYCHIATRY & NEUROLOGY
Payer: MEDICARE

## 2025-02-26 ENCOUNTER — TELEPHONE (OUTPATIENT)
Facility: CLINIC | Age: 80
End: 2025-02-26
Payer: MEDICARE

## 2025-02-26 DIAGNOSIS — C71.9 GLIOBLASTOMA MULTIFORME OF BRAIN: ICD-10-CM

## 2025-02-26 LAB
ALBUMIN SERPL BCP-MCNC: 3.8 G/DL (ref 3.5–5.2)
ALP SERPL-CCNC: 35 U/L (ref 40–150)
ALT SERPL W/O P-5'-P-CCNC: 8 U/L (ref 10–44)
ANION GAP SERPL CALC-SCNC: 8 MMOL/L (ref 8–16)
AST SERPL-CCNC: 36 U/L (ref 10–40)
BASOPHILS # BLD AUTO: 0.01 K/UL (ref 0–0.2)
BASOPHILS NFR BLD: 0.3 % (ref 0–1.9)
BILIRUB SERPL-MCNC: 0.4 MG/DL (ref 0.1–1)
BUN SERPL-MCNC: 19 MG/DL (ref 8–23)
CALCIUM SERPL-MCNC: 9.5 MG/DL (ref 8.7–10.5)
CHLORIDE SERPL-SCNC: 102 MMOL/L (ref 95–110)
CO2 SERPL-SCNC: 29 MMOL/L (ref 23–29)
CREAT SERPL-MCNC: 0.6 MG/DL (ref 0.5–1.4)
DIFFERENTIAL METHOD BLD: ABNORMAL
EOSINOPHIL # BLD AUTO: 0.2 K/UL (ref 0–0.5)
EOSINOPHIL NFR BLD: 4.1 % (ref 0–8)
ERYTHROCYTE [DISTWIDTH] IN BLOOD BY AUTOMATED COUNT: 16.2 % (ref 11.5–14.5)
EST. GFR  (NO RACE VARIABLE): >60 ML/MIN/1.73 M^2
GLUCOSE SERPL-MCNC: 84 MG/DL (ref 70–110)
HCT VFR BLD AUTO: 36.8 % (ref 40–54)
HGB BLD-MCNC: 12.4 G/DL (ref 14–18)
IMM GRANULOCYTES # BLD AUTO: 0.01 K/UL (ref 0–0.04)
IMM GRANULOCYTES NFR BLD AUTO: 0.3 % (ref 0–0.5)
LYMPHOCYTES # BLD AUTO: 1.9 K/UL (ref 1–4.8)
LYMPHOCYTES NFR BLD: 52.9 % (ref 18–48)
MCH RBC QN AUTO: 33.7 PG (ref 27–31)
MCHC RBC AUTO-ENTMCNC: 33.7 G/DL (ref 32–36)
MCV RBC AUTO: 100 FL (ref 82–98)
MONOCYTES # BLD AUTO: 0.5 K/UL (ref 0.3–1)
MONOCYTES NFR BLD: 13.5 % (ref 4–15)
NEUTROPHILS # BLD AUTO: 1.1 K/UL (ref 1.8–7.7)
NEUTROPHILS NFR BLD: 28.9 % (ref 38–73)
NRBC BLD-RTO: 0 /100 WBC
PLATELET # BLD AUTO: 128 K/UL (ref 150–450)
PMV BLD AUTO: 10.2 FL (ref 9.2–12.9)
POTASSIUM SERPL-SCNC: 4.4 MMOL/L (ref 3.5–5.1)
PROT SERPL-MCNC: 6.6 G/DL (ref 6–8.4)
RBC # BLD AUTO: 3.68 M/UL (ref 4.6–6.2)
SODIUM SERPL-SCNC: 139 MMOL/L (ref 136–145)
WBC # BLD AUTO: 3.63 K/UL (ref 3.9–12.7)

## 2025-02-26 PROCEDURE — 85025 COMPLETE CBC W/AUTO DIFF WBC: CPT | Performed by: PSYCHIATRY & NEUROLOGY

## 2025-02-26 PROCEDURE — 80053 COMPREHEN METABOLIC PANEL: CPT | Performed by: PSYCHIATRY & NEUROLOGY

## 2025-02-26 PROCEDURE — 36415 COLL VENOUS BLD VENIPUNCTURE: CPT | Mod: PO | Performed by: PSYCHIATRY & NEUROLOGY

## 2025-02-26 NOTE — TELEPHONE ENCOUNTER
Called pt to remind him of virtual appointment with Dr. Macias today at 10am. Pt said he would be logging on.

## 2025-02-27 ENCOUNTER — TELEPHONE (OUTPATIENT)
Facility: CLINIC | Age: 80
End: 2025-02-27
Payer: MEDICARE

## 2025-02-27 ENCOUNTER — OFFICE VISIT (OUTPATIENT)
Facility: CLINIC | Age: 80
End: 2025-02-27
Payer: MEDICARE

## 2025-02-27 DIAGNOSIS — G20.C PARKINSONISM, UNSPECIFIED PARKINSONISM TYPE: ICD-10-CM

## 2025-02-27 DIAGNOSIS — G40.909 SEIZURE DISORDER: ICD-10-CM

## 2025-02-27 DIAGNOSIS — C71.9 GLIOBLASTOMA MULTIFORME OF BRAIN: Primary | ICD-10-CM

## 2025-02-27 PROCEDURE — 98007 SYNCH AUDIO-VIDEO EST HI 40: CPT | Mod: 95,,, | Performed by: PSYCHIATRY & NEUROLOGY

## 2025-02-27 PROCEDURE — G2211 COMPLEX E/M VISIT ADD ON: HCPCS | Mod: 95,,, | Performed by: PSYCHIATRY & NEUROLOGY

## 2025-02-27 NOTE — PROGRESS NOTES
Subjective:       Patient ID: Chacorta Gómez is a 79 y.o. male.    Chief Complaint: glioblastoma    HPI  Oncologic History:   2/2023: admitted to outside hospital for new onset seizure, he was started on keppra. MRI brain was done and was reportedly suspicious for subacute infarct.   4-5/2023: was in [place] for a work trip, had recurrent seizures, was hospitalized there for 10 days.   6/2023: hospitalized for breakthrough seizures, lacosamide was added, underwent repeat MRI brain which showed 4.2 x 2.6 x 4.1 cm heterogeneous enhancing hemorrhagic medial right frontal lesion. A 2nd lesion along the planum sphenoidale has a dural base but appears to also involve the inferior frontal lobe parenchyma. Patient was seen by neurosurgery and underwent subtotal resection. Pathology consistent with GBM.   8/8-28/2023: chemoradiation to 40Gy over 15fx (Alexa Lisa)  10/2023: C1 TMZ @150mg/m2  12/2023: C2 TMZ @200mg/m2  2/7-11/2024: C3 TMZ @200mg/m2  3/28-4/1/2024: C4 TMZ (reportedly did not get enough of one of the pill strengths from the pharmacy, so took his full dose of 390mg days 1-2 and only 140mg days 3-5)  5/15-19/2024: C5 TMZ @200mg/m2  6/2024: C6 TMZ @200mg/m2  11/11/2024: ED visit for weakness and worsening tremor.  He is unsure if this is the time that he ran out of his carbidopa levodopa, although these symptoms would be consistent with levodopa withdrawal.  12/17/2024: MRI brain concerning for POD  1/9/2025: C1D1 lomustine 110mg/m2 = 200mg    Chacorta is feeling overall fine.  He denies new symptoms.      Past Medical History:   Diagnosis Date    Benign localized hyperplasia of prostate with urinary obstruction and other lower urinary tract symptoms (LUTS)(600.21)     Brain lesion     CVA (cerebral vascular accident)     [facility]    Essential hypertension 07/12/2016    Glaucoma     bilat    Multinodular goiter     Seizures     Spontaneous ecchymoses     Unspecified essential hypertension       Current  Outpatient Medications   Medication Sig Dispense Refill    carbidopa-levodopa  mg (SINEMET)  mg per tablet Take 1 tablet by mouth 3 (three) times daily. 270 tablet 3    chlorpheniramine (CHLOR-TRIMETON) 4 mg tablet Take 4 mg by mouth daily as needed (sneezing attacks).      divalproex (DEPAKOTE) 500 MG TbEC Take 2 tablets (1,000 mg total) by mouth every 12 (twelve) hours. 360 tablet 3    dorzolamide-timolol 2-0.5% (COSOPT) 22.3-6.8 mg/mL ophthalmic solution Place 1 drop into both eyes 2 (two) times daily.      famotidine (PEPCID) 20 MG tablet Take 20 mg by mouth daily as needed for Heartburn.      folic acid (FOLVITE) 1 MG tablet TAKE 1 TABLET ONE TIME DAILY 90 tablet 3    furosemide (LASIX) 20 MG tablet TAKE 1 TABLET EVERY DAY AS NEEDED FOR SWELLING 90 tablet 3    hydroCHLOROthiazide 12.5 MG Tab TAKE 1 TABLET ONE TIME DAILY 90 tablet 3    HYDROcodone-acetaminophen (NORCO) 5-325 mg per tablet Take 1 tablet by mouth every 6 (six) hours as needed for Pain. 120 tablet 0    latanoprost 0.005 % ophthalmic solution Place 1 drop into both eyes every evening.      loratadine (CLARITIN) 10 mg tablet Take 10 mg by mouth once daily.      multivitamin Tab Take 1 tablet by mouth once daily. 30 tablet 0    naproxen sodium (ALEVE) 220 mg Cap Take by mouth.      ondansetron (ZOFRAN) 8 MG tablet Take 8mg (1 tab) 30-45 minutes prior to chemotherapy dose and every 8 hours as needed 30 tablet 3    thiamine 100 MG tablet Take 100 mg by mouth once daily.       No current facility-administered medications for this visit.      Past Surgical History:   Procedure Laterality Date    COLONOSCOPY  08/03/2009    Dr. Askew    CRANIOTOMY FOR EXCISION OF INTRACRANIAL TUMOR      CRANIOTOMY, WITH NEOPLASM EXCISION USING COMPUTER-ASSISTED NAVIGATION Right 6/13/2023    Procedure: CRANIOTOMY, WITH NEOPLASM EXCISION USING COMPUTER-ASSISTED NAVIGATION -     RIGHT FRONTAL;  Surgeon: Joao English MD;  Location: [facility];  Service:  Neurosurgery;  Laterality: Right;    NASAL SEPTUM SURGERY      TONSILLECTOMY        Family History   Problem Relation Name Age of Onset    Heart attack Mother      Hypertension Mother      Cancer Father          prostate     Hypertension Brother      Stroke Brother          mild       Social History     Tobacco Use    Smoking status: Never     Passive exposure: Never    Smokeless tobacco: Never   Substance Use Topics    Alcohol use: Not Currently     Alcohol/week: 14.0 standard drinks of alcohol     Types: 7 Glasses of wine, 7 Cans of beer per week    Drug use: Never      Review of Systems  KPS: 80        Objective:      There were no vitals filed for this visit.  Telehealth visit    (Prior)  NEUROLOGICAL EXAMINATION:     MENTAL STATUS   Attention: normal. Concentration: normal.   Speech: speech is normal   Level of consciousness: alert    CRANIAL NERVES   Cranial nerves II through XII intact.     MOTOR EXAM   Muscle bulk: normal  Right arm tone: increased    Strength   Strength 5/5 throughout.        Rigidity and bradykinesia R>L  Medium-frequency, low to medium-amplitude tremor of the right hand>left, more at rest than with posture or action     SENSORY EXAM   Light touch normal.     GAIT AND COORDINATION      Coordination   Finger to nose coordination: normal       Stooped posture with mild shuffling. En bloc, multistep turn.        MRI brain from 12/18/2024 was personally visualized and compared to prior.  Per my read, this is consistent with early multifocal progression of disease.    Assessment:       Problem List Items Addressed This Visit          Neuro    Seizure disorder       Oncology    Glioblastoma multiforme of brain - Primary    Relevant Orders    MRI Brain (Tumor with Perfusion) W W/O Contrast (XPD)     Other Visit Diagnoses         Parkinsonism, unspecified Parkinsonism type                        Plan:       Chacorta Gómez is a 79 y.o. male with a glioblastoma, MGMT-methylated, IDH-wt, WHO grade  4 s/p subtotal resection and chemoradiation to 40Gy over 15fx and adjuvant TMZ x6 cycles completed 6/2024 presenting for treatment recommendations. Tumor diagnosis was heralded by seizure.     Glioblastoma  Laboratory results are adequate for continuation of C2 lomustine 110mg/m2 = 200mg q6 weeks.  We will monitor with CBC at weeks 4 and 5, and CBC/CMP with follow-up at week 6. Next MRI due after C2, prior to f/u.    Tremor/parkinsonism  Pill rolling tremor, bradykinesia, and rigidity R>L along with stooped posture and shuffling consistent with parkinsonism. Symptoms mildly improved on Sinemet 25-100mg 1.5 tab tid. He is also taking a supplement called Tremodone. He feels his tremor is somewhat improved with this treatment.    I previously placed a referral for home health for PT, OT, and skilled nursing to help with physical symptoms and medication mismanagement.    Seizures  Continue Depakote 1000mg bid              The patient location is: home in [place]  The chief complaint leading to consultation is: glioblastoma    Visit type: audiovisual        Visit today included increased complexity associated with the care of the episodic problem seizures, glioblastoma, tremor/parkinsonism addressed and managing the longitudinal care of the patient due to the serious and/or complex managed problem(s) glioblastoma/seizures/tremor/parkinsonism.     Lucita Macias MD  Department of Neurology  Neuro-Oncology, Movement Disorders

## 2025-03-11 ENCOUNTER — DOCUMENT SCAN (OUTPATIENT)
Dept: HOME HEALTH SERVICES | Facility: HOSPITAL | Age: 80
End: 2025-03-11
Payer: MEDICARE

## 2025-03-27 ENCOUNTER — LAB VISIT (OUTPATIENT)
Dept: LAB | Facility: HOSPITAL | Age: 80
End: 2025-03-27
Attending: PSYCHIATRY & NEUROLOGY
Payer: MEDICARE

## 2025-03-27 DIAGNOSIS — C71.9 GLIOBLASTOMA MULTIFORME OF BRAIN: ICD-10-CM

## 2025-03-27 LAB
ABSOLUTE EOSINOPHIL (OHS): 0.41 K/UL
ABSOLUTE MONOCYTE (OHS): 0.79 K/UL (ref 0.3–1)
ABSOLUTE NEUTROPHIL COUNT (OHS): 1.94 K/UL (ref 1.8–7.7)
ALBUMIN SERPL BCP-MCNC: 3.7 G/DL (ref 3.5–5.2)
ALP SERPL-CCNC: 42 UNIT/L (ref 40–150)
ALT SERPL W/O P-5'-P-CCNC: 11 UNIT/L (ref 10–44)
ANION GAP (OHS): 8 MMOL/L (ref 8–16)
AST SERPL-CCNC: 27 UNIT/L (ref 11–45)
BASOPHILS # BLD AUTO: 0.02 K/UL
BASOPHILS NFR BLD AUTO: 0.5 %
BILIRUB SERPL-MCNC: 0.6 MG/DL (ref 0.1–1)
BUN SERPL-MCNC: 18 MG/DL (ref 8–23)
CALCIUM SERPL-MCNC: 9.4 MG/DL (ref 8.7–10.5)
CHLORIDE SERPL-SCNC: 104 MMOL/L (ref 95–110)
CO2 SERPL-SCNC: 27 MMOL/L (ref 23–29)
CREAT SERPL-MCNC: 0.6 MG/DL (ref 0.5–1.4)
ERYTHROCYTE [DISTWIDTH] IN BLOOD BY AUTOMATED COUNT: 16.5 % (ref 11.5–14.5)
GFR SERPLBLD CREATININE-BSD FMLA CKD-EPI: >60 ML/MIN/1.73/M2
GLUCOSE SERPL-MCNC: 92 MG/DL (ref 70–110)
HCT VFR BLD AUTO: 37.4 % (ref 40–54)
HGB BLD-MCNC: 12.5 GM/DL (ref 14–18)
IMM GRANULOCYTES # BLD AUTO: 0.02 K/UL (ref 0–0.04)
IMM GRANULOCYTES NFR BLD AUTO: 0.5 % (ref 0–0.5)
LYMPHOCYTES # BLD AUTO: 1.13 K/UL (ref 1–4.8)
MCH RBC QN AUTO: 33.6 PG (ref 27–50)
MCHC RBC AUTO-ENTMCNC: 33.4 G/DL (ref 32–36)
MCV RBC AUTO: 101 FL (ref 82–98)
NUCLEATED RBC (/100WBC) (OHS): 0 /100 WBC
PLATELET # BLD AUTO: 113 K/UL (ref 150–450)
PMV BLD AUTO: 10.2 FL (ref 9.2–12.9)
POTASSIUM SERPL-SCNC: 4.6 MMOL/L (ref 3.5–5.1)
PROT SERPL-MCNC: 6.5 GM/DL (ref 6–8.4)
RBC # BLD AUTO: 3.72 M/UL (ref 4.6–6.2)
RELATIVE EOSINOPHIL (OHS): 9.5 %
RELATIVE LYMPHOCYTE (OHS): 26.2 % (ref 18–48)
RELATIVE MONOCYTE (OHS): 18.3 % (ref 4–15)
RELATIVE NEUTROPHIL (OHS): 45 % (ref 38–73)
SODIUM SERPL-SCNC: 139 MMOL/L (ref 136–145)
WBC # BLD AUTO: 4.31 K/UL (ref 3.9–12.7)

## 2025-03-27 PROCEDURE — 85025 COMPLETE CBC W/AUTO DIFF WBC: CPT

## 2025-03-27 PROCEDURE — 36415 COLL VENOUS BLD VENIPUNCTURE: CPT | Mod: PO

## 2025-03-27 PROCEDURE — 80053 COMPREHEN METABOLIC PANEL: CPT

## 2025-04-03 ENCOUNTER — LAB VISIT (OUTPATIENT)
Dept: LAB | Facility: HOSPITAL | Age: 80
End: 2025-04-03
Attending: PSYCHIATRY & NEUROLOGY
Payer: MEDICARE

## 2025-04-03 DIAGNOSIS — C71.9 GLIOBLASTOMA MULTIFORME OF BRAIN: ICD-10-CM

## 2025-04-03 LAB
ABSOLUTE EOSINOPHIL (OHS): 0.93 K/UL
ABSOLUTE MONOCYTE (OHS): 0.78 K/UL (ref 0.3–1)
ABSOLUTE NEUTROPHIL COUNT (OHS): 1.94 K/UL (ref 1.8–7.7)
BASOPHILS # BLD AUTO: 0.02 K/UL
BASOPHILS NFR BLD AUTO: 0.4 %
ERYTHROCYTE [DISTWIDTH] IN BLOOD BY AUTOMATED COUNT: 15.7 % (ref 11.5–14.5)
HCT VFR BLD AUTO: 36.4 % (ref 40–54)
HGB BLD-MCNC: 12.2 GM/DL (ref 14–18)
IMM GRANULOCYTES # BLD AUTO: 0.01 K/UL (ref 0–0.04)
IMM GRANULOCYTES NFR BLD AUTO: 0.2 % (ref 0–0.5)
LYMPHOCYTES # BLD AUTO: 0.96 K/UL (ref 1–4.8)
MCH RBC QN AUTO: 33.7 PG (ref 27–31)
MCHC RBC AUTO-ENTMCNC: 33.5 G/DL (ref 32–36)
MCV RBC AUTO: 101 FL (ref 82–98)
NUCLEATED RBC (/100WBC) (OHS): 0 /100 WBC
PLATELET # BLD AUTO: 100 K/UL (ref 150–450)
PMV BLD AUTO: 10.7 FL (ref 9.2–12.9)
RBC # BLD AUTO: 3.62 M/UL (ref 4.6–6.2)
RELATIVE EOSINOPHIL (OHS): 20 %
RELATIVE LYMPHOCYTE (OHS): 20.7 % (ref 18–48)
RELATIVE MONOCYTE (OHS): 16.8 % (ref 4–15)
RELATIVE NEUTROPHIL (OHS): 41.9 % (ref 38–73)
WBC # BLD AUTO: 4.64 K/UL (ref 3.9–12.7)

## 2025-04-03 PROCEDURE — 85025 COMPLETE CBC W/AUTO DIFF WBC: CPT

## 2025-04-03 PROCEDURE — 36415 COLL VENOUS BLD VENIPUNCTURE: CPT | Mod: PO

## 2025-04-10 ENCOUNTER — LAB VISIT (OUTPATIENT)
Dept: LAB | Facility: HOSPITAL | Age: 80
End: 2025-04-10
Attending: PSYCHIATRY & NEUROLOGY
Payer: MEDICARE

## 2025-04-10 DIAGNOSIS — C71.9 GLIOBLASTOMA MULTIFORME OF BRAIN: ICD-10-CM

## 2025-04-10 LAB
ABSOLUTE EOSINOPHIL (OHS): 0.99 K/UL
ABSOLUTE MONOCYTE (OHS): 0.8 K/UL (ref 0.3–1)
ABSOLUTE NEUTROPHIL COUNT (OHS): 2.14 K/UL (ref 1.8–7.7)
ALBUMIN SERPL BCP-MCNC: 3.5 G/DL (ref 3.5–5.2)
ALP SERPL-CCNC: 44 UNIT/L (ref 40–150)
ALT SERPL W/O P-5'-P-CCNC: 6 UNIT/L (ref 10–44)
ANION GAP (OHS): 5 MMOL/L (ref 8–16)
AST SERPL-CCNC: 30 UNIT/L (ref 11–45)
BASOPHILS # BLD AUTO: 0.01 K/UL
BASOPHILS NFR BLD AUTO: 0.2 %
BILIRUB SERPL-MCNC: 0.6 MG/DL (ref 0.1–1)
BUN SERPL-MCNC: 21 MG/DL (ref 8–23)
CALCIUM SERPL-MCNC: 9.5 MG/DL (ref 8.7–10.5)
CHLORIDE SERPL-SCNC: 104 MMOL/L (ref 95–110)
CO2 SERPL-SCNC: 30 MMOL/L (ref 23–29)
CREAT SERPL-MCNC: 0.7 MG/DL (ref 0.5–1.4)
ERYTHROCYTE [DISTWIDTH] IN BLOOD BY AUTOMATED COUNT: 15.5 % (ref 11.5–14.5)
GFR SERPLBLD CREATININE-BSD FMLA CKD-EPI: >60 ML/MIN/1.73/M2
GLUCOSE SERPL-MCNC: 99 MG/DL (ref 70–110)
HCT VFR BLD AUTO: 38 % (ref 40–54)
HGB BLD-MCNC: 12.6 GM/DL (ref 14–18)
IMM GRANULOCYTES # BLD AUTO: 0.01 K/UL (ref 0–0.04)
IMM GRANULOCYTES NFR BLD AUTO: 0.2 % (ref 0–0.5)
LYMPHOCYTES # BLD AUTO: 0.97 K/UL (ref 1–4.8)
MCH RBC QN AUTO: 33.7 PG (ref 27–31)
MCHC RBC AUTO-ENTMCNC: 33.2 G/DL (ref 32–36)
MCV RBC AUTO: 102 FL (ref 82–98)
NUCLEATED RBC (/100WBC) (OHS): 0 /100 WBC
PLATELET # BLD AUTO: 108 K/UL (ref 150–450)
PMV BLD AUTO: 10.6 FL (ref 9.2–12.9)
POTASSIUM SERPL-SCNC: 4.6 MMOL/L (ref 3.5–5.1)
PROT SERPL-MCNC: 6.4 GM/DL (ref 6–8.4)
RBC # BLD AUTO: 3.74 M/UL (ref 4.6–6.2)
RELATIVE EOSINOPHIL (OHS): 20.1 %
RELATIVE LYMPHOCYTE (OHS): 19.7 % (ref 18–48)
RELATIVE MONOCYTE (OHS): 16.3 % (ref 4–15)
RELATIVE NEUTROPHIL (OHS): 43.5 % (ref 38–73)
SODIUM SERPL-SCNC: 139 MMOL/L (ref 136–145)
WBC # BLD AUTO: 4.92 K/UL (ref 3.9–12.7)

## 2025-04-10 PROCEDURE — 80053 COMPREHEN METABOLIC PANEL: CPT

## 2025-04-10 PROCEDURE — 36415 COLL VENOUS BLD VENIPUNCTURE: CPT | Mod: PO

## 2025-04-10 PROCEDURE — 85025 COMPLETE CBC W/AUTO DIFF WBC: CPT

## 2025-04-11 ENCOUNTER — HOSPITAL ENCOUNTER (OUTPATIENT)
Dept: RADIOLOGY | Facility: HOSPITAL | Age: 80
Discharge: HOME | End: 2025-04-11
Attending: PSYCHIATRY & NEUROLOGY
Payer: MEDICARE

## 2025-04-11 ENCOUNTER — OFFICE VISIT (OUTPATIENT)
Dept: NEUROSURGERY | Facility: CLINIC | Age: 80
End: 2025-04-11
Payer: MEDICARE

## 2025-04-11 VITALS
HEIGHT: 72 IN | DIASTOLIC BLOOD PRESSURE: 85 MMHG | BODY MASS INDEX: 22.6 KG/M2 | SYSTOLIC BLOOD PRESSURE: 167 MMHG | WEIGHT: 166.88 LBS | TEMPERATURE: 56 F

## 2025-04-11 DIAGNOSIS — C71.9 GLIOBLASTOMA MULTIFORME OF BRAIN: ICD-10-CM

## 2025-04-11 DIAGNOSIS — C71.9 GLIOBLASTOMA MULTIFORME OF BRAIN: Primary | ICD-10-CM

## 2025-04-11 DIAGNOSIS — G20.A2 PARKINSON'S DISEASE WITHOUT DYSKINESIA, WITH FLUCTUATING MANIFESTATIONS: ICD-10-CM

## 2025-04-11 DIAGNOSIS — G40.909 SEIZURE DISORDER: ICD-10-CM

## 2025-04-11 DIAGNOSIS — E78.5 HYPERLIPIDEMIA, UNSPECIFIED HYPERLIPIDEMIA TYPE: ICD-10-CM

## 2025-04-11 PROCEDURE — 99999 PR PBB SHADOW E&M-EST. PATIENT-LVL III: CPT | Mod: PBBFAC,,, | Performed by: PSYCHIATRY & NEUROLOGY

## 2025-04-11 PROCEDURE — 25500020 PHARM REV CODE 255: Performed by: PSYCHIATRY & NEUROLOGY

## 2025-04-11 PROCEDURE — 3288F FALL RISK ASSESSMENT DOCD: CPT | Mod: CPTII,S$GLB,, | Performed by: PSYCHIATRY & NEUROLOGY

## 2025-04-11 PROCEDURE — 1159F MED LIST DOCD IN RCRD: CPT | Mod: CPTII,S$GLB,, | Performed by: PSYCHIATRY & NEUROLOGY

## 2025-04-11 PROCEDURE — 3079F DIAST BP 80-89 MM HG: CPT | Mod: CPTII,S$GLB,, | Performed by: PSYCHIATRY & NEUROLOGY

## 2025-04-11 PROCEDURE — 70553 MRI BRAIN STEM W/O & W/DYE: CPT | Mod: TC

## 2025-04-11 PROCEDURE — 3077F SYST BP >= 140 MM HG: CPT | Mod: CPTII,S$GLB,, | Performed by: PSYCHIATRY & NEUROLOGY

## 2025-04-11 PROCEDURE — 70553 MRI BRAIN STEM W/O & W/DYE: CPT | Mod: 26,,, | Performed by: RADIOLOGY

## 2025-04-11 PROCEDURE — 1126F AMNT PAIN NOTED NONE PRSNT: CPT | Mod: CPTII,S$GLB,, | Performed by: PSYCHIATRY & NEUROLOGY

## 2025-04-11 PROCEDURE — G2211 COMPLEX E/M VISIT ADD ON: HCPCS | Mod: S$GLB,,, | Performed by: PSYCHIATRY & NEUROLOGY

## 2025-04-11 PROCEDURE — 99215 OFFICE O/P EST HI 40 MIN: CPT | Mod: S$GLB,,, | Performed by: PSYCHIATRY & NEUROLOGY

## 2025-04-11 PROCEDURE — A9585 GADOBUTROL INJECTION: HCPCS | Performed by: PSYCHIATRY & NEUROLOGY

## 2025-04-11 PROCEDURE — 1101F PT FALLS ASSESS-DOCD LE1/YR: CPT | Mod: CPTII,S$GLB,, | Performed by: PSYCHIATRY & NEUROLOGY

## 2025-04-11 RX ORDER — HEPARIN 100 UNIT/ML
500 SYRINGE INTRAVENOUS
OUTPATIENT
Start: 2025-04-25

## 2025-04-11 RX ORDER — EPINEPHRINE 0.3 MG/.3ML
0.3 INJECTION SUBCUTANEOUS ONCE AS NEEDED
OUTPATIENT
Start: 2025-04-25

## 2025-04-11 RX ORDER — DIVALPROEX SODIUM 500 MG/1
1000 TABLET, DELAYED RELEASE ORAL EVERY 12 HOURS
Qty: 360 TABLET | Refills: 3 | Status: SHIPPED | OUTPATIENT
Start: 2025-04-11

## 2025-04-11 RX ORDER — SODIUM CHLORIDE 0.9 % (FLUSH) 0.9 %
10 SYRINGE (ML) INJECTION
OUTPATIENT
Start: 2025-04-25

## 2025-04-11 RX ORDER — PROCHLORPERAZINE EDISYLATE 5 MG/ML
5 INJECTION INTRAMUSCULAR; INTRAVENOUS ONCE AS NEEDED
Status: CANCELLED | OUTPATIENT
Start: 2025-04-11

## 2025-04-11 RX ORDER — SODIUM CHLORIDE 0.9 % (FLUSH) 0.9 %
10 SYRINGE (ML) INJECTION
OUTPATIENT
Start: 2025-04-11

## 2025-04-11 RX ORDER — DIPHENHYDRAMINE HYDROCHLORIDE 50 MG/ML
50 INJECTION, SOLUTION INTRAMUSCULAR; INTRAVENOUS ONCE AS NEEDED
OUTPATIENT
Start: 2025-04-25

## 2025-04-11 RX ORDER — HEPARIN 100 UNIT/ML
500 SYRINGE INTRAVENOUS
OUTPATIENT
Start: 2025-04-11

## 2025-04-11 RX ORDER — DIPHENHYDRAMINE HYDROCHLORIDE 50 MG/ML
50 INJECTION, SOLUTION INTRAMUSCULAR; INTRAVENOUS ONCE AS NEEDED
OUTPATIENT
Start: 2025-04-11

## 2025-04-11 RX ORDER — EPINEPHRINE 0.3 MG/.3ML
0.3 INJECTION SUBCUTANEOUS ONCE AS NEEDED
OUTPATIENT
Start: 2025-04-11

## 2025-04-11 RX ORDER — GADOBUTROL 604.72 MG/ML
8 INJECTION INTRAVENOUS
Status: COMPLETED | OUTPATIENT
Start: 2025-04-11 | End: 2025-04-11

## 2025-04-11 RX ADMIN — GADOBUTROL 8 ML: 604.72 INJECTION INTRAVENOUS at 10:04

## 2025-04-11 NOTE — PROGRESS NOTES
Subjective:       Patient ID: Chacorta Gómez is a 79 y.o. male.    Chief Complaint: glioblastoma    HPI  Oncologic History:   2/2023: admitted to outside hospital for new onset seizure, he was started on keppra. MRI brain was done and was reportedly suspicious for subacute infarct.   4-5/2023: was in [place] for a work trip, had recurrent seizures, was hospitalized there for 10 days.   6/2023: hospitalized for breakthrough seizures, lacosamide was added, underwent repeat MRI brain which showed 4.2 x 2.6 x 4.1 cm heterogeneous enhancing hemorrhagic medial right frontal lesion. A 2nd lesion along the planum sphenoidale has a dural base but appears to also involve the inferior frontal lobe parenchyma. Patient was seen by neurosurgery and underwent subtotal resection. Pathology consistent with GBM.   8/8-28/2023: chemoradiation to 40Gy over 15fx (Alexa Lisa)  10/2023: C1 TMZ @150mg/m2  12/2023: C2 TMZ @200mg/m2  2/7-11/2024: C3 TMZ @200mg/m2  3/28-4/1/2024: C4 TMZ (reportedly did not get enough of one of the pill strengths from the pharmacy, so took his full dose of 390mg days 1-2 and only 140mg days 3-5)  5/15-19/2024: C5 TMZ @200mg/m2  6/2024: C6 TMZ @200mg/m2  11/11/2024: ED visit for weakness and worsening tremor.  He is unsure if this is the time that he ran out of his carbidopa levodopa, although these symptoms would be consistent with levodopa withdrawal.  12/17/2024: MRI brain concerning for POD  1/9/2025: C1D1 lomustine 110mg/m2 = 200mg  3/5/2025: C2D1 lomustine 110mg/m2 = 200mg  4/11/2025: MRI brain with continued multifocal POD    Chacorta is feeling overall fine.  He denies new symptoms. Tremor is decently controlled with Sinemet 1.5 tabs tid. He is able to make some art.      Past Medical History:   Diagnosis Date    Benign localized hyperplasia of prostate with urinary obstruction and other lower urinary tract symptoms (LUTS)(600.21)     Brain lesion     CVA (cerebral vascular accident)     [facility]    Essential hypertension 07/12/2016    Glaucoma     bilat    Multinodular goiter     Seizures     Spontaneous ecchymoses     Unspecified essential hypertension       Current Outpatient Medications   Medication Sig Dispense Refill    carbidopa-levodopa  mg (SINEMET)  mg per tablet Take 1 tablet by mouth 3 (three) times daily. 270 tablet 3    chlorpheniramine (CHLOR-TRIMETON) 4 mg tablet Take 4 mg by mouth daily as needed (sneezing attacks).      divalproex (DEPAKOTE) 500 MG TbEC Take 2 tablets (1,000 mg total) by mouth every 12 (twelve) hours. 360 tablet 3    dorzolamide-timolol 2-0.5% (COSOPT) 22.3-6.8 mg/mL ophthalmic solution Place 1 drop into both eyes 2 (two) times daily.      famotidine (PEPCID) 20 MG tablet Take 20 mg by mouth daily as needed for Heartburn.      folic acid (FOLVITE) 1 MG tablet TAKE 1 TABLET ONE TIME DAILY 90 tablet 3    furosemide (LASIX) 20 MG tablet TAKE 1 TABLET EVERY DAY AS NEEDED FOR SWELLING 90 tablet 3    hydroCHLOROthiazide 12.5 MG Tab TAKE 1 TABLET ONE TIME DAILY 90 tablet 3    HYDROcodone-acetaminophen (NORCO) 5-325 mg per tablet Take 1 tablet by mouth every 6 (six) hours as needed for Pain. 120 tablet 0    latanoprost 0.005 % ophthalmic solution Place 1 drop into both eyes every evening.      loratadine (CLARITIN) 10 mg tablet Take 10 mg by mouth once daily.      multivitamin Tab Take 1 tablet by mouth once daily. 30 tablet 0    naproxen sodium (ALEVE) 220 mg Cap Take by mouth.      ondansetron (ZOFRAN) 8 MG tablet Take 8mg (1 tab) 30-45 minutes prior to chemotherapy dose and every 8 hours as needed 30 tablet 3    thiamine 100 MG tablet Take 100 mg by mouth once daily.       No current facility-administered medications for this visit.      Past Surgical History:   Procedure Laterality Date    COLONOSCOPY  08/03/2009    Dr. Askew    CRANIOTOMY FOR EXCISION OF INTRACRANIAL TUMOR      CRANIOTOMY, WITH NEOPLASM EXCISION USING COMPUTER-ASSISTED NAVIGATION Right  6/13/2023    Procedure: CRANIOTOMY, WITH NEOPLASM EXCISION USING COMPUTER-ASSISTED NAVIGATION -     RIGHT FRONTAL;  Surgeon: Joao English MD;  Location: [facility];  Service: Neurosurgery;  Laterality: Right;    NASAL SEPTUM SURGERY      TONSILLECTOMY        Family History   Problem Relation Name Age of Onset    Heart attack Mother      Hypertension Mother      Cancer Father          prostate     Hypertension Brother      Stroke Brother          mild       Social History     Tobacco Use    Smoking status: Never     Passive exposure: Never    Smokeless tobacco: Never   Substance Use Topics    Alcohol use: Not Currently     Alcohol/week: 14.0 standard drinks of alcohol     Types: 7 Glasses of wine, 7 Cans of beer per week    Drug use: Never      Review of Systems  KPS: 80        Objective:      Vitals:    04/11/25 1133   BP: (!) 167/85   Temp: (!) 56 °F (13.3 °C)         NEUROLOGICAL EXAMINATION:     MENTAL STATUS   Attention: normal. Concentration: normal.   Speech: speech is normal   Level of consciousness: alert    CRANIAL NERVES   Cranial nerves II through XII intact.     MOTOR EXAM   Muscle bulk: normal  Right arm tone: increased    Strength   Strength 5/5 throughout.        Rigidity and bradykinesia R>L  Medium-frequency, low to medium-amplitude tremor of the right hand>left, more at rest than with posture or action     SENSORY EXAM   Light touch normal.     GAIT AND COORDINATION      Coordination   Finger to nose coordination: normal       Stooped posture with mild shuffling. En bloc, multistep turn.        MRI brain from 4/11/2025 was personally visualized and compared to prior.  Per my read, this is consistent with continued multifocal progression of disease.    Assessment:       Problem List Items Addressed This Visit          Neuro    Seizure disorder    Relevant Medications    divalproex (DEPAKOTE) 500 MG TbEC    Parkinson's disease without dyskinesia, with fluctuating manifestations       Oncology     Glioblastoma multiforme of brain - Primary    Relevant Orders    MRI Brain (Tumor with Perfusion) W W/O Contrast (XPD)    CBC Auto Differential    Comprehensive Metabolic Panel    Urinalysis     Other Visit Diagnoses         Hyperlipidemia, unspecified hyperlipidemia type        Relevant Orders    POCT Lipid Panel              Plan:       Chacorta Gómez is a 79 y.o. male with a glioblastoma, MGMT-methylated, IDH-wt, WHO grade 4 s/p subtotal resection and chemoradiation to 40Gy over 15fx and adjuvant TMZ x6 cycles completed 6/2024; POD 12/2024 and lomustine x2 cycles; POD 4/2025 presenting for follow up. Tumor diagnosis was heralded by seizure.     Glioblastoma  MRI of the brain shows continued progression of disease. Surgery and re- radiation are not feasible options due to multifocal nature.  Discussed third line therapy with bevacizumab 10mg/kg q2 weeks. Informed consent signed and uploaded to chart. CBC/CMP q2 weeks and UA monthly ordered for surveillance.  F/u in 2 months with surveillance MRI brain.    Tremor/parkinsonism  Pill rolling tremor, bradykinesia, and rigidity R>L along with stooped posture and shuffling consistent with parkinsonism. Symptoms mildly improved on Sinemet 25-100mg 1.5 tab tid.     Seizures  Continue Depakote 1000mg bid                  Lucita Macias MD  Department of Neurology  Neuro-Oncology, Movement Disorders

## 2025-04-28 ENCOUNTER — TELEPHONE (OUTPATIENT)
Dept: INFUSION THERAPY | Facility: HOSPITAL | Age: 80
End: 2025-04-28
Payer: MEDICARE

## 2025-04-30 ENCOUNTER — TELEPHONE (OUTPATIENT)
Dept: INFUSION THERAPY | Facility: HOSPITAL | Age: 80
End: 2025-04-30
Payer: MEDICARE

## 2025-04-30 NOTE — TELEPHONE ENCOUNTER
I have tried to call all the numbers linked to the patient and one goes straight to voice mail that is not set up, the other line and the wifes number will ring and then say voice mail not set up. the last time he logged into his portal was 4/16.

## 2025-05-05 ENCOUNTER — TELEPHONE (OUTPATIENT)
Facility: CLINIC | Age: 80
End: 2025-05-05
Payer: MEDICARE

## 2025-05-05 NOTE — TELEPHONE ENCOUNTER
Called patient and patient spouse to inform them that [facility] has been trying to contact them regarding scheduling patient's infusion. Unable to reach both the patient or his wife, left voicemail for patient.

## 2025-05-08 NOTE — TELEPHONE ENCOUNTER
Called and spoke with Mr. Gómez's wife, Uyen regarding some concerns she has regarding her 's behavior. Mrs. Qiu stated that Mr. Gómez has been confused mixing fact with fiction and would like to personally speak with Dr. Macias to discuss a plan of care. In person and virtual appointment offered, but declined. Mrs. Qiu would rather speak with Dr. Macias via telephone. Mrs. Qiu emphasized that she does not want Mr. Gómez to know that she's reaching out with these concerns. Will notify MD.

## 2025-05-12 ENCOUNTER — TELEPHONE (OUTPATIENT)
Dept: PALLIATIVE MEDICINE | Facility: CLINIC | Age: 80
End: 2025-05-12
Payer: MEDICARE

## 2025-05-15 ENCOUNTER — TELEPHONE (OUTPATIENT)
Dept: PALLIATIVE MEDICINE | Facility: CLINIC | Age: 80
End: 2025-05-15
Payer: MEDICARE

## 2025-05-16 ENCOUNTER — PATIENT MESSAGE (OUTPATIENT)
Facility: CLINIC | Age: 80
End: 2025-05-16
Payer: MEDICARE

## 2025-05-26 ENCOUNTER — TELEPHONE (OUTPATIENT)
Dept: ADMINISTRATIVE | Facility: OTHER | Age: 80
End: 2025-05-26
Payer: MEDICARE

## 2025-05-26 NOTE — TELEPHONE ENCOUNTER
Called and spoke with Mrs. Esperanza Gómez regarding her 's condition. Mrs. Mata states that her  sleeps a lot and is steadily declining. Mrs. Mata states that Mr. Whatley has had daily issues with irrational thoughts that does not make sense. She states his reality is different from actual reality and has to frequently reorient him.    Mrs. Mata would like to discuss the plan of care regarding her 's prognosis. Wife feels overwhelmed with his condition and is inquiring if placing Mr. Whatley in a facility is possible and resources available to assist with his care.    Mrs. Mata is expressing that it's hard managing her  while navigating this new territory. She would like literature on her 's cancer along with therapy to help her with this situation.    Mrs. Mata expresses how hard it is seeing her  deteriorate like this. 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       Confirmed appointments for MRI and Dr. Macias with Mrs. Mata.    Mrs. Mata expressed thanks at the conclusion of the call.

## 2025-05-28 NOTE — TELEPHONE ENCOUNTER
SW called patient's wife to discuss support needs. ERIN LVM with callback number and will follow up with [facility] message.

## 2025-06-03 ENCOUNTER — TELEPHONE (OUTPATIENT)
Dept: PALLIATIVE MEDICINE | Facility: CLINIC | Age: 80
End: 2025-06-03
Payer: MEDICARE

## 2025-06-05 ENCOUNTER — TELEPHONE (OUTPATIENT)
Dept: NEUROSURGERY | Facility: CLINIC | Age: 80
End: 2025-06-05
Payer: MEDICARE

## 2025-06-05 ENCOUNTER — HOSPITAL ENCOUNTER (OUTPATIENT)
Dept: RADIOLOGY | Facility: HOSPITAL | Age: 80
Discharge: HOME | End: 2025-06-05
Attending: PSYCHIATRY & NEUROLOGY
Payer: MEDICARE

## 2025-06-05 DIAGNOSIS — C71.9 GLIOBLASTOMA MULTIFORME OF BRAIN: ICD-10-CM

## 2025-06-06 ENCOUNTER — TELEPHONE (OUTPATIENT)
Dept: PALLIATIVE MEDICINE | Facility: CLINIC | Age: 80
End: 2025-06-06
Payer: MEDICARE

## 2025-06-16 ENCOUNTER — TELEPHONE (OUTPATIENT)
Dept: NEUROLOGY | Facility: CLINIC | Age: 80
End: 2025-06-16
Payer: MEDICARE

## 2025-06-16 NOTE — TELEPHONE ENCOUNTER
Patient wife called stating that patient is declining rapidly and wants to know next steps, informed patient wife that Dr. Macias would be able to go into further dept about patient's plan of care and next steps during appointment this Thursday.

## 2025-06-26 ENCOUNTER — TELEPHONE (OUTPATIENT)
Dept: NEUROLOGY | Facility: CLINIC | Age: 80
End: 2025-06-26
Payer: MEDICARE

## 2025-06-26 NOTE — TELEPHONE ENCOUNTER
Called and spoke with Esperanza Gómez regarding her concerns with Mr. Chacorta Gómez's condition. Mrs. Mata stated that Mr. Gómez has had an increase in falls with the last fall occurring within the past 24 hrs. She stated Mr. Gómez is unable to move and is favoring one side over the other. Mr. Gómez complains that his chronic back pain is worse than before and has a worsened gait. Mrs. Gómez expressed that Mr. Oakes has periods of delirium, but it's intermittent. His weakness however, has worsened in the past few days.     Advised Mrs. Gómez to seek care at the nearest Emergency Room. Stroke education given. Mrs. Gómez verbalized understanding.

## 2025-06-27 PROBLEM — I82.4Z2 ACUTE DEEP VEIN THROMBOSIS (DVT) OF DISTAL END OF LEFT LOWER EXTREMITY: Status: ACTIVE | Noted: 2025-06-27

## 2025-06-27 PROBLEM — Z71.89 ACP (ADVANCE CARE PLANNING): Status: ACTIVE | Noted: 2025-06-27

## 2025-06-27 PROBLEM — R53.81 PHYSICAL DECONDITIONING: Status: ACTIVE | Noted: 2025-06-27

## 2025-06-27 PROBLEM — G20.A1 PARKINSON'S DISEASE: Status: ACTIVE | Noted: 2025-06-27

## 2025-06-27 PROBLEM — R60.0 LOWER EXTREMITY EDEMA: Status: ACTIVE | Noted: 2025-06-27

## 2025-06-27 PROBLEM — D64.9 NORMOCYTIC ANEMIA: Status: ACTIVE | Noted: 2025-06-27

## 2025-06-28 PROBLEM — D64.9 NORMOCYTIC ANEMIA: Status: RESOLVED | Noted: 2025-06-27 | Resolved: 2025-06-28

## 2025-06-28 PROBLEM — R41.0 CONFUSION: Status: ACTIVE | Noted: 2025-06-28

## 2025-06-28 PROBLEM — R53.1 GENERALIZED WEAKNESS: Status: ACTIVE | Noted: 2025-06-28

## 2025-06-29 PROBLEM — Z73.6 LIMITATION OF ACTIVITY DUE TO DISABILITY: Status: ACTIVE | Noted: 2025-01-01

## 2025-07-03 PROBLEM — J69.0 ASPIRATION PNEUMONIA OF BOTH LUNGS: Status: ACTIVE | Noted: 2025-01-01

## 2025-07-07 PROBLEM — Z51.5 ENCOUNTER FOR PALLIATIVE CARE: Status: ACTIVE | Noted: 2025-01-01
